# Patient Record
Sex: FEMALE | Race: WHITE | Employment: OTHER | URBAN - METROPOLITAN AREA
[De-identification: names, ages, dates, MRNs, and addresses within clinical notes are randomized per-mention and may not be internally consistent; named-entity substitution may affect disease eponyms.]

---

## 2017-08-04 ENCOUNTER — HOSPITAL ENCOUNTER (OUTPATIENT)
Dept: PHYSICAL THERAPY | Age: 67
Discharge: HOME | End: 2017-08-04
Payer: MEDICARE

## 2017-08-04 PROCEDURE — G8984 CARRY CURRENT STATUS: HCPCS

## 2017-08-04 PROCEDURE — 97110 THERAPEUTIC EXERCISES: CPT

## 2017-08-04 PROCEDURE — 97162 PT EVAL MOD COMPLEX 30 MIN: CPT

## 2017-08-04 PROCEDURE — G8985 CARRY GOAL STATUS: HCPCS

## 2017-08-04 NOTE — PROGRESS NOTES
Ambulatory/Rehab Services H2 Model Falls Risk Assessment    Risk Factor Pts. ·   Confusion/Disorientation/Impulsivity  []    4 ·   Symptomatic Depression  []   2 ·   Altered Elimination  []   1 ·   Dizziness/Vertigo  []   1 ·   Gender (Male)  []   1 ·   Any administered antiepileptics (anticonvulsants):  []   2 ·   Any administered benzodiazepines:  []   1 ·   Visual Impairment (specify):  []   1 ·   Portable Oxygen Use  []   1 ·   Orthostatic ? BP  []   1 ·   History of Recent Falls (within 3 mos.)  []   5     Ability to Rise from Chair (choose one) Pts. ·   Ability to rise in a single movement  [x]   0 ·   Pushes up, successful in one attempt  []   1 ·   Multiple attempts, but successful  []   3 ·   Unable to rise without assistance  []   4   Total: (5 or greater = High Risk) 0     Falls Prevention Plan:   []                Physical Limitations to Exercise (specify):   []                Mobility Assistance Device (type):   []                Exercise/Equipment Adaptation (specify):    ©2010 [facility] [address] Patent #[id number].  Federal Law prohibits the replication, distribution or use without written permission from [facility] [employer]

## 2017-08-04 NOTE — PROGRESS NOTES
Severo Aris  : 1950 [address] [place] [address],[address]  [address] [place] [address]  [phone]   [phone]        OUTPATIENT PHYSICAL THERAPY:Initial Assessment 2017        ICD-10: Treatment Diagnosis: Impingement syndrome of left shoulder (M75.42); Pain in left shoulder (M25.512)  Precautions/Allergies:   Sulfa (sulfonamide antibiotics) and Ultram [tramadol]   Fall Risk Score: 0 (? 5 = High Risk)  MD Orders: evaluate and treat MEDICAL/REFERRING DIAGNOSIS:  Left shoulder pain [M25.512]   DATE OF ONSET: gradual onset over the past few months  REFERRING PHYSICIAN: Anuja Batista MD  RETURN PHYSICIAN APPOINTMENT: 17     INITIAL ASSESSMENT:  Ms. Imelda Fowler presents to therapy with left shoulder pain secondary to subacromial impingement syndrome. She has restricted mobility of the posterior capsule and tightness of the rotator cuff contributing to difficulty with reaching behind her back. She demonstrates weakness through the posterior rotator cuff due to atrophy and neuromuscular inhibition from pain causing difficulty with carrying tasks and reaching overhead. She has specific pain with movements into extension and functional internal rotation. Skilled therapy is required to return to prior level of function. PROBLEM LIST (Impacting functional limitations):  1. Decreased Strength  2. Decreased ADL/Functional Activities  3. Increased Pain  4. Decreased Activity Tolerance  5. Decreased Flexibility/Joint Mobility INTERVENTIONS PLANNED:  1. Cryotherapy  2. Electrical Stimulation  3. Family Education  4. Home Exercise Program (HEP)  5. Manual Therapy  6. Neuromuscular Re-education/Strengthening  7. Range of Motion (ROM)  8. Therapeutic Activites  9. Therapeutic Exercise/Strengthening  10. modalities   TREATMENT PLAN:  Effective Dates: 17 TO 17.   Frequency/Duration: 2 times a week for 6 weeks  GOALS: (Goals have been discussed and agreed upon with patient.)  Short-Term Functional Goals: Time Frame: 2 weeks  1. Patient will be independent with HEP. 2. Patient will report pain <3/10 with all daily activity. Discharge Goals: Time Frame: 6 weeks  1. Patient will have no pain with all daily activity. 2. Patient will improve shoulder ER to 5/5 to restore dynamic stability required for overhead reaching tasks. 3. Patient will improve functional IR to T7 to normalize posterior shoulder mobility for tasks such as washing her back or fastening a belt. Rehabilitation Potential For Stated Goals: Excellent  Regarding Verlean Hockey therapy, I certify that the treatment plan above will be carried out by a therapist or under their direction. Thank you for this referral,  Wesley Conti DPT       Referring Physician Signature: Erica Castañeda MD              Date                      HISTORY:   History of Present Injury/Illness (Reason for Referral):  Patient presents to therapy with primary complaint of left shoulder pain that she describes as a painful stiffness with occasional throbbing and dull pain. She notes no specific mechanism of injury but reports symptoms have gradually worsened over the past few months. She has increased pain with reaching behind her back or reaching into extension. She also has difficulty with falling asleep as she can't get comfortable when laying in bed. She has recently undergone cortisone injection with good effect but notes the shoulder continues to feel stiff. Past Medical History/Comorbidities:   Ms. Ye Kauffman  has a past medical history of Arthritis; Chronic pain; GERD (gastroesophageal reflux disease); Insomnia; and Liver disease. She also has no past medical history of Adverse effect of anesthesia; Aneurysm ([address] Arrhythmia; Asthma; Autoimmune disease ([address] CAD (coronary artery disease); Cancer ([address] Chronic kidney disease; Chronic obstructive pulmonary disease ([address] Coagulation disorder ([address] Diabetes ([address]  Difficult intubation; Heart failure ([facility] Hypertension; Malignant hyperthermia due to anesthesia; Nausea & vomiting; Other unknown and unspecified cause of morbidity or mortality; Pseudocholinesterase deficiency; Psychiatric disorder; PUD (peptic ulcer disease); Seizures ([facility] [facility] Stroke [facility]); [facility]embolus [facility] Thyroid disease; or Unspecified sleep apnea. Ms. Christian Lopez  has a past surgical history that includes heent; tonsillectomy (1961); lap cholecystectomy; carpal tunnel release (Left); carpal tunnel release (Right); orthopaedic (Left); orthopaedic (Left); hysterectomy; knee arthroscopy (Right); and bunionectomy (Right). Social History/Living Environment:     Patient lives at home with her family. Prior Level of Function/Work/Activity:  Patient is retired. Dominant Side:         RIGHT    Current Medications:    Current Outpatient Prescriptions:     amitriptyline (ELAVIL) 25 mg tablet, Take 25 mg by mouth nightly., Disp: , Rfl:     diclofenac EC (VOLTAREN) 75 mg EC tablet, Take 75 mg by mouth two (2) times a day., Disp: , Rfl:     omeprazole (PRILOSEC) 20 mg capsule, Take 20 mg by mouth Daily (before breakfast). Take / use AM day of surgery  per anesthesia protocols. Indications: GASTROESOPHAGEAL REFLUX, Disp: , Rfl:     HYDROcodone-acetaminophen (NORCO) 5-325 mg per tablet, Take 2 Tabs by mouth nightly. Indications: PAIN, Disp: , Rfl:     multivitamin (ONE A DAY) tablet, Take 1 Tab by mouth daily. , Disp: , Rfl:     VITAMIN E, DL,TOCOPHERYL ACET, (VITAMIN E ACETATE) 400 unit cap capsule, Take 400 Units by mouth every other day., Disp: , Rfl:     magnesium oxide (MAG-OX) 400 mg tablet, Take 400 mg by mouth daily. , Disp: , Rfl:     calcium 500 mg tab, Take 1 Tab by mouth every other day., Disp: , Rfl:     b complex vitamins (B COMPLEX 1) tablet, Take 1 Tab by mouth every other day., Disp: , Rfl:     ascorbic acid (VITAMIN C) 500 mg tablet, Take 500 mg by mouth daily. , Disp: , Rfl:    Date Last Reviewed: 8/4/2017   # of Personal Factors/Comorbidities that affect the Plan of Care: 1-2: MODERATE COMPLEXITY   EXAMINATION:   Observation/Orthostatic Postural Assessment:          Mild increase in thoracic kyphosis with abducted scapular position. No gross deformity or atrophy noted. Palpation:          Tenderness present at subacromial space and posterior glenohumeral joint. ROM:     Flexion: 140 ° active; 150 ° passive with pain on overpressure L; 170 ° R  ER: 70 ° L with pain at end range; 90 ° R  Hand behind head: full and symmetric  IR: 40 ° L with pain; 80 ° R  Functional IR: finger to L3 L; T7 R      Strength:     Scaption: 4/5 L with pain; 5/5 R  ER: 4/5 L with pain; 5/5 R  IR: 4+/5 L; 5/5 R  Elbow flexion: 5/5 B  Elbow extension: 5/5 B      Special Tests:          Neer (+); Montgomery (+); Drop arm (-); Lift off (-). Neurological Screen:        Grossly intact  Functional Mobility:         Independent  Balance: WNL   Body Structures Involved:  1. Nerves  2. Bones  3. Joints  4. Muscles  5. Ligaments Body Functions Affected:  1. Sensory/Pain  2. Neuromusculoskeletal  3. Movement Related Activities and Participation Affected:  1. General Tasks and Demands  2. Mobility  3. Self Care  4. Domestic Life  5. Interpersonal Interactions and Relationships   # of elements that affect the Plan of Care: 3: MODERATE COMPLEXITY   CLINICAL PRESENTATION:   Presentation: Evolving clinical presentation with changing clinical characteristics: MODERATE COMPLEXITY   CLINICAL DECISION MAKING:   Outcome Measure: Tool Used: SPADI  Score:  Initial: 20/130 Most Recent: X (Date: -- )   Interpretation of Score: 15% impaired with activities that involve use of the left arm  Outcome Measure Conversion Site    ?  Carrying, Moving, and Handling Objects:     - CURRENT STATUS: CI - 1%-19% impaired, limited or restricted    - GOAL STATUS: CH - 0% impaired, limited or restricted    - D/C STATUS:  ---------------To be determined---------------         Medical Necessity:   · Patient is expected to demonstrate progress in strength, range of motion, balance and coordination to decrease assistance required with activities that involve use of the left arm and to return to prior level of function. Reason for Services/Other Comments:  · Patient has demonstrated an improvement in functional level by independent performance of HEP. Use of outcome tool(s) and clinical judgement create a POC that gives a: Questionable prediction of patient's progress: MODERATE COMPLEXITY   TREATMENT:   (In addition to Assessment/Re-Assessment sessions the following treatments were rendered)  THERAPEUTIC EXERCISE: (see below for minutes):  Exercises per grid below to improve mobility, strength, balance and coordination. Required minimal verbal and manual cues to promote proper body alignment, promote proper body posture and promote proper body mechanics. Progressed resistance, range, repetitions and complexity of movement as indicated. MANUAL THERAPY: (see below for minutes): Joint mobilization and Soft tissue mobilization was utilized and necessary because of the patient's restricted joint motion, painful spasm, loss of articular motion and restricted motion of soft tissue. MODALITIES: (see below for minutes):      to decrease pain     Date: 08/04/17       Modalities:                                Therapeutic Exercise: 15 min       Towel IR stretch [id number]       Horizontal adduction stretch 5x5sec       Supine ER stretch [id number]       Seated W Red tband 30x       Band pull aparts Red tband 30x               Proprioceptive Activities:                                Manual Therapy: 5 min       Passive physiologic mobilizations Aggressive, focusing on IR of the GHJ               Therapeutic Activities:                                        HEP: see 08/04/17 flow sheet for specifics.   Treatment/Session Assessment:  Patient is independent with performance of above described home program.    · Pain/ Symptoms: Initial:   4/10 Post Session:  No increase following today's treatment session. ·   Compliance with Program/Exercises: Will assess as treatment progresses. · Recommendations/Intent for next treatment session: \"Next visit will focus on advancements to more challenging activities\".   Total Treatment Duration:  PT Patient Time In/Time Out  Time In: 0930  Time Out: [address]

## 2017-08-07 ENCOUNTER — HOSPITAL ENCOUNTER (OUTPATIENT)
Dept: PHYSICAL THERAPY | Age: 67
Discharge: HOME | End: 2017-08-07
Payer: MEDICARE

## 2017-08-07 PROCEDURE — 97140 MANUAL THERAPY 1/> REGIONS: CPT

## 2017-08-07 PROCEDURE — 97110 THERAPEUTIC EXERCISES: CPT

## 2017-08-07 NOTE — PROGRESS NOTES
Brandon Peralta  : 1950 [address] [address].  [phone]   [phone]        OUTPATIENT PHYSICAL THERAPY:Daily Note 2017        ICD-10: Treatment Diagnosis: Impingement syndrome of left shoulder (M75.42); Pain in left shoulder (M25.512)  Precautions/Allergies:   Sulfa (sulfonamide antibiotics) and Ultram [tramadol]   Fall Risk Score: 0 (? 5 = High Risk)  MD Orders: evaluate and treat MEDICAL/REFERRING DIAGNOSIS:  Left shoulder pain [M25.512]   DATE OF ONSET: gradual onset over the past few months  REFERRING PHYSICIAN: Letty Montero MD  RETURN PHYSICIAN APPOINTMENT: 17     INITIAL ASSESSMENT:  Ms. Michael Overton presents to therapy with left shoulder pain secondary to subacromial impingement syndrome. She has restricted mobility of the posterior capsule and tightness of the rotator cuff contributing to difficulty with reaching behind her back. She demonstrates weakness through the posterior rotator cuff due to atrophy and neuromuscular inhibition from pain causing difficulty with carrying tasks and reaching overhead. She has specific pain with movements into extension and functional internal rotation. Skilled therapy is required to return to prior level of function. PROBLEM LIST (Impacting functional limitations):  1. Decreased Strength  2. Decreased ADL/Functional Activities  3. Increased Pain  4. Decreased Activity Tolerance  5. Decreased Flexibility/Joint Mobility INTERVENTIONS PLANNED:  1. Cryotherapy  2. Electrical Stimulation  3. Family Education  4. Home Exercise Program (HEP)  5. Manual Therapy  6. Neuromuscular Re-education/Strengthening  7. Range of Motion (ROM)  8. Therapeutic Activites  9. Therapeutic Exercise/Strengthening  10. modalities   TREATMENT PLAN:  Effective Dates: 17 TO 17.   Frequency/Duration: 2 times a week for 6 weeks  GOALS: (Goals have been discussed and agreed upon with patient.)  Short-Term Functional Goals: Time Frame: 2 weeks  1. Patient will be independent with HEP. 2. Patient will report pain <3/10 with all daily activity. Discharge Goals: Time Frame: 6 weeks  1. Patient will have no pain with all daily activity. 2. Patient will improve shoulder ER to 5/5 to restore dynamic stability required for overhead reaching tasks. 3. Patient will improve functional IR to T7 to normalize posterior shoulder mobility for tasks such as washing her back or fastening a belt. Rehabilitation Potential For Stated Goals: Excellent                HISTORY:   History of Present Injury/Illness (Reason for Referral):  Patient presents to therapy with primary complaint of left shoulder pain that she describes as a painful stiffness with occasional throbbing and dull pain. She notes no specific mechanism of injury but reports symptoms have gradually worsened over the past few months. She has increased pain with reaching behind her back or reaching into extension. She also has difficulty with falling asleep as she can't get comfortable when laying in bed. She has recently undergone cortisone injection with good effect but notes the shoulder continues to feel stiff. Past Medical History/Comorbidities:   Ms. Emilee Gomez  has a past medical history of Arthritis; Chronic pain; GERD (gastroesophageal reflux disease); Insomnia; and Liver disease. She also has no past medical history of Adverse effect of anesthesia; Aneurysm ([address] Arrhythmia; Asthma; Autoimmune disease ([address] CAD (coronary artery disease); Cancer ([address] Chronic kidney disease; Chronic obstructive pulmonary disease ([address] Coagulation disorder ([address] Diabetes ([address] Difficult intubation; Heart failure ([address] Hypertension; Malignant hyperthermia due to anesthesia; Nausea & vomiting; Other unknown and unspecified cause of morbidity or mortality; Pseudocholinesterase deficiency; Psychiatric disorder; PUD (peptic ulcer disease);  Seizures ([address] Stroke [facility]); Thromboembolus ([facility] Thyroid disease; or Unspecified sleep apnea. Ms. Christian Lopez  has a past surgical history that includes heent; tonsillectomy (1961); lap cholecystectomy; carpal tunnel release (Left); carpal tunnel release (Right); orthopaedic (Left); orthopaedic (Left); hysterectomy; knee arthroscopy (Right); and bunionectomy (Right). Social History/Living Environment:     Patient lives at home with her family. Prior Level of Function/Work/Activity:  Patient is retired. Dominant Side:         RIGHT    Current Medications:    Current Outpatient Prescriptions:     amitriptyline (ELAVIL) 25 mg tablet, Take 25 mg by mouth nightly., Disp: , Rfl:     diclofenac EC (VOLTAREN) 75 mg EC tablet, Take 75 mg by mouth two (2) times a day., Disp: , Rfl:     omeprazole (PRILOSEC) 20 mg capsule, Take 20 mg by mouth Daily (before breakfast). Take / use AM day of surgery  per anesthesia protocols. Indications: GASTROESOPHAGEAL REFLUX, Disp: , Rfl:     HYDROcodone-acetaminophen (NORCO) 5-325 mg per tablet, Take 2 Tabs by mouth nightly. Indications: PAIN, Disp: , Rfl:     multivitamin (ONE A DAY) tablet, Take 1 Tab by mouth daily. , Disp: , Rfl:     VITAMIN E, DL,TOCOPHERYL ACET, (VITAMIN E ACETATE) 400 unit cap capsule, Take 400 Units by mouth every other day., Disp: , Rfl:     magnesium oxide (MAG-OX) 400 mg tablet, Take 400 mg by mouth daily. , Disp: , Rfl:     calcium 500 mg tab, Take 1 Tab by mouth every other day., Disp: , Rfl:     b complex vitamins (B COMPLEX 1) tablet, Take 1 Tab by mouth every other day., Disp: , Rfl:     ascorbic acid (VITAMIN C) 500 mg tablet, Take 500 mg by mouth daily. , Disp: , Rfl:    Date Last Reviewed:  8/7/2017   EXAMINATION:   Observation/Orthostatic Postural Assessment:          Mild increase in thoracic kyphosis with abducted scapular position. No gross deformity or atrophy noted.   Palpation:          Tenderness present at subacromial space and posterior glenohumeral joint.  ROM:     Flexion: 140 ° active; 150 ° passive with pain on overpressure L; 170 ° R  ER: 70 ° L with pain at end range; 90 ° R  Hand behind head: full and symmetric  IR: 40 ° L with pain; 80 ° R  Functional IR: finger to L3 L; T7 R      Strength:     Scaption: 4/5 L with pain; 5/5 R  ER: 4/5 L with pain; 5/5 R  IR: 4+/5 L; 5/5 R  Elbow flexion: 5/5 B  Elbow extension: 5/5 B      Special Tests:          Neer (+); Montgomery (+); Drop arm (-); Lift off (-). Neurological Screen:        Grossly intact  Functional Mobility:         Independent  Balance: WNL   Body Structures Involved:  1. Nerves  2. Bones  3. Joints  4. Muscles  5. Ligaments Body Functions Affected:  1. Sensory/Pain  2. Neuromusculoskeletal  3. Movement Related Activities and Participation Affected:  1. General Tasks and Demands  2. Mobility  3. Self Care  4. Domestic Life  5. Interpersonal Interactions and Relationships   CLINICAL PRESENTATION:   CLINICAL DECISION MAKING:   Outcome Measure: Tool Used: SPADI  Score:  Initial: 20/130 Most Recent: X (Date: -- )   Interpretation of Score: 15% impaired with activities that involve use of the left arm  Outcome Measure Conversion Site    ? Carrying, Moving, and Handling Objects:     - CURRENT STATUS: CI - 1%-19% impaired, limited or restricted    - GOAL STATUS: CH - 0% impaired, limited or restricted    - D/C STATUS:  ---------------To be determined---------------         Medical Necessity:   · Patient is expected to demonstrate progress in strength, range of motion, balance and coordination to decrease assistance required with activities that involve use of the left arm and to return to prior level of function. Reason for Services/Other Comments:  · Patient has demonstrated an improvement in functional level by independent performance of HEP.    TREATMENT:   (In addition to Assessment/Re-Assessment sessions the following treatments were rendered)  THERAPEUTIC EXERCISE: (see below for minutes):  Exercises per grid below to improve mobility, strength, balance and coordination. Required minimal verbal and manual cues to promote proper body alignment, promote proper body posture and promote proper body mechanics. Progressed resistance, range, repetitions and complexity of movement as indicated. MANUAL THERAPY: (see below for minutes): Joint mobilization and Soft tissue mobilization was utilized and necessary because of the patient's restricted joint motion, painful spasm, loss of articular motion and restricted motion of soft tissue. MODALITIES: (see below for minutes):      to decrease pain     Date: 08/04/17 08/07/17 (visit 2)      Modalities:  10 min        Ice x 10 min                       Therapeutic Exercise: 15 min 25 min      Towel IR stretch [id number] Cane extension [id number]      Horizontal adduction stretch 5x5sec       Supine ER stretch [id number]       Seated W Red tband 30x       Band pull aparts Red tband 30x       UBE  5 min L       D2  Against manual resistance 2x10      ER  2# 3x8      Abduction  Sidelying 2# 2x12      Row  Blue 3x10      IR   Red 2x15      Extension  Green B with UE ER 2x10                      Proprioceptive Activities:                                Manual Therapy: 5 min 15 min      Passive physiologic mobilizations Aggressive, focusing on IR of the GHJ Gr 3 to 3++ focusing on IR              Therapeutic Activities:                                        HEP: see 08/04/17 flow sheet for specifics. Treatment/Session Assessment:  Fatigues easily with progression of rotator cuff strengthening, however this is not irritating. Modified stretching for improved functional IR with cane extension stretch as this is not provocative. · Pain/ Symptoms: Initial:   4/10 \"It's been a bit sore the past few days. \" Post Session:  No increase following today's treatment session. ·   Compliance with Program/Exercises:  Will assess as treatment progresses. · Recommendations/Intent for next treatment session: \"Next visit will focus on advancements to more challenging activities\".   Total Treatment Duration:  PT Patient Time In/Time Out  Time In: 1015  Time Out: 1400 Mikhail Boateng DPT

## 2017-08-10 ENCOUNTER — HOSPITAL ENCOUNTER (OUTPATIENT)
Dept: PHYSICAL THERAPY | Age: 67
Discharge: HOME | End: 2017-08-10
Payer: MEDICARE

## 2017-08-10 PROCEDURE — 97110 THERAPEUTIC EXERCISES: CPT

## 2017-08-10 PROCEDURE — 97140 MANUAL THERAPY 1/> REGIONS: CPT

## 2017-08-10 NOTE — PROGRESS NOTES
Pierce Higginbotham  : 1950 [address] [place] [address],[address]  [address] [place] [address]  [phone]   [phone]        OUTPATIENT PHYSICAL THERAPY:Daily Note 8/10/2017        ICD-10: Treatment Diagnosis: Impingement syndrome of left shoulder (M75.42); Pain in left shoulder (M25.512)  Precautions/Allergies:   Sulfa (sulfonamide antibiotics) and Ultram [tramadol]   Fall Risk Score: 0 (? 5 = High Risk)  MD Orders: evaluate and treat MEDICAL/REFERRING DIAGNOSIS:  Left shoulder pain [M25.512]   DATE OF ONSET: gradual onset over the past few months  REFERRING PHYSICIAN: Zafar Gomez MD  RETURN PHYSICIAN APPOINTMENT: 17     INITIAL ASSESSMENT:  Ms. Jovana Gutierrez presents to therapy with left shoulder pain secondary to subacromial impingement syndrome. She has restricted mobility of the posterior capsule and tightness of the rotator cuff contributing to difficulty with reaching behind her back. She demonstrates weakness through the posterior rotator cuff due to atrophy and neuromuscular inhibition from pain causing difficulty with carrying tasks and reaching overhead. She has specific pain with movements into extension and functional internal rotation. Skilled therapy is required to return to prior level of function. PROBLEM LIST (Impacting functional limitations):  1. Decreased Strength  2. Decreased ADL/Functional Activities  3. Increased Pain  4. Decreased Activity Tolerance  5. Decreased Flexibility/Joint Mobility INTERVENTIONS PLANNED:  1. Cryotherapy  2. Electrical Stimulation  3. Family Education  4. Home Exercise Program (HEP)  5. Manual Therapy  6. Neuromuscular Re-education/Strengthening  7. Range of Motion (ROM)  8. Therapeutic Activites  9. Therapeutic Exercise/Strengthening  10. modalities   TREATMENT PLAN:  Effective Dates: 17 TO 17.   Frequency/Duration: 2 times a week for 6 weeks  GOALS: (Goals have been discussed and agreed upon with patient.)  Short-Term Functional Goals: Time Frame: 2 weeks  1. Patient will be independent with HEP. 2. Patient will report pain <3/10 with all daily activity. Discharge Goals: Time Frame: 6 weeks  1. Patient will have no pain with all daily activity. 2. Patient will improve shoulder ER to 5/5 to restore dynamic stability required for overhead reaching tasks. 3. Patient will improve functional IR to T7 to normalize posterior shoulder mobility for tasks such as washing her back or fastening a belt. Rehabilitation Potential For Stated Goals: Excellent                HISTORY:   History of Present Injury/Illness (Reason for Referral):  Patient presents to therapy with primary complaint of left shoulder pain that she describes as a painful stiffness with occasional throbbing and dull pain. She notes no specific mechanism of injury but reports symptoms have gradually worsened over the past few months. She has increased pain with reaching behind her back or reaching into extension. She also has difficulty with falling asleep as she can't get comfortable when laying in bed. She has recently undergone cortisone injection with good effect but notes the shoulder continues to feel stiff. Past Medical History/Comorbidities:   Ms. Amparo Herron  has a past medical history of Arthritis; Chronic pain; GERD (gastroesophageal reflux disease); Insomnia; and Liver disease. She also has no past medical history of Adverse effect of anesthesia; Aneurysm ([address] Arrhythmia; Asthma; Autoimmune disease ([address] CAD (coronary artery disease); Cancer ([address] Chronic kidney disease; Chronic obstructive pulmonary disease ([address] Coagulation disorder ([address] Diabetes ([address] Difficult intubation; Heart failure ([address] Hypertension; Malignant hyperthermia due to anesthesia; Nausea & vomiting; Other unknown and unspecified cause of morbidity or mortality; Pseudocholinesterase deficiency; Psychiatric disorder; PUD (peptic ulcer disease);  Seizures ([address] Stroke [facility]); Thromboembolus ([facility] [address] [facility] Thyroid disease; or Unspecified sleep apnea. Ms. Sheilla Nyhan  has a past surgical history that includes heent; tonsillectomy (1961); lap cholecystectomy; carpal tunnel release (Left); carpal tunnel release (Right); orthopaedic (Left); orthopaedic (Left); hysterectomy; knee arthroscopy (Right); and bunionectomy (Right). Social History/Living Environment:     Patient lives at home with her family. Prior Level of Function/Work/Activity:  Patient is retired. Dominant Side:         RIGHT    Current Medications:    Current Outpatient Prescriptions:     amitriptyline (ELAVIL) 25 mg tablet, Take 25 mg by mouth nightly., Disp: , Rfl:     diclofenac EC (VOLTAREN) 75 mg EC tablet, Take 75 mg by mouth two (2) times a day., Disp: , Rfl:     omeprazole (PRILOSEC) 20 mg capsule, Take 20 mg by mouth Daily (before breakfast). Take / use AM day of surgery  per anesthesia protocols. Indications: GASTROESOPHAGEAL REFLUX, Disp: , Rfl:     HYDROcodone-acetaminophen (NORCO) 5-325 mg per tablet, Take 2 Tabs by mouth nightly. Indications: PAIN, Disp: , Rfl:     multivitamin (ONE A DAY) tablet, Take 1 Tab by mouth daily. , Disp: , Rfl:     VITAMIN E, DL,TOCOPHERYL ACET, (VITAMIN E ACETATE) 400 unit cap capsule, Take 400 Units by mouth every other day., Disp: , Rfl:     magnesium oxide (MAG-OX) 400 mg tablet, Take 400 mg by mouth daily. , Disp: , Rfl:     calcium 500 mg tab, Take 1 Tab by mouth every other day., Disp: , Rfl:     b complex vitamins (B COMPLEX 1) tablet, Take 1 Tab by mouth every other day., Disp: , Rfl:     ascorbic acid (VITAMIN C) 500 mg tablet, Take 500 mg by mouth daily. , Disp: , Rfl:    Date Last Reviewed:  8/10/2017   EXAMINATION:   Observation/Orthostatic Postural Assessment:          Mild increase in thoracic kyphosis with abducted scapular position. No gross deformity or atrophy noted.   Palpation:          Tenderness present at subacromial space and posterior glenohumeral joint.  ROM:     Flexion: 140 ° active; 150 ° passive with pain on overpressure L; 170 ° R  ER: 70 ° L with pain at end range; 90 ° R  Hand behind head: full and symmetric  IR: 40 ° L with pain; 80 ° R  Functional IR: finger to L3 L; T7 R      Strength:     Scaption: 4/5 L with pain; 5/5 R  ER: 4/5 L with pain; 5/5 R  IR: 4+/5 L; 5/5 R  Elbow flexion: 5/5 B  Elbow extension: 5/5 B      Special Tests:          Neer (+); Montgomery (+); Drop arm (-); Lift off (-). Neurological Screen:        Grossly intact  Functional Mobility:         Independent  Balance: WNL   Body Structures Involved:  1. Nerves  2. Bones  3. Joints  4. Muscles  5. Ligaments Body Functions Affected:  1. Sensory/Pain  2. Neuromusculoskeletal  3. Movement Related Activities and Participation Affected:  1. General Tasks and Demands  2. Mobility  3. Self Care  4. Domestic Life  5. Interpersonal Interactions and Relationships   CLINICAL PRESENTATION:   CLINICAL DECISION MAKING:   Outcome Measure: Tool Used: SPADI  Score:  Initial: 20/130 Most Recent: X (Date: -- )   Interpretation of Score: 15% impaired with activities that involve use of the left arm  Outcome Measure Conversion Site    ? Carrying, Moving, and Handling Objects:     - CURRENT STATUS: CI - 1%-19% impaired, limited or restricted    - GOAL STATUS: CH - 0% impaired, limited or restricted    - D/C STATUS:  ---------------To be determined---------------         Medical Necessity:   · Patient is expected to demonstrate progress in strength, range of motion, balance and coordination to decrease assistance required with activities that involve use of the left arm and to return to prior level of function. Reason for Services/Other Comments:  · Patient has demonstrated an improvement in functional level by independent performance of HEP.    TREATMENT:   (In addition to Assessment/Re-Assessment sessions the following treatments were rendered)  THERAPEUTIC EXERCISE: (see below for minutes):  Exercises per grid below to improve mobility, strength, balance and coordination. Required minimal verbal and manual cues to promote proper body alignment, promote proper body posture and promote proper body mechanics. Progressed resistance, range, repetitions and complexity of movement as indicated. MANUAL THERAPY: (see below for minutes): Joint mobilization and Soft tissue mobilization was utilized and necessary because of the patient's restricted joint motion, painful spasm, loss of articular motion and restricted motion of soft tissue. MODALITIES: (see below for minutes):      to decrease pain     Date: 08/04/17 08/07/17 (visit 2) 08/10/17 (visit 3)     Modalities:  10 min 10 min       Ice x 10 min  Ice x 10 min                     Therapeutic Exercise: 15 min 25 min 25 min     Towel IR stretch [id number] Cane extension [id number]      Horizontal adduction stretch 5x5sec       Supine ER stretch [id number]       Seated W Red tband 30x       Band pull aparts Red tband 30x       UBE  5 min L  5 min L5     D2  Against manual resistance 2x10 Against manual resistance 30x     ER  2# 3x8      Abduction  Sidelying 2# 2x12 Sidelying 2# 2x15     Row  Blue 3x10 Black 2x15     IR   Red 2x15 Red 2x15     Extension  Green B with UE ER 2x10 Green B with UE ER 2x15; red 15x      Scaption   2# 2x10             Proprioceptive Activities:                                Manual Therapy: 5 min 15 min 15 min     Passive physiologic mobilizations Aggressive, focusing on IR of the GHJ Gr 3 to 3++ focusing on IR repeat             Therapeutic Activities:                                        HEP: see 08/04/17 flow sheet for specifics. Treatment/Session Assessment:  Progress with loading of active elevation. This fatigues quickly but is not provocative. · Pain/ Symptoms: Initial:   4/10 \"It's a bit sore. I'm going behind my back a little better, but the shoulder is a bit more sore today. \" Post Session:  No increase following today's treatment session. ·   Compliance with Program/Exercises: Will assess as treatment progresses. · Recommendations/Intent for next treatment session: \"Next visit will focus on advancements to more challenging activities\".   Total Treatment Duration:  PT Patient Time In/Time Out  Time In: 1400  Time Out: [address]ibha López DPT

## 2017-08-14 ENCOUNTER — HOSPITAL ENCOUNTER (OUTPATIENT)
Dept: PHYSICAL THERAPY | Age: 67
Discharge: HOME | End: 2017-08-14
Payer: MEDICARE

## 2017-08-14 PROCEDURE — 97110 THERAPEUTIC EXERCISES: CPT

## 2017-08-14 PROCEDURE — 97140 MANUAL THERAPY 1/> REGIONS: CPT

## 2017-08-14 NOTE — PROGRESS NOTES
Severo Aris  : 1950 [address] [place].  [phone]   [phone]        OUTPATIENT PHYSICAL THERAPY:Daily Note 2017        ICD-10: Treatment Diagnosis: Impingement syndrome of left shoulder (M75.42); Pain in left shoulder (M25.512)  Precautions/Allergies:   Sulfa (sulfonamide antibiotics) and Ultram [tramadol]   Fall Risk Score: 0 (? 5 = High Risk)  MD Orders: evaluate and treat MEDICAL/REFERRING DIAGNOSIS:  Left shoulder pain [M25.512]   DATE OF ONSET: gradual onset over the past few months  REFERRING PHYSICIAN: Anuja Batista MD  RETURN PHYSICIAN APPOINTMENT: 17     INITIAL ASSESSMENT:  Ms. Imelda Fowler presents to therapy with left shoulder pain secondary to subacromial impingement syndrome. She has restricted mobility of the posterior capsule and tightness of the rotator cuff contributing to difficulty with reaching behind her back. She demonstrates weakness through the posterior rotator cuff due to atrophy and neuromuscular inhibition from pain causing difficulty with carrying tasks and reaching overhead. She has specific pain with movements into extension and functional internal rotation. Skilled therapy is required to return to prior level of function. PROBLEM LIST (Impacting functional limitations):  1. Decreased Strength  2. Decreased ADL/Functional Activities  3. Increased Pain  4. Decreased Activity Tolerance  5. Decreased Flexibility/Joint Mobility INTERVENTIONS PLANNED:  1. Cryotherapy  2. Electrical Stimulation  3. Family Education  4. Home Exercise Program (HEP)  5. Manual Therapy  6. Neuromuscular Re-education/Strengthening  7. Range of Motion (ROM)  8. Therapeutic Activites  9. Therapeutic Exercise/Strengthening  10. modalities   TREATMENT PLAN:  Effective Dates: 17 TO 17.   Frequency/Duration: 2 times a week for 6 weeks  GOALS: (Goals have been discussed and agreed upon with patient.)  Short-Term Functional Goals: Time Frame: 2 weeks  1. Patient will be independent with HEP. 2. Patient will report pain <3/10 with all daily activity. Discharge Goals: Time Frame: 6 weeks  1. Patient will have no pain with all daily activity. 2. Patient will improve shoulder ER to 5/5 to restore dynamic stability required for overhead reaching tasks. 3. Patient will improve functional IR to T7 to normalize posterior shoulder mobility for tasks such as washing her back or fastening a belt. Rehabilitation Potential For Stated Goals: Excellent                HISTORY:   History of Present Injury/Illness (Reason for Referral):  Patient presents to therapy with primary complaint of left shoulder pain that she describes as a painful stiffness with occasional throbbing and dull pain. She notes no specific mechanism of injury but reports symptoms have gradually worsened over the past few months. She has increased pain with reaching behind her back or reaching into extension. She also has difficulty with falling asleep as she can't get comfortable when laying in bed. She has recently undergone cortisone injection with good effect but notes the shoulder continues to feel stiff. Past Medical History/Comorbidities:   Ms. Jabari Slade  has a past medical history of Arthritis; Chronic pain; GERD (gastroesophageal reflux disease); Insomnia; and Liver disease. She also has no past medical history of Adverse effect of anesthesia; Aneurysm ([address] Arrhythmia; Asthma; Autoimmune disease ([address] CAD (coronary artery disease); Cancer ([address] Chronic kidney disease; Chronic obstructive pulmonary disease ([address] Coagulation disorder ([address] Diabetes ([address] Difficult intubation; Heart failure ([address] Hypertension; Malignant hyperthermia due to anesthesia; Nausea & vomiting; Other unknown and unspecified cause of morbidity or mortality; Pseudocholinesterase deficiency; Psychiatric disorder; PUD (peptic ulcer disease);  Seizures ([address] Stroke [facility]); Thromboembolus ([facility] [address] Thyroid disease; or Unspecified sleep apnea. Ms. Emilee Gomez  has a past surgical history that includes heent; tonsillectomy (1961); lap cholecystectomy; carpal tunnel release (Left); carpal tunnel release (Right); orthopaedic (Left); orthopaedic (Left); hysterectomy; knee arthroscopy (Right); and bunionectomy (Right). Social History/Living Environment:     Patient lives at home with her family. Prior Level of Function/Work/Activity:  Patient is retired. Dominant Side:         RIGHT    Current Medications:    Current Outpatient Prescriptions:     amitriptyline (ELAVIL) 25 mg tablet, Take 25 mg by mouth nightly., Disp: , Rfl:     diclofenac EC (VOLTAREN) 75 mg EC tablet, Take 75 mg by mouth two (2) times a day., Disp: , Rfl:     omeprazole (PRILOSEC) 20 mg capsule, Take 20 mg by mouth Daily (before breakfast). Take / use AM day of surgery  per anesthesia protocols. Indications: GASTROESOPHAGEAL REFLUX, Disp: , Rfl:     HYDROcodone-acetaminophen (NORCO) 5-325 mg per tablet, Take 2 Tabs by mouth nightly. Indications: PAIN, Disp: , Rfl:     multivitamin (ONE A DAY) tablet, Take 1 Tab by mouth daily. , Disp: , Rfl:     VITAMIN E, DL,TOCOPHERYL ACET, (VITAMIN E ACETATE) 400 unit cap capsule, Take 400 Units by mouth every other day., Disp: , Rfl:     magnesium oxide (MAG-OX) 400 mg tablet, Take 400 mg by mouth daily. , Disp: , Rfl:     calcium 500 mg tab, Take 1 Tab by mouth every other day., Disp: , Rfl:     b complex vitamins (B COMPLEX 1) tablet, Take 1 Tab by mouth every other day., Disp: , Rfl:     ascorbic acid (VITAMIN C) 500 mg tablet, Take 500 mg by mouth daily. , Disp: , Rfl:    Date Last Reviewed:  8/14/2017   EXAMINATION:   Observation/Orthostatic Postural Assessment:          Mild increase in thoracic kyphosis with abducted scapular position. No gross deformity or atrophy noted.   Palpation:          Tenderness present at subacromial space and posterior glenohumeral joint.  ROM:     Flexion: 140 ° active; 150 ° passive with pain on overpressure L; 170 ° R  ER: 70 ° L with pain at end range; 90 ° R  Hand behind head: full and symmetric  IR: 40 ° L with pain; 80 ° R  Functional IR: finger to L3 L; T7 R      Strength:     Scaption: 4/5 L with pain; 5/5 R  ER: 4/5 L with pain; 5/5 R  IR: 4+/5 L; 5/5 R  Elbow flexion: 5/5 B  Elbow extension: 5/5 B      Special Tests:          Neer (+); Montgomery (+); Drop arm (-); Lift off (-). Neurological Screen:        Grossly intact  Functional Mobility:         Independent  Balance: WNL   Body Structures Involved:  1. Nerves  2. Bones  3. Joints  4. Muscles  5. Ligaments Body Functions Affected:  1. Sensory/Pain  2. Neuromusculoskeletal  3. Movement Related Activities and Participation Affected:  1. General Tasks and Demands  2. Mobility  3. Self Care  4. Domestic Life  5. Interpersonal Interactions and Relationships   CLINICAL PRESENTATION:   CLINICAL DECISION MAKING:   Outcome Measure: Tool Used: SPADI  Score:  Initial: 20/130 Most Recent: X (Date: -- )   Interpretation of Score: 15% impaired with activities that involve use of the left arm  Outcome Measure Conversion Site    ? Carrying, Moving, and Handling Objects:     - CURRENT STATUS: CI - 1%-19% impaired, limited or restricted    - GOAL STATUS: CH - 0% impaired, limited or restricted    - D/C STATUS:  ---------------To be determined---------------         Medical Necessity:   · Patient is expected to demonstrate progress in strength, range of motion, balance and coordination to decrease assistance required with activities that involve use of the left arm and to return to prior level of function. Reason for Services/Other Comments:  · Patient has demonstrated an improvement in functional level by independent performance of HEP.    TREATMENT:   (In addition to Assessment/Re-Assessment sessions the following treatments were rendered)  THERAPEUTIC EXERCISE: (see below for minutes):  Exercises per grid below to improve mobility, strength, balance and coordination. Required minimal verbal and manual cues to promote proper body alignment, promote proper body posture and promote proper body mechanics. Progressed resistance, range, repetitions and complexity of movement as indicated. MANUAL THERAPY: (see below for minutes): Joint mobilization and Soft tissue mobilization was utilized and necessary because of the patient's restricted joint motion, painful spasm, loss of articular motion and restricted motion of soft tissue. MODALITIES: (see below for minutes):      to decrease pain     Date: 08/04/17 08/07/17 (visit 2) 08/10/17 (visit 3) 08/14/17 (visit 4)    Modalities:  10 min 10 min 10 min      Ice x 10 min  Ice x 10 min Ice x 10 min                    Therapeutic Exercise: 15 min 25 min 25 min 25 min    Towel IR stretch [id number] Cane extension [id number]      Horizontal adduction stretch 5x5sec       Supine ER stretch [id number]       Seated W Red tband 30x       Band pull aparts Red tband 30x       UBE  5 min L  5 min L5     D2  Against manual resistance 2x10 Against manual resistance 30x Against manual resistance 30x    ER  2# 3x8  2# 2x12    Abduction  Sidelying 2# 2x12 Sidelying 2# 2x15 Sidelying 2# 2x12    Row  Blue 3x10 Black 2x15 15# single arm 2x15    IR   Red 2x15 Red 2x15     Extension  Green B with UE ER 2x10 Green B with UE ER 2x15; red 15x  Red 2x15    Scaption   2# 2x10 2# 2x12; supine punch 3# 2x15; wall wash x10, 2# x15 with [employer] lift off            Proprioceptive Activities:                                Manual Therapy: 5 min 15 min 15 min 15 min    Passive physiologic mobilizations Aggressive, focusing on IR of the GHJ Gr 3 to 3++ focusing on IR repeat repeat            Therapeutic Activities:                                        HEP: see 08/04/17 flow sheet for specifics.   Treatment/Session Assessment:  Able to continue loading rotator cuff without initial increase in symptom severity. · Pain/ Symptoms: Initial:   4/10 \"It's been hurting a bit more. It's hard to sleep at night. \" Pt does note minimal pain at rest or during the day, no difficulty with raising the arm overhead. Primary symptoms provoked with reaching behind back. Post Session:  No increase following today's treatment session. ·   Compliance with Program/Exercises: Will assess as treatment progresses. · Recommendations/Intent for next treatment session: \"Next visit will focus on advancements to more challenging activities\".   Total Treatment Duration:  PT Patient Time In/Time Out  Time In: 1145  Time Out: Martir Tillman DPT

## 2017-08-16 ENCOUNTER — HOSPITAL ENCOUNTER (OUTPATIENT)
Dept: PHYSICAL THERAPY | Age: 67
Discharge: HOME | End: 2017-08-16
Payer: MEDICARE

## 2017-08-16 PROCEDURE — 97110 THERAPEUTIC EXERCISES: CPT

## 2017-08-16 PROCEDURE — 97140 MANUAL THERAPY 1/> REGIONS: CPT

## 2017-08-16 NOTE — PROGRESS NOTES
Puma Paget  : 1950 [address] [place] [address],[address]  [address] [place] [address].  [phone]   [phone]        OUTPATIENT PHYSICAL THERAPY:Daily Note 2017        ICD-10: Treatment Diagnosis: Impingement syndrome of left shoulder (M75.42); Pain in left shoulder (M25.512)  Precautions/Allergies:   Sulfa (sulfonamide antibiotics) and Ultram [tramadol]   Fall Risk Score: 0 (? 5 = High Risk)  MD Orders: evaluate and treat MEDICAL/REFERRING DIAGNOSIS:  Left shoulder pain [M25.512]   DATE OF ONSET: gradual onset over the past few months  REFERRING PHYSICIAN: Regino Lewis MD  RETURN PHYSICIAN APPOINTMENT: 17     INITIAL ASSESSMENT:  Ms. Sheilla Nyhan presents to therapy with left shoulder pain secondary to subacromial impingement syndrome. She has restricted mobility of the posterior capsule and tightness of the rotator cuff contributing to difficulty with reaching behind her back. She demonstrates weakness through the posterior rotator cuff due to atrophy and neuromuscular inhibition from pain causing difficulty with carrying tasks and reaching overhead. She has specific pain with movements into extension and functional internal rotation. Skilled therapy is required to return to prior level of function. PROBLEM LIST (Impacting functional limitations):  1. Decreased Strength  2. Decreased ADL/Functional Activities  3. Increased Pain  4. Decreased Activity Tolerance  5. Decreased Flexibility/Joint Mobility INTERVENTIONS PLANNED:  1. Cryotherapy  2. Electrical Stimulation  3. Family Education  4. Home Exercise Program (HEP)  5. Manual Therapy  6. Neuromuscular Re-education/Strengthening  7. Range of Motion (ROM)  8. Therapeutic Activites  9. Therapeutic Exercise/Strengthening  10. modalities   TREATMENT PLAN:  Effective Dates: 17 TO 17.   Frequency/Duration: 2 times a week for 6 weeks  GOALS: (Goals have been discussed and agreed upon with patient.)  Short-Term Functional Goals: Time Frame: 2 weeks  1. Patient will be independent with HEP. 2. Patient will report pain <3/10 with all daily activity. Discharge Goals: Time Frame: 6 weeks  1. Patient will have no pain with all daily activity. 2. Patient will improve shoulder ER to 5/5 to restore dynamic stability required for overhead reaching tasks. 3. Patient will improve functional IR to T7 to normalize posterior shoulder mobility for tasks such as washing her back or fastening a belt. Rehabilitation Potential For Stated Goals: Excellent                HISTORY:   History of Present Injury/Illness (Reason for Referral):  Patient presents to therapy with primary complaint of left shoulder pain that she describes as a painful stiffness with occasional throbbing and dull pain. She notes no specific mechanism of injury but reports symptoms have gradually worsened over the past few months. She has increased pain with reaching behind her back or reaching into extension. She also has difficulty with falling asleep as she can't get comfortable when laying in bed. She has recently undergone cortisone injection with good effect but notes the shoulder continues to feel stiff. Past Medical History/Comorbidities:   Ms. Carol Ann Harris  has a past medical history of Arthritis; Chronic pain; GERD (gastroesophageal reflux disease); Insomnia; and Liver disease. She also has no past medical history of Adverse effect of anesthesia; Aneurysm ([address] Arrhythmia; Asthma; Autoimmune disease ([address] CAD (coronary artery disease); Cancer ([address] Chronic kidney disease; Chronic obstructive pulmonary disease ([address] Coagulation disorder ([address] Diabetes ([address] Difficult intubation; Heart failure ([address] Hypertension; Malignant hyperthermia due to anesthesia; Nausea & vomiting; Other unknown and unspecified cause of morbidity or mortality; Pseudocholinesterase deficiency; Psychiatric disorder; PUD (peptic ulcer disease);  Seizures ([address] Stroke [facility]); Thromboembolus ([facility] [address] Thyroid disease; or Unspecified sleep apnea. Ms. Dulce Maria Damon  has a past surgical history that includes heent; tonsillectomy (1961); lap cholecystectomy; carpal tunnel release (Left); carpal tunnel release (Right); orthopaedic (Left); orthopaedic (Left); hysterectomy; knee arthroscopy (Right); and bunionectomy (Right). Social History/Living Environment:     Patient lives at home with her family. Prior Level of Function/Work/Activity:  Patient is retired. Dominant Side:         RIGHT    Current Medications:    Current Outpatient Prescriptions:     amitriptyline (ELAVIL) 25 mg tablet, Take 25 mg by mouth nightly., Disp: , Rfl:     diclofenac EC (VOLTAREN) 75 mg EC tablet, Take 75 mg by mouth two (2) times a day., Disp: , Rfl:     omeprazole (PRILOSEC) 20 mg capsule, Take 20 mg by mouth Daily (before breakfast). Take / use AM day of surgery  per anesthesia protocols. Indications: GASTROESOPHAGEAL REFLUX, Disp: , Rfl:     HYDROcodone-acetaminophen (NORCO) 5-325 mg per tablet, Take 2 Tabs by mouth nightly. Indications: PAIN, Disp: , Rfl:     multivitamin (ONE A DAY) tablet, Take 1 Tab by mouth daily. , Disp: , Rfl:     VITAMIN E, DL,TOCOPHERYL ACET, (VITAMIN E ACETATE) 400 unit cap capsule, Take 400 Units by mouth every other day., Disp: , Rfl:     magnesium oxide (MAG-OX) 400 mg tablet, Take 400 mg by mouth daily. , Disp: , Rfl:     calcium 500 mg tab, Take 1 Tab by mouth every other day., Disp: , Rfl:     b complex vitamins (B COMPLEX 1) tablet, Take 1 Tab by mouth every other day., Disp: , Rfl:     ascorbic acid (VITAMIN C) 500 mg tablet, Take 500 mg by mouth daily. , Disp: , Rfl:    Date Last Reviewed:  8/16/2017   EXAMINATION:   Observation/Orthostatic Postural Assessment:          Mild increase in thoracic kyphosis with abducted scapular position. No gross deformity or atrophy noted.   Palpation:          Tenderness present at subacromial space and posterior glenohumeral joint.  ROM:     Flexion: 140 ° active; 150 ° passive with pain on overpressure L; 170 ° R  ER: 70 ° L with pain at end range; 90 ° R  Hand behind head: full and symmetric  IR: 40 ° L with pain; 80 ° R  Functional IR: finger to L3 L; T7 R      Strength:     Scaption: 4/5 L with pain; 5/5 R  ER: 4/5 L with pain; 5/5 R  IR: 4+/5 L; 5/5 R  Elbow flexion: 5/5 B  Elbow extension: 5/5 B      Special Tests:          Neer (+); Montgomery (+); Drop arm (-); Lift off (-). Neurological Screen:        Grossly intact  Functional Mobility:         Independent  Balance: WNL   Body Structures Involved:  1. Nerves  2. Bones  3. Joints  4. Muscles  5. Ligaments Body Functions Affected:  1. Sensory/Pain  2. Neuromusculoskeletal  3. Movement Related Activities and Participation Affected:  1. General Tasks and Demands  2. Mobility  3. Self Care  4. Domestic Life  5. Interpersonal Interactions and Relationships   CLINICAL PRESENTATION:   CLINICAL DECISION MAKING:   Outcome Measure: Tool Used: SPADI  Score:  Initial: 20/130 Most Recent: X (Date: -- )   Interpretation of Score: 15% impaired with activities that involve use of the left arm  Outcome Measure Conversion Site    ? Carrying, Moving, and Handling Objects:     - CURRENT STATUS: CI - 1%-19% impaired, limited or restricted    - GOAL STATUS: CH - 0% impaired, limited or restricted    - D/C STATUS:  ---------------To be determined---------------         Medical Necessity:   · Patient is expected to demonstrate progress in strength, range of motion, balance and coordination to decrease assistance required with activities that involve use of the left arm and to return to prior level of function. Reason for Services/Other Comments:  · Patient has demonstrated an improvement in functional level by independent performance of HEP.    TREATMENT:   (In addition to Assessment/Re-Assessment sessions the following treatments were rendered)  THERAPEUTIC EXERCISE: (see below for minutes):  Exercises per grid below to improve mobility, strength, balance and coordination. Required minimal verbal and manual cues to promote proper body alignment, promote proper body posture and promote proper body mechanics. Progressed resistance, range, repetitions and complexity of movement as indicated. MANUAL THERAPY: (see below for minutes): Joint mobilization and Soft tissue mobilization was utilized and necessary because of the patient's restricted joint motion, painful spasm, loss of articular motion and restricted motion of soft tissue.    MODALITIES: (see below for minutes):      to decrease pain     Date: 08/04/17 08/07/17 (visit 2) 08/10/17 (visit 3) 08/14/17 (visit 4) 8-16-17  (Visit 5)    Modalities:  10 min 10 min 10 min 10 mins      Ice x 10 min  Ice x 10 min Ice x 10 min Repeat                    Therapeutic Exercise: 15 min 25 min 25 min 25 min 25 mins    Towel IR stretch [id number] Cane extension [id number]      Horizontal adduction stretch 5x5sec       Supine ER stretch [id number]       Seated W Red tband 30x       Band pull aparts Red tband 30x       UBE  5 min L  5 min L5  5 min L5    D2  Against manual resistance 2x10 Against manual resistance 30x Against manual resistance 30x Repeat    ER  2# 3x8  2# 2x12 Repeat SL    Abduction  Sidelying 2# 2x12 Sidelying 2# 2x15 Sidelying 2# 2x12 Repeat    Row  Blue 3x10 Black 2x15 15# single arm 2x15 17.5# repeat    IR   Red 2x15 Red 2x15     Extension  Green B with UE ER 2x10 Green B with UE ER 2x15; red 15x  Red 2x15 Repeat    Scaption   2# 2x10 2# 2x12; supine punch 3# 2x15; wall wash x10, 2# x15 with OKC lift off Repeat ll a   Pulleys      Flexion x30   Proprioceptive Activities:                                Manual Therapy: 5 min 15 min 15 min 15 min 15 mins    Passive physiologic mobilizations Aggressive, focusing on IR of the GHJ Gr 3 to 3++ focusing on IR repeat repeat Repeat            Therapeutic Activities: HEP: see 08/04/17 flow sheet for specifics. Treatment/Session Assessment: Pt had posterior pain with the SL ER using the weight and without the weight. Pt stated the pain subsided before the end of treatment. Pt tolerates full PROM following stretching. Continue with POC. · Pain/ Symptoms: Initial:   4/10 \"i had trouble getting to sleep last night. \"  Post Session:  No increase following today's treatment session. ·   Compliance with Program/Exercises: Will assess as treatment progresses. · Recommendations/Intent for next treatment session: \"Next visit will focus on advancements to more challenging activities\".   Total Treatment Duration:  PT Patient Time In/Time Out  Time In: 0930  Time Out: [address], PT, DPT

## 2017-08-21 ENCOUNTER — HOSPITAL ENCOUNTER (OUTPATIENT)
Dept: PHYSICAL THERAPY | Age: 67
Discharge: HOME | End: 2017-08-21
Payer: MEDICARE

## 2017-08-21 PROCEDURE — 97110 THERAPEUTIC EXERCISES: CPT

## 2017-08-21 PROCEDURE — 97140 MANUAL THERAPY 1/> REGIONS: CPT

## 2017-08-21 NOTE — PROGRESS NOTES
Josiane Harding  : 1950 [id number] [facility],[address]  [address] [place] [address].  [phone]   [phone]        OUTPATIENT PHYSICAL THERAPY:Daily Note 2017        ICD-10: Treatment Diagnosis: Impingement syndrome of left shoulder (M75.42); Pain in left shoulder (M25.512)  Precautions/Allergies:   Sulfa (sulfonamide antibiotics) and Ultram [tramadol]   Fall Risk Score: 0 (? 5 = High Risk)  MD Orders: evaluate and treat MEDICAL/REFERRING DIAGNOSIS:  Left shoulder pain [M25.512]   DATE OF ONSET: gradual onset over the past few months  REFERRING PHYSICIAN: Maxwell Levy MD  RETURN PHYSICIAN APPOINTMENT: 17     INITIAL ASSESSMENT:  Ms. Mikayla Bhatia presents to therapy with left shoulder pain secondary to subacromial impingement syndrome. She has restricted mobility of the posterior capsule and tightness of the rotator cuff contributing to difficulty with reaching behind her back. She demonstrates weakness through the posterior rotator cuff due to atrophy and neuromuscular inhibition from pain causing difficulty with carrying tasks and reaching overhead. She has specific pain with movements into extension and functional internal rotation. Skilled therapy is required to return to prior level of function. PROBLEM LIST (Impacting functional limitations):  1. Decreased Strength  2. Decreased ADL/Functional Activities  3. Increased Pain  4. Decreased Activity Tolerance  5. Decreased Flexibility/Joint Mobility INTERVENTIONS PLANNED:  1. Cryotherapy  2. Electrical Stimulation  3. Family Education  4. Home Exercise Program (HEP)  5. Manual Therapy  6. Neuromuscular Re-education/Strengthening  7. Range of Motion (ROM)  8. Therapeutic Activites  9. Therapeutic Exercise/Strengthening  10. modalities   TREATMENT PLAN:  Effective Dates: 17 TO 17.   Frequency/Duration: 2 times a week for 6 weeks  GOALS: (Goals have been discussed and agreed upon with patient.)  Short-Term Functional Goals: Time Frame: 2 weeks  1. Patient will be independent with HEP. 2. Patient will report pain <3/10 with all daily activity. Discharge Goals: Time Frame: 6 weeks  1. Patient will have no pain with all daily activity. 2. Patient will improve shoulder ER to 5/5 to restore dynamic stability required for overhead reaching tasks. 3. Patient will improve functional IR to T7 to normalize posterior shoulder mobility for tasks such as washing her back or fastening a belt. Rehabilitation Potential For Stated Goals: Excellent                HISTORY:   History of Present Injury/Illness (Reason for Referral):  Patient presents to therapy with primary complaint of left shoulder pain that she describes as a painful stiffness with occasional throbbing and dull pain. She notes no specific mechanism of injury but reports symptoms have gradually worsened over the past few months. She has increased pain with reaching behind her back or reaching into extension. She also has difficulty with falling asleep as she can't get comfortable when laying in bed. She has recently undergone cortisone injection with good effect but notes the shoulder continues to feel stiff. Past Medical History/Comorbidities:   Ms. Chelsie Quinn  has a past medical history of Arthritis; Chronic pain; GERD (gastroesophageal reflux disease); Insomnia; and Liver disease. She also has no past medical history of Adverse effect of anesthesia; Aneurysm ([address] Arrhythmia; Asthma; Autoimmune disease ([address] CAD (coronary artery disease); Cancer ([address] Chronic kidney disease; Chronic obstructive pulmonary disease ([address] Coagulation disorder ([address] Diabetes ([address] Difficult intubation; Heart failure ([address] Hypertension; Malignant hyperthermia due to anesthesia; Nausea & vomiting; Other unknown and unspecified cause of morbidity or mortality; Pseudocholinesterase deficiency; Psychiatric disorder; PUD (peptic ulcer disease);  Seizures ([address] Stroke [facility]); Thromboembolus ([facility] [address] Thyroid disease; or Unspecified sleep apnea. Ms. Sandeep Harrington  has a past surgical history that includes heent; tonsillectomy (1961); lap cholecystectomy; carpal tunnel release (Left); carpal tunnel release (Right); orthopaedic (Left); orthopaedic (Left); hysterectomy; knee arthroscopy (Right); and bunionectomy (Right). Social History/Living Environment:     Patient lives at home with her family. Prior Level of Function/Work/Activity:  Patient is retired. Dominant Side:         RIGHT    Current Medications:    Current Outpatient Prescriptions:     amitriptyline (ELAVIL) 25 mg tablet, Take 25 mg by mouth nightly., Disp: , Rfl:     diclofenac EC (VOLTAREN) 75 mg EC tablet, Take 75 mg by mouth two (2) times a day., Disp: , Rfl:     omeprazole (PRILOSEC) 20 mg capsule, Take 20 mg by mouth Daily (before breakfast). Take / use AM day of surgery  per anesthesia protocols. Indications: GASTROESOPHAGEAL REFLUX, Disp: , Rfl:     HYDROcodone-acetaminophen (NORCO) 5-325 mg per tablet, Take 2 Tabs by mouth nightly. Indications: PAIN, Disp: , Rfl:     multivitamin (ONE A DAY) tablet, Take 1 Tab by mouth daily. , Disp: , Rfl:     VITAMIN E, DL,TOCOPHERYL ACET, (VITAMIN E ACETATE) 400 unit cap capsule, Take 400 Units by mouth every other day., Disp: , Rfl:     magnesium oxide (MAG-OX) 400 mg tablet, Take 400 mg by mouth daily. , Disp: , Rfl:     calcium 500 mg tab, Take 1 Tab by mouth every other day., Disp: , Rfl:     b complex vitamins (B COMPLEX 1) tablet, Take 1 Tab by mouth every other day., Disp: , Rfl:     ascorbic acid (VITAMIN C) 500 mg tablet, Take 500 mg by mouth daily. , Disp: , Rfl:    Date Last Reviewed:  8/21/2017   EXAMINATION:   Observation/Orthostatic Postural Assessment:          Mild increase in thoracic kyphosis with abducted scapular position. No gross deformity or atrophy noted.   Palpation:          Tenderness present at subacromial space and posterior glenohumeral joint.  ROM:     Flexion: 140 ° active; 150 ° passive with pain on overpressure L; 170 ° R  ER: 70 ° L with pain at end range; 90 ° R  Hand behind head: full and symmetric  IR: 40 ° L with pain; 80 ° R  Functional IR: finger to L3 L; T7 R      Strength:     Scaption: 4/5 L with pain; 5/5 R  ER: 4/5 L with pain; 5/5 R  IR: 4+/5 L; 5/5 R  Elbow flexion: 5/5 B  Elbow extension: 5/5 B      Special Tests:          Neer (+); Montgomery (+); Drop arm (-); Lift off (-). Neurological Screen:        Grossly intact  Functional Mobility:         Independent  Balance: WNL   Body Structures Involved:  1. Nerves  2. Bones  3. Joints  4. Muscles  5. Ligaments Body Functions Affected:  1. Sensory/Pain  2. Neuromusculoskeletal  3. Movement Related Activities and Participation Affected:  1. General Tasks and Demands  2. Mobility  3. Self Care  4. Domestic Life  5. Interpersonal Interactions and Relationships   CLINICAL PRESENTATION:   CLINICAL DECISION MAKING:   Outcome Measure: Tool Used: SPADI  Score:  Initial: 20/130 Most Recent: X (Date: -- )   Interpretation of Score: 15% impaired with activities that involve use of the left arm  Outcome Measure Conversion Site    ? Carrying, Moving, and Handling Objects:     - CURRENT STATUS: CI - 1%-19% impaired, limited or restricted    - GOAL STATUS: CH - 0% impaired, limited or restricted    - D/C STATUS:  ---------------To be determined---------------         Medical Necessity:   · Patient is expected to demonstrate progress in strength, range of motion, balance and coordination to decrease assistance required with activities that involve use of the left arm and to return to prior level of function. Reason for Services/Other Comments:  · Patient has demonstrated an improvement in functional level by independent performance of HEP.    TREATMENT:   (In addition to Assessment/Re-Assessment sessions the following treatments were rendered)  THERAPEUTIC EXERCISE: (see below for minutes):  Exercises per grid below to improve mobility, strength, balance and coordination. Required minimal verbal and manual cues to promote proper body alignment, promote proper body posture and promote proper body mechanics. Progressed resistance, range, repetitions and complexity of movement as indicated. MANUAL THERAPY: (see below for minutes): Joint mobilization and Soft tissue mobilization was utilized and necessary because of the patient's restricted joint motion, painful spasm, loss of articular motion and restricted motion of soft tissue.    MODALITIES: (see below for minutes):      to decrease pain     Date: 08/04/17 08/07/17 (visit 2) 08/10/17 (visit 3) 08/14/17 (visit 4) 8-16-17  (Visit 5)  08/21/17 (visit 6)    Modalities:  10 min 10 min 10 min 10 mins  10 min      Ice x 10 min  Ice x 10 min Ice x 10 min Repeat  10 min                        Therapeutic Exercise: 15 min 25 min 25 min 25 min 25 mins  25 min    Towel IR stretch [id number] Cane extension [id number]        Horizontal adduction stretch 5x5sec         Supine ER stretch [id number]         Seated W Red tband 30x         Band pull aparts Red tband 30x     Red tband 2x15; D2 band pull aparts 2x15    UBE  5 min L  5 min L5  5 min L5  5 min L5    D2  Against manual resistance 2x10 Against manual resistance 30x Against manual resistance 30x Repeat  repeat    ER  2# 3x8  2# 2x12 Repeat SL      Abduction  Sidelying 2# 2x12 Sidelying 2# 2x15 Sidelying 2# 2x12 Repeat  2# 2x15    Row  Blue 3x10 Black 2x15 15# single arm 2x15 17.5# repeat  15# 2x15    IR   Red 2x15 Red 2x15   Yellow 2x15    Extension  Green B with UE ER 2x10 Green B with UE ER 2x15; red 15x  Red 2x15 Repeat  Red 2x15; standing extension stretch [id number]    Scaption   2# 2x10 2# 2x12; supine punch 3# 2x15; wall wash x10, 2# x15 with OKC lift off Repeat ll a repeat    Pulleys      Flexion x30     Proprioceptive Activities:                                        Manual Therapy: 5 min 15 min 15 min 15 min 15 mins  15 min    Passive physiologic mobilizations Aggressive, focusing on IR of the GHJ Gr 3 to 3++ focusing on IR repeat repeat Repeat  Gr 2 to 3 into flexion, ER and IR              Therapeutic Activities:                                                  HEP: see 08/04/17 flow sheet for specifics. Treatment/Session Assessment: Patient performs all exercises with good technique. Remains limited with progression of ER strength due to continued symptom irritability. · Pain/ Symptoms: Initial:   4/10 \"it's still been really hurting. It's mostly giving me trouble with falling asleep. It doesn't really hurt to reach up, but it's still painful to reach behind my back. \" Post Session:  No increase following today's treatment session. ·   Compliance with Program/Exercises: Will assess as treatment progresses. · Recommendations/Intent for next treatment session: \"Next visit will focus on advancements to more challenging activities\".   Total Treatment Duration:  PT Patient Time In/Time Out  Time In: 1015  Time Out: [address] [facility], [facility]

## 2017-08-23 ENCOUNTER — HOSPITAL ENCOUNTER (OUTPATIENT)
Dept: PHYSICAL THERAPY | Age: 67
Discharge: HOME | End: 2017-08-23
Payer: MEDICARE

## 2017-08-23 PROCEDURE — 97110 THERAPEUTIC EXERCISES: CPT

## 2017-08-23 PROCEDURE — 97140 MANUAL THERAPY 1/> REGIONS: CPT

## 2017-08-23 NOTE — PROGRESS NOTES
Angie Reese  : 1950 [address] [place] [address],[address]  [address] [place] [address].  [phone]   [phone]        OUTPATIENT PHYSICAL THERAPY:Daily Note 2017        ICD-10: Treatment Diagnosis: Impingement syndrome of left shoulder (M75.42); Pain in left shoulder (M25.512)  Precautions/Allergies:   Sulfa (sulfonamide antibiotics) and Ultram [tramadol]   Fall Risk Score: 0 (? 5 = High Risk)  MD Orders: evaluate and treat MEDICAL/REFERRING DIAGNOSIS:  Left shoulder pain [M25.512]   DATE OF ONSET: gradual onset over the past few months  REFERRING PHYSICIAN: Joy Ramirez MD  RETURN PHYSICIAN APPOINTMENT: 17     INITIAL ASSESSMENT:  Ms. Nia Bernard presents to therapy with left shoulder pain secondary to subacromial impingement syndrome. She has restricted mobility of the posterior capsule and tightness of the rotator cuff contributing to difficulty with reaching behind her back. She demonstrates weakness through the posterior rotator cuff due to atrophy and neuromuscular inhibition from pain causing difficulty with carrying tasks and reaching overhead. She has specific pain with movements into extension and functional internal rotation. Skilled therapy is required to return to prior level of function. PROBLEM LIST (Impacting functional limitations):  1. Decreased Strength  2. Decreased ADL/Functional Activities  3. Increased Pain  4. Decreased Activity Tolerance  5. Decreased Flexibility/Joint Mobility INTERVENTIONS PLANNED:  1. Cryotherapy  2. Electrical Stimulation  3. Family Education  4. Home Exercise Program (HEP)  5. Manual Therapy  6. Neuromuscular Re-education/Strengthening  7. Range of Motion (ROM)  8. Therapeutic Activites  9. Therapeutic Exercise/Strengthening  10. modalities   TREATMENT PLAN:  Effective Dates: 17 TO 17.   Frequency/Duration: 2 times a week for 6 weeks  GOALS: (Goals have been discussed and agreed upon with patient.)  Short-Term Functional Goals: Time Frame: 2 weeks  1. Patient will be independent with HEP. 2. Patient will report pain <3/10 with all daily activity. Discharge Goals: Time Frame: 6 weeks  1. Patient will have no pain with all daily activity. 2. Patient will improve shoulder ER to 5/5 to restore dynamic stability required for overhead reaching tasks. 3. Patient will improve functional IR to T7 to normalize posterior shoulder mobility for tasks such as washing her back or fastening a belt. Rehabilitation Potential For Stated Goals: Excellent                HISTORY:   History of Present Injury/Illness (Reason for Referral):  Patient presents to therapy with primary complaint of left shoulder pain that she describes as a painful stiffness with occasional throbbing and dull pain. She notes no specific mechanism of injury but reports symptoms have gradually worsened over the past few months. She has increased pain with reaching behind her back or reaching into extension. She also has difficulty with falling asleep as she can't get comfortable when laying in bed. She has recently undergone cortisone injection with good effect but notes the shoulder continues to feel stiff. Past Medical History/Comorbidities:   Ms. Christian Lopez  has a past medical history of Arthritis; Chronic pain; GERD (gastroesophageal reflux disease); Insomnia; and Liver disease. She also has no past medical history of Adverse effect of anesthesia; Aneurysm ([address] Arrhythmia; Asthma; Autoimmune disease ([address] CAD (coronary artery disease); Cancer ([address] Chronic kidney disease; Chronic obstructive pulmonary disease ([address] Coagulation disorder ([address] Diabetes ([address] Difficult intubation; Heart failure ([address] Hypertension; Malignant hyperthermia due to anesthesia; Nausea & vomiting; Other unknown and unspecified cause of morbidity or mortality; Pseudocholinesterase deficiency; Psychiatric disorder; PUD (peptic ulcer disease);  Seizures ([address] Stroke [facility]); Thromboembolus ([facility] [address] Thyroid disease; or Unspecified sleep apnea. Ms. Sandeep Harrington  has a past surgical history that includes heent; tonsillectomy (1961); lap cholecystectomy; carpal tunnel release (Left); carpal tunnel release (Right); orthopaedic (Left); orthopaedic (Left); hysterectomy; knee arthroscopy (Right); and bunionectomy (Right). Social History/Living Environment:     Patient lives at home with her family. Prior Level of Function/Work/Activity:  Patient is retired. Dominant Side:         RIGHT    Current Medications:    Current Outpatient Prescriptions:     amitriptyline (ELAVIL) 25 mg tablet, Take 25 mg by mouth nightly., Disp: , Rfl:     diclofenac EC (VOLTAREN) 75 mg EC tablet, Take 75 mg by mouth two (2) times a day., Disp: , Rfl:     omeprazole (PRILOSEC) 20 mg capsule, Take 20 mg by mouth Daily (before breakfast). Take / use AM day of surgery  per anesthesia protocols. Indications: GASTROESOPHAGEAL REFLUX, Disp: , Rfl:     HYDROcodone-acetaminophen (NORCO) 5-325 mg per tablet, Take 2 Tabs by mouth nightly. Indications: PAIN, Disp: , Rfl:     multivitamin (ONE A DAY) tablet, Take 1 Tab by mouth daily. , Disp: , Rfl:     VITAMIN E, DL,TOCOPHERYL ACET, (VITAMIN E ACETATE) 400 unit cap capsule, Take 400 Units by mouth every other day., Disp: , Rfl:     magnesium oxide (MAG-OX) 400 mg tablet, Take 400 mg by mouth daily. , Disp: , Rfl:     calcium 500 mg tab, Take 1 Tab by mouth every other day., Disp: , Rfl:     b complex vitamins (B COMPLEX 1) tablet, Take 1 Tab by mouth every other day., Disp: , Rfl:     ascorbic acid (VITAMIN C) 500 mg tablet, Take 500 mg by mouth daily. , Disp: , Rfl:    Date Last Reviewed:  8/23/2017   EXAMINATION:   Observation/Orthostatic Postural Assessment:          Mild increase in thoracic kyphosis with abducted scapular position. No gross deformity or atrophy noted.   Palpation:          Tenderness present at subacromial space and posterior glenohumeral joint.  ROM:     Flexion: 140 ° active; 150 ° passive with pain on overpressure L; 170 ° R  ER: 70 ° L with pain at end range; 90 ° R  Hand behind head: full and symmetric  IR: 40 ° L with pain; 80 ° R  Functional IR: finger to L3 L; T7 R      Strength:     Scaption: 4/5 L with pain; 5/5 R  ER: 4/5 L with pain; 5/5 R  IR: 4+/5 L; 5/5 R  Elbow flexion: 5/5 B  Elbow extension: 5/5 B      Special Tests:          Neer (+); Montgomery (+); Drop arm (-); Lift off (-). Neurological Screen:        Grossly intact  Functional Mobility:         Independent  Balance: WNL   Body Structures Involved:  1. Nerves  2. Bones  3. Joints  4. Muscles  5. Ligaments Body Functions Affected:  1. Sensory/Pain  2. Neuromusculoskeletal  3. Movement Related Activities and Participation Affected:  1. General Tasks and Demands  2. Mobility  3. Self Care  4. Domestic Life  5. Interpersonal Interactions and Relationships   CLINICAL PRESENTATION:   CLINICAL DECISION MAKING:   Outcome Measure: Tool Used: SPADI  Score:  Initial: 20/130 Most Recent: X (Date: -- )   Interpretation of Score: 15% impaired with activities that involve use of the left arm  Outcome Measure Conversion Site    ? Carrying, Moving, and Handling Objects:     - CURRENT STATUS: CI - 1%-19% impaired, limited or restricted    - GOAL STATUS: CH - 0% impaired, limited or restricted    - D/C STATUS:  ---------------To be determined---------------         Medical Necessity:   · Patient is expected to demonstrate progress in strength, range of motion, balance and coordination to decrease assistance required with activities that involve use of the left arm and to return to prior level of function. Reason for Services/Other Comments:  · Patient has demonstrated an improvement in functional level by independent performance of HEP.    TREATMENT:   (In addition to Assessment/Re-Assessment sessions the following treatments were rendered)  THERAPEUTIC EXERCISE: (see below for minutes):  Exercises per grid below to improve mobility, strength, balance and coordination. Required minimal verbal and manual cues to promote proper body alignment, promote proper body posture and promote proper body mechanics. Progressed resistance, range, repetitions and complexity of movement as indicated. MANUAL THERAPY: (see below for minutes): Joint mobilization and Soft tissue mobilization was utilized and necessary because of the patient's restricted joint motion, painful spasm, loss of articular motion and restricted motion of soft tissue.    MODALITIES: (see below for minutes):      to decrease pain     Date: 08/04/17 08/07/17 (visit 2) 08/10/17 (visit 3) 08/14/17 (visit 4) 8-16-17  (Visit 5)  08/21/17 (visit 6) 08/23/17 (visit 7)   Modalities:  10 min 10 min 10 min 10 mins  10 min 10 min     Ice x 10 min  Ice x 10 min Ice x 10 min Repeat  10 min 10 min                       Therapeutic Exercise: 15 min 25 min 25 min 25 min 25 mins  25 min 30 min   Towel IR stretch [id number] Cane extension [id number]        Horizontal adduction stretch 5x5sec         Supine ER stretch [id number]         Seated W Red tband 30x         Band pull aparts Red tband 30x     Red tband 2x15; D2 band pull aparts 2x15 Extension blue tband B 2x15   UBE  5 min L  5 min L5  5 min L5  5 min L5 5 min L5   D2  Against manual resistance 2x10 Against manual resistance 30x Against manual resistance 30x Repeat  repeat Against manual resistance 30x   ER  2# 3x8  2# 2x12 Repeat SL   Yellow 2x15   Abduction  Sidelying 2# 2x12 Sidelying 2# 2x15 Sidelying 2# 2x12 Repeat  2# 2x15 2# 2x15   Row  Blue 3x10 Black 2x15 15# single arm 2x15 17.5# repeat  15# 2x15 Green 2x15   IR   Red 2x15 Red 2x15   Yellow 2x15 Red 2x15   Extension  Green B with UE ER 2x10 Green B with UE ER 2x15; red 15x  Red 2x15 Repeat  Red 2x15; standing extension stretch [id number] See above   Scaption   2# 2x10 2# 2x12; supine punch 3# 2x15; wall wash x10, 2# x15 with OKC lift off Repeat ll a repeat Wall ball roll [id number]; punch yellow 2x15; scapular press up 3# 2x15   Pulleys      Flexion x30     Proprioceptive Activities:                                        Manual Therapy: 5 min 15 min 15 min 15 min 15 mins  15 min 15 min   Passive physiologic mobilizations Aggressive, focusing on IR of the GHJ Gr 3 to 3++ focusing on IR repeat repeat Repeat  Gr 2 to 3 into flexion, ER and IR repeat             Therapeutic Activities:                                                  HEP: see 08/04/17 flow sheet for specifics. Treatment/Session Assessment: Progressed with increased volume of strengthening into elevation without increased symptoms response. · Pain/ Symptoms: Initial:   3/10 \"It doesn't really hurt during the day, it just really aches at night. \" Post Session:  No increase following today's treatment session. ·   Compliance with Program/Exercises: Will assess as treatment progresses. · Recommendations/Intent for next treatment session: \"Next visit will focus on advancements to more challenging activities\".   Total Treatment Duration:  PT Patient Time In/Time Out  Time In: 0930  Time Out: 5360 Enoc Johnson DPT

## 2017-08-28 ENCOUNTER — HOSPITAL ENCOUNTER (OUTPATIENT)
Dept: PHYSICAL THERAPY | Age: 67
Discharge: HOME | End: 2017-08-28
Payer: MEDICARE

## 2017-08-28 PROCEDURE — 97110 THERAPEUTIC EXERCISES: CPT

## 2017-08-28 PROCEDURE — 97140 MANUAL THERAPY 1/> REGIONS: CPT

## 2017-08-28 NOTE — PROGRESS NOTES
Mike Ding  : 1950 [address] [place] [address],[address]  [address] [place] [address].  [phone]   [phone]        OUTPATIENT PHYSICAL THERAPY:Daily Note 2017        ICD-10: Treatment Diagnosis: Impingement syndrome of left shoulder (M75.42); Pain in left shoulder (M25.512)  Precautions/Allergies:   Sulfa (sulfonamide antibiotics) and Ultram [tramadol]   Fall Risk Score: 0 (? 5 = High Risk)  MD Orders: evaluate and treat MEDICAL/REFERRING DIAGNOSIS:  Left shoulder pain [M25.512]   DATE OF ONSET: gradual onset over the past few months  REFERRING PHYSICIAN: Jayro Laureano MD  RETURN PHYSICIAN APPOINTMENT: 17     INITIAL ASSESSMENT:  Ms. Ruiz Urbina presents to therapy with left shoulder pain secondary to subacromial impingement syndrome. She has restricted mobility of the posterior capsule and tightness of the rotator cuff contributing to difficulty with reaching behind her back. She demonstrates weakness through the posterior rotator cuff due to atrophy and neuromuscular inhibition from pain causing difficulty with carrying tasks and reaching overhead. She has specific pain with movements into extension and functional internal rotation. Skilled therapy is required to return to prior level of function. PROBLEM LIST (Impacting functional limitations):  1. Decreased Strength  2. Decreased ADL/Functional Activities  3. Increased Pain  4. Decreased Activity Tolerance  5. Decreased Flexibility/Joint Mobility INTERVENTIONS PLANNED:  1. Cryotherapy  2. Electrical Stimulation  3. Family Education  4. Home Exercise Program (HEP)  5. Manual Therapy  6. Neuromuscular Re-education/Strengthening  7. Range of Motion (ROM)  8. Therapeutic Activites  9. Therapeutic Exercise/Strengthening  10. modalities   TREATMENT PLAN:  Effective Dates: 17 TO 17.   Frequency/Duration: 2 times a week for 6 weeks  GOALS: (Goals have been discussed and agreed upon with patient.)  Short-Term Functional Goals: Time Frame: 2 weeks  1. Patient will be independent with HEP. 2. Patient will report pain <3/10 with all daily activity. Discharge Goals: Time Frame: 6 weeks  1. Patient will have no pain with all daily activity. 2. Patient will improve shoulder ER to 5/5 to restore dynamic stability required for overhead reaching tasks. 3. Patient will improve functional IR to T7 to normalize posterior shoulder mobility for tasks such as washing her back or fastening a belt. Rehabilitation Potential For Stated Goals: Excellent                HISTORY:   History of Present Injury/Illness (Reason for Referral):  Patient presents to therapy with primary complaint of left shoulder pain that she describes as a painful stiffness with occasional throbbing and dull pain. She notes no specific mechanism of injury but reports symptoms have gradually worsened over the past few months. She has increased pain with reaching behind her back or reaching into extension. She also has difficulty with falling asleep as she can't get comfortable when laying in bed. She has recently undergone cortisone injection with good effect but notes the shoulder continues to feel stiff. Past Medical History/Comorbidities:   Ms. Jeferson Quevedo  has a past medical history of Arthritis; Chronic pain; GERD (gastroesophageal reflux disease); Insomnia; and Liver disease. She also has no past medical history of Adverse effect of anesthesia; Aneurysm ([address] Arrhythmia; Asthma; Autoimmune disease ([address] CAD (coronary artery disease); Cancer ([address] Chronic kidney disease; Chronic obstructive pulmonary disease ([address] Coagulation disorder ([address] Diabetes ([address] Difficult intubation; Heart failure ([address] Hypertension; Malignant hyperthermia due to anesthesia; Nausea & vomiting; Other unknown and unspecified cause of morbidity or mortality; Pseudocholinesterase deficiency; Psychiatric disorder; PUD (peptic ulcer disease);  Seizures ([address] Stroke [facility]); Thromboembolus ([address] Thyroid disease; or Unspecified sleep apnea. Ms. Aryan Vieyra  has a past surgical history that includes heent; tonsillectomy (1961); lap cholecystectomy; carpal tunnel release (Left); carpal tunnel release (Right); orthopaedic (Left); orthopaedic (Left); hysterectomy; knee arthroscopy (Right); and bunionectomy (Right). Social History/Living Environment:     Patient lives at home with her family. Prior Level of Function/Work/Activity:  Patient is retired. Dominant Side:         RIGHT    Current Medications:    Current Outpatient Prescriptions:     amitriptyline (ELAVIL) 25 mg tablet, Take 25 mg by mouth nightly., Disp: , Rfl:     diclofenac EC (VOLTAREN) 75 mg EC tablet, Take 75 mg by mouth two (2) times a day., Disp: , Rfl:     omeprazole (PRILOSEC) 20 mg capsule, Take 20 mg by mouth Daily (before breakfast). Take / use AM day of surgery  per anesthesia protocols. Indications: GASTROESOPHAGEAL REFLUX, Disp: , Rfl:     HYDROcodone-acetaminophen (NORCO) 5-325 mg per tablet, Take 2 Tabs by mouth nightly. Indications: PAIN, Disp: , Rfl:     multivitamin (ONE A DAY) tablet, Take 1 Tab by mouth daily. , Disp: , Rfl:     VITAMIN E, DL,TOCOPHERYL ACET, (VITAMIN E ACETATE) 400 unit cap capsule, Take 400 Units by mouth every other day., Disp: , Rfl:     magnesium oxide (MAG-OX) 400 mg tablet, Take 400 mg by mouth daily. , Disp: , Rfl:     calcium 500 mg tab, Take 1 Tab by mouth every other day., Disp: , Rfl:     b complex vitamins (B COMPLEX 1) tablet, Take 1 Tab by mouth every other day., Disp: , Rfl:     ascorbic acid (VITAMIN C) 500 mg tablet, Take 500 mg by mouth daily. , Disp: , Rfl:    Date Last Reviewed:  8/28/2017   EXAMINATION:   Observation/Orthostatic Postural Assessment:          Mild increase in thoracic kyphosis with abducted scapular position. No gross deformity or atrophy noted.   Palpation:          Tenderness present at subacromial space and posterior glenohumeral joint.  ROM:     Flexion: 140 ° active; 150 ° passive with pain on overpressure L; 170 ° R  ER: 70 ° L with pain at end range; 90 ° R  Hand behind head: full and symmetric  IR: 40 ° L with pain; 80 ° R  Functional IR: finger to L3 L; T7 R      Strength:     Scaption: 4/5 L with pain; 5/5 R  ER: 4/5 L with pain; 5/5 R  IR: 4+/5 L; 5/5 R  Elbow flexion: 5/5 B  Elbow extension: 5/5 B      Special Tests:          Neer (+); Montgomery (+); Drop arm (-); Lift off (-). Neurological Screen:        Grossly intact  Functional Mobility:         Independent  Balance: WNL   Body Structures Involved:  1. Nerves  2. Bones  3. Joints  4. Muscles  5. Ligaments Body Functions Affected:  1. Sensory/Pain  2. Neuromusculoskeletal  3. Movement Related Activities and Participation Affected:  1. General Tasks and Demands  2. Mobility  3. Self Care  4. Domestic Life  5. Interpersonal Interactions and Relationships   CLINICAL PRESENTATION:   CLINICAL DECISION MAKING:   Outcome Measure: Tool Used: SPADI  Score:  Initial: 20/130 Most Recent: X (Date: -- )   Interpretation of Score: 15% impaired with activities that involve use of the left arm  Outcome Measure Conversion Site    ? Carrying, Moving, and Handling Objects:     - CURRENT STATUS: CI - 1%-19% impaired, limited or restricted    - GOAL STATUS: CH - 0% impaired, limited or restricted    - D/C STATUS:  ---------------To be determined---------------         Medical Necessity:   · Patient is expected to demonstrate progress in strength, range of motion, balance and coordination to decrease assistance required with activities that involve use of the left arm and to return to prior level of function. Reason for Services/Other Comments:  · Patient has demonstrated an improvement in functional level by independent performance of HEP.    TREATMENT:   (In addition to Assessment/Re-Assessment sessions the following treatments were rendered)  THERAPEUTIC EXERCISE: (see below for minutes):  Exercises per grid below to improve mobility, strength, balance and coordination. Required minimal verbal and manual cues to promote proper body alignment, promote proper body posture and promote proper body mechanics. Progressed resistance, range, repetitions and complexity of movement as indicated. MANUAL THERAPY: (see below for minutes): Joint mobilization and Soft tissue mobilization was utilized and necessary because of the patient's restricted joint motion, painful spasm, loss of articular motion and restricted motion of soft tissue.    MODALITIES: (see below for minutes):      to decrease pain     Date: 08/04/17 08/07/17 (visit 2) 08/10/17 (visit 3) 08/14/17 (visit 4) 8-16-17  (Visit 5)  08/21/17 (visit 6) 08/23/17 (visit 7) 08/28/17 (Visit 8)    Modalities:  10 min 10 min 10 min 10 mins  10 min 10 min       Ice x 10 min  Ice x 10 min Ice x 10 min Repeat  10 min 10 min                             Therapeutic Exercise: 15 min 25 min 25 min 25 min 25 mins  25 min 30 min 30 min    Towel IR stretch [id number] Cane extension [id number]          Horizontal adduction stretch 5x5sec           Supine ER stretch [id number]           Seated W Red tband 30x           Band pull aparts Red tband 30x     Red tband 2x15; D2 band pull aparts 2x15 Extension blue tband B 2x15     UBE  5 min L  5 min L5  5 min L5  5 min L5 5 min L5 5 min L5    D2  Against manual resistance 2x10 Against manual resistance 30x Against manual resistance 30x Repeat  repeat Against manual resistance 30x Against manual resistance 30x    ER  2# 3x8  2# 2x12 Repeat SL   Yellow 2x15 Repeat; sidelying horizontal abduction 1# 2x12    Abduction  Sidelying 2# 2x12 Sidelying 2# 2x15 Sidelying 2# 2x12 Repeat  2# 2x15 2# 2x15 2# 2x15 sidelying    Row  Blue 3x10 Black 2x15 15# single arm 2x15 17.5# repeat  15# 2x15 Green 2x15 Black B 2x15    IR   Red 2x15 Red 2x15   Yellow 2x15 Red 2x15 Red 2x15    Extension  Green B with UE ER 2x10 Green B with UE ER 2x15; red 15x  Red 2x15 Repeat  Red 2x15; standing extension stretch [id number] See above Red 2x15; standing extension B with UE ER 2x15    Scaption   2# 2x10 2# 2x12; supine punch 3# 2x15; wall wash x10, 2# x15 with OKC lift off Repeat ll a repeat Wall ball roll [id number]; punch yellow 2x15; scapular press up 3# 2x15 Repeat    Pulleys      Flexion x30   Wall wash 2# with OKC lower 2x10    Proprioceptive Activities:                                                Manual Therapy: 5 min 15 min 15 min 15 min 15 mins  15 min 15 min 15 min    Passive physiologic mobilizations Aggressive, focusing on IR of the GHJ Gr 3 to 3++ focusing on IR repeat repeat Repeat  Gr 2 to 3 into flexion, ER and IR repeat repeat                Therapeutic Activities:                                                            HEP: see 08/04/17 flow sheet for specifics. Treatment/Session Assessment: Plan to continue x 1 visit to further progress HEP prior to f/u with MD. Continues to demonstrate some weakness with elevation and at the posterior shoulder, however symptom irritability continues to significantly decrease. · Pain/ Symptoms: Initial:   2/10 \"I don't really notice it much during the day. It doesn't bother me unless I think about it. I can sleep on it now. \" Post Session:  No increase following today's treatment session. ·   Compliance with Program/Exercises: Will assess as treatment progresses. · Recommendations/Intent for next treatment session: \"Next visit will focus on advancements to more challenging activities\".   Total Treatment Duration:  PT Patient Time In/Time Out  Time In: 0930  Time Out: [address] [facility], [facility]

## 2017-08-30 ENCOUNTER — HOSPITAL ENCOUNTER (OUTPATIENT)
Dept: PHYSICAL THERAPY | Age: 67
Discharge: HOME | End: 2017-08-30
Payer: MEDICARE

## 2017-08-30 PROCEDURE — 97110 THERAPEUTIC EXERCISES: CPT

## 2017-08-30 PROCEDURE — 97140 MANUAL THERAPY 1/> REGIONS: CPT

## 2017-08-30 NOTE — PROGRESS NOTES
Africa Lubin  : 1950 [address] [place] [address],[address]  [address] [place] [address]  [phone]   [phone]        OUTPATIENT PHYSICAL THERAPY:Daily Note 2017        ICD-10: Treatment Diagnosis: Impingement syndrome of left shoulder (M75.42); Pain in left shoulder (M25.512)  Precautions/Allergies:   Sulfa (sulfonamide antibiotics) and Ultram [tramadol]   Fall Risk Score: 0 (? 5 = High Risk)  MD Orders: evaluate and treat MEDICAL/REFERRING DIAGNOSIS:  Left shoulder pain [M25.512]   DATE OF ONSET: gradual onset over the past few months  REFERRING PHYSICIAN: Azalia Arroyo MD  RETURN PHYSICIAN APPOINTMENT: 17     INITIAL ASSESSMENT:  Ms. Amaury Cedillo presents to therapy with left shoulder pain secondary to subacromial impingement syndrome. She has restricted mobility of the posterior capsule and tightness of the rotator cuff contributing to difficulty with reaching behind her back. She demonstrates weakness through the posterior rotator cuff due to atrophy and neuromuscular inhibition from pain causing difficulty with carrying tasks and reaching overhead. She has specific pain with movements into extension and functional internal rotation. Skilled therapy is required to return to prior level of function. PROBLEM LIST (Impacting functional limitations):  1. Decreased Strength  2. Decreased ADL/Functional Activities  3. Increased Pain  4. Decreased Activity Tolerance  5. Decreased Flexibility/Joint Mobility INTERVENTIONS PLANNED:  1. Cryotherapy  2. Electrical Stimulation  3. Family Education  4. Home Exercise Program (HEP)  5. Manual Therapy  6. Neuromuscular Re-education/Strengthening  7. Range of Motion (ROM)  8. Therapeutic Activites  9. Therapeutic Exercise/Strengthening  10. modalities   TREATMENT PLAN:  Effective Dates: 17 TO 17.   Frequency/Duration: 2 times a week for 6 weeks  GOALS: (Goals have been discussed and agreed upon with patient.)  Short-Term Functional Goals: Time Frame: 2 weeks  1. Patient will be independent with HEP. 2. Patient will report pain <3/10 with all daily activity. Discharge Goals: Time Frame: 6 weeks  1. Patient will have no pain with all daily activity. 2. Patient will improve shoulder ER to 5/5 to restore dynamic stability required for overhead reaching tasks. 3. Patient will improve functional IR to T7 to normalize posterior shoulder mobility for tasks such as washing her back or fastening a belt. Rehabilitation Potential For Stated Goals: Excellent                HISTORY:   History of Present Injury/Illness (Reason for Referral):  Patient presents to therapy with primary complaint of left shoulder pain that she describes as a painful stiffness with occasional throbbing and dull pain. She notes no specific mechanism of injury but reports symptoms have gradually worsened over the past few months. She has increased pain with reaching behind her back or reaching into extension. She also has difficulty with falling asleep as she can't get comfortable when laying in bed. She has recently undergone cortisone injection with good effect but notes the shoulder continues to feel stiff. Past Medical History/Comorbidities:   Ms. Toni Atkins  has a past medical history of Arthritis; Chronic pain; GERD (gastroesophageal reflux disease); Insomnia; and Liver disease. She also has no past medical history of Adverse effect of anesthesia; Aneurysm ([address] Arrhythmia; Asthma; Autoimmune disease ([address] CAD (coronary artery disease); Cancer ([address] Chronic kidney disease; Chronic obstructive pulmonary disease ([address] Coagulation disorder ([address] Diabetes ([address] Difficult intubation; Heart failure ([address] Hypertension; Malignant hyperthermia due to anesthesia; Nausea & vomiting; Other unknown and unspecified cause of morbidity or mortality; Pseudocholinesterase deficiency; Psychiatric disorder; PUD (peptic ulcer disease);  Seizures ([address] Stroke [facility]); Thromboembolus ([facility] Thyroid disease; or Unspecified sleep apnea. Ms. Ye Kauffman  has a past surgical history that includes heent; tonsillectomy (1961); lap cholecystectomy; carpal tunnel release (Left); carpal tunnel release (Right); orthopaedic (Left); orthopaedic (Left); hysterectomy; knee arthroscopy (Right); and bunionectomy (Right). Social History/Living Environment:     Patient lives at home with her family. Prior Level of Function/Work/Activity:  Patient is retired. Dominant Side:         RIGHT    Current Medications:    Current Outpatient Prescriptions:     amitriptyline (ELAVIL) 25 mg tablet, Take 25 mg by mouth nightly., Disp: , Rfl:     diclofenac EC (VOLTAREN) 75 mg EC tablet, Take 75 mg by mouth two (2) times a day., Disp: , Rfl:     omeprazole (PRILOSEC) 20 mg capsule, Take 20 mg by mouth Daily (before breakfast). Take / use AM day of surgery  per anesthesia protocols. Indications: GASTROESOPHAGEAL REFLUX, Disp: , Rfl:     HYDROcodone-acetaminophen (NORCO) 5-325 mg per tablet, Take 2 Tabs by mouth nightly. Indications: PAIN, Disp: , Rfl:     multivitamin (ONE A DAY) tablet, Take 1 Tab by mouth daily. , Disp: , Rfl:     VITAMIN E, DL,TOCOPHERYL ACET, (VITAMIN E ACETATE) 400 unit cap capsule, Take 400 Units by mouth every other day., Disp: , Rfl:     magnesium oxide (MAG-OX) 400 mg tablet, Take 400 mg by mouth daily. , Disp: , Rfl:     calcium 500 mg tab, Take 1 Tab by mouth every other day., Disp: , Rfl:     b complex vitamins (B COMPLEX 1) tablet, Take 1 Tab by mouth every other day., Disp: , Rfl:     ascorbic acid (VITAMIN C) 500 mg tablet, Take 500 mg by mouth daily. , Disp: , Rfl:    Date Last Reviewed:  8/30/2017   EXAMINATION:   Observation/Orthostatic Postural Assessment:          Mild increase in thoracic kyphosis with abducted scapular position. No gross deformity or atrophy noted.   Palpation:          Tenderness present at subacromial space and posterior glenohumeral joint.  ROM:     Flexion: 140 ° active; 150 ° passive with pain on overpressure L; 170 ° R  ER: 70 ° L with pain at end range; 90 ° R  Hand behind head: full and symmetric  IR: 40 ° L with pain; 80 ° R  Functional IR: finger to L3 L; T7 R      Strength:     Scaption: 4/5 L with pain; 5/5 R  ER: 4/5 L with pain; 5/5 R  IR: 4+/5 L; 5/5 R  Elbow flexion: 5/5 B  Elbow extension: 5/5 B      Special Tests:          Neer (+); Montgomery (+); Drop arm (-); Lift off (-). Neurological Screen:        Grossly intact  Functional Mobility:         Independent  Balance: WNL   Body Structures Involved:  1. Nerves  2. Bones  3. Joints  4. Muscles  5. Ligaments Body Functions Affected:  1. Sensory/Pain  2. Neuromusculoskeletal  3. Movement Related Activities and Participation Affected:  1. General Tasks and Demands  2. Mobility  3. Self Care  4. Domestic Life  5. Interpersonal Interactions and Relationships   CLINICAL PRESENTATION:   CLINICAL DECISION MAKING:   Outcome Measure: Tool Used: SPADI  Score:  Initial: 20/130 Most Recent: X (Date: -- )   Interpretation of Score: 15% impaired with activities that involve use of the left arm  Outcome Measure Conversion Site    ? Carrying, Moving, and Handling Objects:     - CURRENT STATUS: CI - 1%-19% impaired, limited or restricted    - GOAL STATUS: CH - 0% impaired, limited or restricted    - D/C STATUS:  ---------------To be determined---------------         Medical Necessity:   · Patient is expected to demonstrate progress in strength, range of motion, balance and coordination to decrease assistance required with activities that involve use of the left arm and to return to prior level of function. Reason for Services/Other Comments:  · Patient has demonstrated an improvement in functional level by independent performance of HEP.    TREATMENT:   (In addition to Assessment/Re-Assessment sessions the following treatments were rendered)  THERAPEUTIC EXERCISE: (see below for minutes):  Exercises per grid below to improve mobility, strength, balance and coordination. Required minimal verbal and manual cues to promote proper body alignment, promote proper body posture and promote proper body mechanics. Progressed resistance, range, repetitions and complexity of movement as indicated. MANUAL THERAPY: (see below for minutes): Joint mobilization and Soft tissue mobilization was utilized and necessary because of the patient's restricted joint motion, painful spasm, loss of articular motion and restricted motion of soft tissue.    MODALITIES: (see below for minutes):      to decrease pain     Date: 08/04/17 08/07/17 (visit 2) 08/10/17 (visit 3) 08/14/17 (visit 4) 8-16-17  (Visit 5)  08/21/17 (visit 6) 08/23/17 (visit 7) 08/28/17 (Visit 8) 8-30-17  (Visit 9)   Modalities:  10 min 10 min 10 min 10 mins  10 min 10 min       Ice x 10 min  Ice x 10 min Ice x 10 min Repeat  10 min 10 min                             Therapeutic Exercise: 15 min 25 min 25 min 25 min 25 mins  25 min 30 min 30 min 30 mins    Towel IR stretch [id number] Cane extension [id number]          Horizontal adduction stretch 5x5sec           Supine ER stretch [id number]           Seated W Red tband 30x           Band pull aparts Red tband 30x     Red tband 2x15; D2 band pull aparts 2x15 Extension blue tband B 2x15     UBE  5 min L  5 min L5  5 min L5  5 min L5 5 min L5 5 min L5 Repeat    D2  Against manual resistance 2x10 Against manual resistance 30x Against manual resistance 30x Repeat  repeat Against manual resistance 30x Against manual resistance 30x Repeat    ER  2# 3x8  2# 2x12 Repeat SL   Yellow 2x15 Repeat; sidelying horizontal abduction 1# 2x12 Repeat    Abduction  Sidelying 2# 2x12 Sidelying 2# 2x15 Sidelying 2# 2x12 Repeat  2# 2x15 2# 2x15 2# 2x15 sidelying Repeat    Row  Blue 3x10 Black 2x15 15# single arm 2x15 17.5# repeat  15# 2x15 Green 2x15 Black B 2x15 Repeat    IR   Red 2x15 Red 2x15   Yellow 2x15 Red 2x15 Red 2x15 Repeat    Extension  Green B with UE ER 2x10 Green B with UE ER 2x15; red 15x  Red 2x15 Repeat  Red 2x15; standing extension stretch [id number] See above Red 2x15; standing extension B with UE ER 2x15 Repeat    Scaption   2# 2x10 2# 2x12; supine punch 3# 2x15; wall wash x10, 2# x15 with OKC lift off Repeat ll a repeat Wall ball roll [id number]; punch yellow 2x15; scapular press up 3# 2x15 Repeat    Pulleys      Flexion x30   Wall wash 2# with OKC lower 2x10 Repeat    Proprioceptive Activities:                                                Manual Therapy: 5 min 15 min 15 min 15 min 15 mins  15 min 15 min 15 min 15 mins    Passive physiologic mobilizations Aggressive, focusing on IR of the GHJ Gr 3 to 3++ focusing on IR repeat repeat Repeat  Gr 2 to 3 into flexion, ER and IR repeat repeat Repeat                Therapeutic Activities:                                                            HEP: see 08/04/17 flow sheet for specifics. Treatment/Session Assessment: Pt is still limited in IR and all other movements are [facility]. Pt requested to continue 2 more weeks to focus on IR and stretching due to being limited with showering and other daily activities into the movement. Pt was suggested to continue with the IR stretch behind the back but use the pulleys instead of towel for less resistance with the stretch. Pt was also suggested to alter her sleeping position with a pillow behind the shoulder to help with the stress when laying supine to sleep. · Pain/ Symptoms: Initial:   0/10 \"I dont notice it until I get ready to go to sleep at night. \"  Post Session:  No increase following today's treatment session. ·   Compliance with Program/Exercises: Will assess as treatment progresses. · Recommendations/Intent for next treatment session: \"Next visit will focus on advancements to more challenging activities\".   Total Treatment Duration:  PT Patient Time In/Time Out  Time In: 0930  Time Out: Eugenio Carter, PT, DPT

## 2017-09-05 ENCOUNTER — HOSPITAL ENCOUNTER (OUTPATIENT)
Dept: PHYSICAL THERAPY | Age: 67
Discharge: HOME | End: 2017-09-05
Payer: MEDICARE

## 2017-09-05 PROCEDURE — G8984 CARRY CURRENT STATUS: HCPCS

## 2017-09-05 PROCEDURE — 97140 MANUAL THERAPY 1/> REGIONS: CPT

## 2017-09-05 PROCEDURE — G8985 CARRY GOAL STATUS: HCPCS

## 2017-09-05 PROCEDURE — 97110 THERAPEUTIC EXERCISES: CPT

## 2017-09-05 NOTE — PROGRESS NOTES
Mike Ding  : 1950 [id number] [facility],2[address]  [address] [place] [address].  [phone]   [phone]        OUTPATIENT PHYSICAL THERAPY:Daily Note and Progress Report 2017        ICD-10: Treatment Diagnosis: Impingement syndrome of left shoulder (M75.42); Pain in left shoulder (M25.512)  Precautions/Allergies:   Sulfa (sulfonamide antibiotics) and Ultram [tramadol]   Fall Risk Score: 0 (? 5 = High Risk)  MD Orders: evaluate and treat MEDICAL/REFERRING DIAGNOSIS:  Left shoulder pain [M25.512]   DATE OF ONSET: gradual onset over the past few months  REFERRING PHYSICIAN: Jayro Laureano MD  RETURN PHYSICIAN APPOINTMENT: 17     INITIAL ASSESSMENT:  Ms. Ruiz Urbina presents to therapy with left shoulder pain secondary to subacromial impingement syndrome. She has restricted mobility of the posterior capsule and tightness of the rotator cuff contributing to difficulty with reaching behind her back. She demonstrates weakness through the posterior rotator cuff due to atrophy and neuromuscular inhibition from pain causing difficulty with carrying tasks and reaching overhead. She has specific pain with movements into extension and functional internal rotation. Skilled therapy is required to return to prior level of function. PROBLEM LIST (Impacting functional limitations):  1. Decreased Strength  2. Decreased ADL/Functional Activities  3. Increased Pain  4. Decreased Activity Tolerance  5. Decreased Flexibility/Joint Mobility INTERVENTIONS PLANNED:  1. Cryotherapy  2. Electrical Stimulation  3. Family Education  4. Home Exercise Program (HEP)  5. Manual Therapy  6. Neuromuscular Re-education/Strengthening  7. Range of Motion (ROM)  8. Therapeutic Activites  9. Therapeutic Exercise/Strengthening  10. modalities   TREATMENT PLAN:  Effective Dates: 17 TO 17.   Frequency/Duration: 2 times a week for 6 weeks  GOALS: (Goals have been discussed and agreed upon with patient.)  Short-Term Functional Goals: Time Frame: 2 weeks  1. Patient will be independent with HEP. MET  2. Patient will report pain <3/10 with all daily activity. met  Discharge Goals: Time Frame: 6 weeks  1. Patient will have no pain with all daily activity. NOT MET  2. Patient will improve shoulder ER to 5/5 to restore dynamic stability required for overhead reaching tasks. PROGRESSING  3. Patient will improve functional IR to T7 to normalize posterior shoulder mobility for tasks such as washing her back or fastening a belt. PROGRESSING  Rehabilitation Potential For Stated Goals: Excellent                HISTORY:   History of Present Injury/Illness (Reason for Referral):  Patient presents to therapy with primary complaint of left shoulder pain that she describes as a painful stiffness with occasional throbbing and dull pain. She notes no specific mechanism of injury but reports symptoms have gradually worsened over the past few months. She has increased pain with reaching behind her back or reaching into extension. She also has difficulty with falling asleep as she can't get comfortable when laying in bed. She has recently undergone cortisone injection with good effect but notes the shoulder continues to feel stiff. Past Medical History/Comorbidities:   Ms. Ruben Pittman  has a past medical history of Arthritis; Chronic pain; GERD (gastroesophageal reflux disease); Insomnia; and Liver disease. She also has no past medical history of Adverse effect of anesthesia; Aneurysm ([address] Arrhythmia; Asthma; Autoimmune disease ([address] CAD (coronary artery disease); Cancer ([address] Chronic kidney disease; Chronic obstructive pulmonary disease ([address] Coagulation disorder ([address] Diabetes ([address] Difficult intubation; Heart failure ([address] Hypertension; Malignant hyperthermia due to anesthesia; Nausea & vomiting;  Other unknown and unspecified cause of morbidity or mortality; Pseudocholinesterase deficiency; Psychiatric disorder; PUD (peptic ulcer disease); Seizures ([facility] [address] Stroke [facility]); Thromboembolus ([facility] [address] Thyroid disease; or Unspecified sleep apnea. Ms. Susy Hong  has a past surgical history that includes heent; tonsillectomy (1961); lap cholecystectomy; carpal tunnel release (Left); carpal tunnel release (Right); orthopaedic (Left); orthopaedic (Left); hysterectomy; knee arthroscopy (Right); and bunionectomy (Right). Social History/Living Environment:     Patient lives at home with her family. Prior Level of Function/Work/Activity:  Patient is retired. Dominant Side:         RIGHT    Current Medications:    Current Outpatient Prescriptions:     amitriptyline (ELAVIL) 25 mg tablet, Take 25 mg by mouth nightly., Disp: , Rfl:     diclofenac EC (VOLTAREN) 75 mg EC tablet, Take 75 mg by mouth two (2) times a day., Disp: , Rfl:     omeprazole (PRILOSEC) 20 mg capsule, Take 20 mg by mouth Daily (before breakfast). Take / use AM day of surgery  per anesthesia protocols. Indications: GASTROESOPHAGEAL REFLUX, Disp: , Rfl:     HYDROcodone-acetaminophen (NORCO) 5-325 mg per tablet, Take 2 Tabs by mouth nightly. Indications: PAIN, Disp: , Rfl:     multivitamin (ONE A DAY) tablet, Take 1 Tab by mouth daily. , Disp: , Rfl:     VITAMIN E, DL,TOCOPHERYL ACET, (VITAMIN E ACETATE) 400 unit cap capsule, Take 400 Units by mouth every other day., Disp: , Rfl:     magnesium oxide (MAG-OX) 400 mg tablet, Take 400 mg by mouth daily. , Disp: , Rfl:     calcium 500 mg tab, Take 1 Tab by mouth every other day., Disp: , Rfl:     b complex vitamins (B COMPLEX 1) tablet, Take 1 Tab by mouth every other day., Disp: , Rfl:     ascorbic acid (VITAMIN C) 500 mg tablet, Take 500 mg by mouth daily. , Disp: , Rfl:    Date Last Reviewed:  9/5/2017   EXAMINATION:   Observation/Orthostatic Postural Assessment:          Mild increase in thoracic kyphosis with abducted scapular position. No gross deformity or atrophy noted.   Palpation:          Tenderness present at subacromial space and posterior glenohumeral joint. ROM:     Flexion: 140 ° active; 150 ° passive with pain on overpressure L; 170 ° R  ER: 70 ° L with pain at end range; 90 ° R  Hand behind head: full and symmetric  IR: 40 ° L with pain; 80 ° R  Functional IR: finger to L3 L; T7 R   09/05/17 update:   Flexion: 160 ° L  ER: 90 ° in scaption and pain free  IR: 70 ° in scaption with pain at end range   Strength:     Scaption: 4/5 L with pain; 5/5 R  ER: 4/5 L with pain; 5/5 R  IR: 4+/5 L; 5/5 R  Elbow flexion: 5/5 B  Elbow extension: 5/5 B   09/05/17 update:   Scaption: 4+/5 L with pain  ER: 4+/5 and pain free  IR: 4+/5 L   Special Tests:          Neer (+); Montgomery (+); Drop arm (-); Lift off (-). Neurological Screen:        Grossly intact  Functional Mobility:         Independent  Balance: WNL   Body Structures Involved:  1. Nerves  2. Bones  3. Joints  4. Muscles  5. Ligaments Body Functions Affected:  1. Sensory/Pain  2. Neuromusculoskeletal  3. Movement Related Activities and Participation Affected:  1. General Tasks and Demands  2. Mobility  3. Self Care  4. Domestic Life  5. Interpersonal Interactions and Relationships   CLINICAL PRESENTATION:   CLINICAL DECISION MAKING:   Outcome Measure: Tool Used: SPADI  Score:  Initial: 20/130 Most Recent: 15/130 (Date: 09/05/17 )   Interpretation of Score: 15% impaired with activities that involve use of the left arm  Outcome Measure Conversion Site    ? Carrying, Moving, and Handling Objects:     - CURRENT STATUS: CI - 1%-19% impaired, limited or restricted    - GOAL STATUS: CH - 0% impaired, limited or restricted    - D/C STATUS:  ---------------To be determined---------------         Medical Necessity:   · Patient is expected to demonstrate progress in strength, range of motion, balance and coordination to decrease assistance required with activities that involve use of the left arm and to return to prior level of function.   Reason for Services/Other Comments:  · Patient has demonstrated an improvement in functional level by independent performance of HEP. TREATMENT:   (In addition to Assessment/Re-Assessment sessions the following treatments were rendered)  THERAPEUTIC EXERCISE: (see below for minutes):  Exercises per grid below to improve mobility, strength, balance and coordination. Required minimal verbal and manual cues to promote proper body alignment, promote proper body posture and promote proper body mechanics. Progressed resistance, range, repetitions and complexity of movement as indicated. MANUAL THERAPY: (see below for minutes): Joint mobilization and Soft tissue mobilization was utilized and necessary because of the patient's restricted joint motion, painful spasm, loss of articular motion and restricted motion of soft tissue.    MODALITIES: (see below for minutes):      to decrease pain     Date: 08/04/17 08/07/17 (visit 2) 08/10/17 (visit 3) 08/14/17 (visit 4) 8-16-17  (Visit 5)  08/21/17 (visit 6) 08/23/17 (visit 7) 08/28/17 (Visit 8) 8-30-17  (Visit 9) 09/05/17 (visit 10)   Modalities:  10 min 10 min 10 min 10 mins  10 min 10 min        Ice x 10 min  Ice x 10 min Ice x 10 min Repeat  10 min 10 min                                Therapeutic Exercise: 15 min 25 min 25 min 25 min 25 mins  25 min 30 min 30 min 30 mins  30 min   Towel IR stretch [id number] Cane extension [id number]           Horizontal adduction stretch 5x5sec            Supine ER stretch [id number]            Seated W Red tband 30x            Band pull aparts Red tband 30x     Red tband 2x15; D2 band pull aparts 2x15 Extension blue tband B 2x15      UBE  5 min L  5 min L5  5 min L5  5 min L5 5 min L5 5 min L5 Repeat  5 min L5   D2  Against manual resistance 2x10 Against manual resistance 30x Against manual resistance 30x Repeat  repeat Against manual resistance 30x Against manual resistance 30x Repeat  Against manual resistance 2x15   ER  2# 3x8  2# 2x12 Repeat SL Yellow 2x15 Repeat; sidelying horizontal abduction 1# 2x12 Repeat  Yellow 2x15; 2# sidelying 2x15   Abduction  Sidelying 2# 2x12 Sidelying 2# 2x15 Sidelying 2# 2x12 Repeat  2# 2x15 2# 2x15 2# 2x15 sidelying Repeat  2# 2x15 sidelying   Row  Blue 3x10 Black 2x15 15# single arm 2x15 17.5# repeat  15# 2x15 Green 2x15 Black B 2x15 Repeat  Black B 2x15   IR   Red 2x15 Red 2x15   Yellow 2x15 Red 2x15 Red 2x15 Repeat  Red 2x15   Extension  Green B with UE ER 2x10 Green B with UE ER 2x15; red 15x  Red 2x15 Repeat  Red 2x15; standing extension stretch [id number] See above Red 2x15; standing extension B with UE ER 2x15 Repeat  Red 2x15   Scaption   2# 2x10 2# 2x12; supine punch 3# 2x15; wall wash x10, 2# x15 with OKC lift off Repeat ll a repeat Wall ball roll [id number]; punch yellow 2x15; scapular press up 3# 2x15 Repeat  2# wall slide with OKC lower   Pulleys      Flexion x30   Wall wash 2# with OKC lower 2x10 Repeat     Proprioceptive Activities:                                                    Manual Therapy: 5 min 15 min 15 min 15 min 15 mins  15 min 15 min 15 min 15 mins  15 min   Passive physiologic mobilizations Aggressive, focusing on IR of the GHJ Gr 3 to 3++ focusing on IR repeat repeat Repeat  Gr 2 to 3 into flexion, ER and IR repeat repeat Repeat  Gr 3 to 3++ focusing on IR                Therapeutic Activities:                                                                 HEP: see 08/04/17 flow sheet for specifics. Treatment/Session Assessment: Pt is still limited in IR and all other movements are [facility]. Pt requested to continue 2 more weeks to focus on IR and stretching due to being limited with showering and other daily activities into the movement. Progression of strengthening into ER and horizontal abduction are now pain free. Comparable sign remains into IR both in scaption and with hand behind back. · Pain/ Symptoms: Initial:   0/10 \"It's not really hurting right now.  It generally feels fine during the day, but it really bothers me at night still. I haven't really been sleeping well because of it. \" Post Session:  No increase following today's treatment session. ·   Compliance with Program/Exercises: Will assess as treatment progresses. · Recommendations/Intent for next treatment session: \"Next visit will focus on advancements to more challenging activities\".   Total Treatment Duration:  PT Patient Time In/Time Out  Time In: 0930  Time Out: 0804 [facility], [facility]

## 2017-09-07 ENCOUNTER — HOSPITAL ENCOUNTER (OUTPATIENT)
Dept: PHYSICAL THERAPY | Age: 67
Discharge: HOME | End: 2017-09-07
Payer: MEDICARE

## 2017-09-07 PROCEDURE — 97110 THERAPEUTIC EXERCISES: CPT

## 2017-09-07 PROCEDURE — 97140 MANUAL THERAPY 1/> REGIONS: CPT

## 2017-09-07 NOTE — PROGRESS NOTES
Georgia Weiss  : 1950 [id number] [facility],[address]  [address] [place] [address].  [phone]   [phone]        OUTPATIENT PHYSICAL THERAPY:Daily Note 2017        ICD-10: Treatment Diagnosis: Impingement syndrome of left shoulder (M75.42); Pain in left shoulder (M25.512)  Precautions/Allergies:   Sulfa (sulfonamide antibiotics) and Ultram [tramadol]   Fall Risk Score: 0 (? 5 = High Risk)  MD Orders: evaluate and treat MEDICAL/REFERRING DIAGNOSIS:  Left shoulder pain [M25.512]   DATE OF ONSET: gradual onset over the past few months  REFERRING PHYSICIAN: Chet Morrissey MD  RETURN PHYSICIAN APPOINTMENT: 17     INITIAL ASSESSMENT:  Ms. Kay Reyes presents to therapy with left shoulder pain secondary to subacromial impingement syndrome. She has restricted mobility of the posterior capsule and tightness of the rotator cuff contributing to difficulty with reaching behind her back. She demonstrates weakness through the posterior rotator cuff due to atrophy and neuromuscular inhibition from pain causing difficulty with carrying tasks and reaching overhead. She has specific pain with movements into extension and functional internal rotation. Skilled therapy is required to return to prior level of function. PROBLEM LIST (Impacting functional limitations):  1. Decreased Strength  2. Decreased ADL/Functional Activities  3. Increased Pain  4. Decreased Activity Tolerance  5. Decreased Flexibility/Joint Mobility INTERVENTIONS PLANNED:  1. Cryotherapy  2. Electrical Stimulation  3. Family Education  4. Home Exercise Program (HEP)  5. Manual Therapy  6. Neuromuscular Re-education/Strengthening  7. Range of Motion (ROM)  8. Therapeutic Activites  9. Therapeutic Exercise/Strengthening  10. modalities   TREATMENT PLAN:  Effective Dates: 17 TO 17.   Frequency/Duration: 2 times a week for 6 weeks  GOALS: (Goals have been discussed and agreed upon with patient.)  Short-Term Functional Goals: Time Frame: 2 weeks  1. Patient will be independent with HEP. MET  2. Patient will report pain <3/10 with all daily activity. met  Discharge Goals: Time Frame: 6 weeks  1. Patient will have no pain with all daily activity. NOT MET  2. Patient will improve shoulder ER to 5/5 to restore dynamic stability required for overhead reaching tasks. PROGRESSING  3. Patient will improve functional IR to T7 to normalize posterior shoulder mobility for tasks such as washing her back or fastening a belt. PROGRESSING  Rehabilitation Potential For Stated Goals: Excellent                HISTORY:   History of Present Injury/Illness (Reason for Referral):  Patient presents to therapy with primary complaint of left shoulder pain that she describes as a painful stiffness with occasional throbbing and dull pain. She notes no specific mechanism of injury but reports symptoms have gradually worsened over the past few months. She has increased pain with reaching behind her back or reaching into extension. She also has difficulty with falling asleep as she can't get comfortable when laying in bed. She has recently undergone cortisone injection with good effect but notes the shoulder continues to feel stiff. Past Medical History/Comorbidities:   Ms. Renee Islas  has a past medical history of Arthritis; Chronic pain; GERD (gastroesophageal reflux disease); Insomnia; and Liver disease. She also has no past medical history of Adverse effect of anesthesia; Aneurysm ([address] Arrhythmia; Asthma; Autoimmune disease ([address] CAD (coronary artery disease); Cancer ([address] Chronic kidney disease; Chronic obstructive pulmonary disease ([address] Coagulation disorder ([address] Diabetes ([address] Difficult intubation; Heart failure ([address] Hypertension; Malignant hyperthermia due to anesthesia; Nausea & vomiting;  Other unknown and unspecified cause of morbidity or mortality; Pseudocholinesterase deficiency; Psychiatric disorder; PUD (peptic ulcer disease); Seizures ([facility] [address] Stroke [facility]); Thromboembolus ([facility] [address] Thyroid disease; or Unspecified sleep apnea. Ms. Leeann Whittington  has a past surgical history that includes heent; tonsillectomy (1961); lap cholecystectomy; carpal tunnel release (Left); carpal tunnel release (Right); orthopaedic (Left); orthopaedic (Left); hysterectomy; knee arthroscopy (Right); and bunionectomy (Right). Social History/Living Environment:     Patient lives at home with her family. Prior Level of Function/Work/Activity:  Patient is retired. Dominant Side:         RIGHT    Current Medications:    Current Outpatient Prescriptions:     amitriptyline (ELAVIL) 25 mg tablet, Take 25 mg by mouth nightly., Disp: , Rfl:     diclofenac EC (VOLTAREN) 75 mg EC tablet, Take 75 mg by mouth two (2) times a day., Disp: , Rfl:     omeprazole (PRILOSEC) 20 mg capsule, Take 20 mg by mouth Daily (before breakfast). Take / use AM day of surgery  per anesthesia protocols. Indications: GASTROESOPHAGEAL REFLUX, Disp: , Rfl:     HYDROcodone-acetaminophen (NORCO) 5-325 mg per tablet, Take 2 Tabs by mouth nightly. Indications: PAIN, Disp: , Rfl:     multivitamin (ONE A DAY) tablet, Take 1 Tab by mouth daily. , Disp: , Rfl:     VITAMIN E, DL,TOCOPHERYL ACET, (VITAMIN E ACETATE) 400 unit cap capsule, Take 400 Units by mouth every other day., Disp: , Rfl:     magnesium oxide (MAG-OX) 400 mg tablet, Take 400 mg by mouth daily. , Disp: , Rfl:     calcium 500 mg tab, Take 1 Tab by mouth every other day., Disp: , Rfl:     b complex vitamins (B COMPLEX 1) tablet, Take 1 Tab by mouth every other day., Disp: , Rfl:     ascorbic acid (VITAMIN C) 500 mg tablet, Take 500 mg by mouth daily. , Disp: , Rfl:    Date Last Reviewed:  9/7/2017   EXAMINATION:   Observation/Orthostatic Postural Assessment:          Mild increase in thoracic kyphosis with abducted scapular position. No gross deformity or atrophy noted.   Palpation:          Tenderness present at subacromial space and posterior glenohumeral joint. ROM:     Flexion: 140 ° active; 150 ° passive with pain on overpressure L; 170 ° R  ER: 70 ° L with pain at end range; 90 ° R  Hand behind head: full and symmetric  IR: 40 ° L with pain; 80 ° R  Functional IR: finger to L3 L; T7 R   09/05/17 update:   Flexion: 160 ° L  ER: 90 ° in scaption and pain free  IR: 70 ° in scaption with pain at end range   Strength:     Scaption: 4/5 L with pain; 5/5 R  ER: 4/5 L with pain; 5/5 R  IR: 4+/5 L; 5/5 R  Elbow flexion: 5/5 B  Elbow extension: 5/5 B   09/05/17 update:   Scaption: 4+/5 L with pain  ER: 4+/5 and pain free  IR: 4+/5 L   Special Tests:          Neer (+); Montgomery (+); Drop arm (-); Lift off (-). Neurological Screen:        Grossly intact  Functional Mobility:         Independent  Balance: WNL   Body Structures Involved:  1. Nerves  2. Bones  3. Joints  4. Muscles  5. Ligaments Body Functions Affected:  1. Sensory/Pain  2. Neuromusculoskeletal  3. Movement Related Activities and Participation Affected:  1. General Tasks and Demands  2. Mobility  3. Self Care  4. Domestic Life  5. Interpersonal Interactions and Relationships   CLINICAL PRESENTATION:   CLINICAL DECISION MAKING:   Outcome Measure: Tool Used: SPADI  Score:  Initial: 20/130 Most Recent: 15/130 (Date: 09/05/17 )   Interpretation of Score: 15% impaired with activities that involve use of the left arm  Outcome Measure Conversion Site    ? Carrying, Moving, and Handling Objects:     - CURRENT STATUS: CI - 1%-19% impaired, limited or restricted    - GOAL STATUS: CH - 0% impaired, limited or restricted    - D/C STATUS:  ---------------To be determined---------------         Medical Necessity:   · Patient is expected to demonstrate progress in strength, range of motion, balance and coordination to decrease assistance required with activities that involve use of the left arm and to return to prior level of function.   Reason for Services/Other Comments:  · Patient has demonstrated an improvement in functional level by independent performance of HEP. TREATMENT:   (In addition to Assessment/Re-Assessment sessions the following treatments were rendered)  THERAPEUTIC EXERCISE: (see below for minutes):  Exercises per grid below to improve mobility, strength, balance and coordination. Required minimal verbal and manual cues to promote proper body alignment, promote proper body posture and promote proper body mechanics. Progressed resistance, range, repetitions and complexity of movement as indicated. MANUAL THERAPY: (see below for minutes): Joint mobilization and Soft tissue mobilization was utilized and necessary because of the patient's restricted joint motion, painful spasm, loss of articular motion and restricted motion of soft tissue.    MODALITIES: (see below for minutes):      to decrease pain     Date: 08/21/17 (visit 6) 08/23/17 (visit 7) 08/28/17 (Visit 8) 8-30-17  (Visit 9) 09/05/17 (visit 10) progress note 09/07/17 (visit 11)     Modalities: 10 min 10 min          10 min 10 min                               Therapeutic Exercise: 25 min 30 min 30 min 30 mins  30 min 30 min     Towel IR stretch           Horizontal adduction stretch           Supine ER stretch           Seated W           Band pull aparts Red tband 2x15; D2 band pull aparts 2x15 Extension blue tband B 2x15         UBE 5 min L5 5 min L5 5 min L5 Repeat  5 min L5 5 min L5     D2 repeat Against manual resistance 30x Against manual resistance 30x Repeat  Against manual resistance 2x15 repeat     ER  Yellow 2x15 Repeat; sidelying horizontal abduction 1# 2x12 Repeat  Yellow 2x15; 2# sidelying 2x15 2# sidelying 2x15     Abduction 2# 2x15 2# 2x15 2# 2x15 sidelying Repeat  2# 2x15 sidelying 2# sidelying 2x15; 2# 2x12 sidelying horizontal abduction     Row 15# 2x15 Green 2x15 Black B 2x15 Repeat  Black B 2x15      IR  Yellow 2x15 Red 2x15 Red 2x15 Repeat  Red 2x15 Red 2x15 Extension Red 2x15; standing extension stretch [id number] See above Red 2x15; standing extension B with UE ER 2x15 Repeat  Red 2x15 Red 2x15     Scaption repeat Wall ball roll [id number]; punch yellow 2x15; scapular press up 3# 2x15 Repeat  2# wall slide with OKC lower 2# wall slide with OKC lower; circles on wall [id number]     Pulleys    Wall wash 2# with OKC lower 2x10 Repeat        Proprioceptive Activities:                                            Manual Therapy: 15 min 15 min 15 min 15 mins  15 min 10 min     Passive physiologic mobilizations Gr 2 to 3 into flexion, ER and IR repeat repeat Repeat  Gr 3 to 3++ focusing on IR Repeat, progressing to gr 4                Therapeutic Activities:                                                       HEP: see 08/04/17 flow sheet for specifics. Treatment/Session Assessment: Will continue x 1 visit prior to follow up with MD.     · Pain/ Symptoms: Initial:   0/10 \"The past 2 nights have actually felt pretty good. It's still not really bothering me during the day. Post Session:  No increase following today's treatment session. ·   Compliance with Program/Exercises: Will assess as treatment progresses. · Recommendations/Intent for next treatment session: \"Next visit will focus on advancements to more challenging activities\".   Total Treatment Duration:  PT Patient Time In/Time Out  Time In: 0935  Time Out: 1452 [facility] RANI

## 2017-09-11 ENCOUNTER — HOSPITAL ENCOUNTER (OUTPATIENT)
Dept: PHYSICAL THERAPY | Age: 67
Discharge: HOME | End: 2017-09-11
Payer: MEDICARE

## 2017-09-11 PROCEDURE — 97140 MANUAL THERAPY 1/> REGIONS: CPT

## 2017-09-11 PROCEDURE — 97110 THERAPEUTIC EXERCISES: CPT

## 2017-09-11 NOTE — PROGRESS NOTES
Daniel Sam   ([facility]:8/9/2734) [address]  [phone]   [phone]           PHYSICIAN COMMUNICATION and discharge    REFERRING PHYSICIAN: Osman Gagnon MD  Return Physician Appointment: 09/11/17  MEDICAL/REFERRING DIAGNOSIS:  · Left shoulder pain [M25.512]  ATTENDANCE: Daniel Sam has attended 12 out of 12 visits, with 0 cancellation(s) and 0 no shows. ASSESSMENT:  DATE: 9/11/2017    PROGRESS: Daniel Sam has made moderate progress with physical therapy. She has no symptoms at rest and minimal shoulder discomfort through the day or with activity. Primary irritating movement is with positions into shoulder extension or with her hand behind her back. This movement is primarily restricted by tightness of the posterior rotator cuff as internal rotation is 60 ° and painful. Active elevation is full and symmetric with the uninvolved. Strength is 4/5 with pain into elevation, 4+/5 into ER and 4+/5 into IR. Of greatest significance, she continues to have difficulty with sleeping due to pain. RECOMMENDATIONS: She is independent with a home program to continue to improve shoulder mobility and strength.      Thank you for this referral,  Niko Brooks DPT

## 2017-09-13 ENCOUNTER — APPOINTMENT (OUTPATIENT)
Dept: PHYSICAL THERAPY | Age: 67
End: 2017-09-13
Payer: MEDICARE

## 2017-10-30 RX ORDER — CEFAZOLIN SODIUM IN 0.9 % NACL 2 G/50 ML
2 INTRAVENOUS SOLUTION, PIGGYBACK (ML) INTRAVENOUS ONCE
Status: CANCELLED | OUTPATIENT
Start: 2017-10-30 | End: 2017-10-30

## 2017-11-06 ENCOUNTER — HOSPITAL ENCOUNTER (OUTPATIENT)
Dept: SURGERY | Age: 67
Discharge: HOME | End: 2017-11-06
Payer: MEDICARE

## 2017-11-06 ENCOUNTER — HOSPITAL ENCOUNTER (OUTPATIENT)
Dept: PHYSICAL THERAPY | Age: 67
Discharge: HOME | End: 2017-11-06
Payer: MEDICARE

## 2017-11-06 VITALS
WEIGHT: 234 LBS | DIASTOLIC BLOOD PRESSURE: 95 MMHG | OXYGEN SATURATION: 96 % | HEART RATE: 71 BPM | SYSTOLIC BLOOD PRESSURE: 153 MMHG | HEIGHT: 66 IN | BODY MASS INDEX: 37.61 KG/M2 | TEMPERATURE: 96.5 F

## 2017-11-06 LAB
ALBUMIN SERPL-MCNC: 3.8 G/DL (ref 3.2–4.6)
ANION GAP SERPL CALC-SCNC: 9 MMOL/L (ref 7–16)
APPEARANCE UR: CLEAR
APTT PPP: 27.1 SEC (ref 23.5–31.7)
ATRIAL RATE: 63 BPM
BACTERIA SPEC CULT: NORMAL
BASOPHILS # BLD: 0 K/UL (ref 0–0.2)
BASOPHILS NFR BLD: 1 % (ref 0–2)
BILIRUB UR QL: NEGATIVE
BUN SERPL-MCNC: 14 MG/DL (ref 8–23)
CALCIUM SERPL-MCNC: 9.1 MG/DL (ref 8.3–10.4)
CALCULATED P AXIS, ECG09: 53 DEGREES
CALCULATED R AXIS, ECG10: 8 DEGREES
CALCULATED T AXIS, ECG11: 44 DEGREES
CHLORIDE SERPL-SCNC: 107 MMOL/L (ref 98–107)
CO2 SERPL-SCNC: 29 MMOL/L (ref 21–32)
COLOR UR: YELLOW
CREAT SERPL-MCNC: 0.69 MG/DL (ref 0.6–1)
DIAGNOSIS, 93000: NORMAL
DIFFERENTIAL METHOD BLD: ABNORMAL
EOSINOPHIL # BLD: 0.1 K/UL (ref 0–0.8)
EOSINOPHIL NFR BLD: 3 % (ref 0.5–7.8)
ERYTHROCYTE [DISTWIDTH] IN BLOOD BY AUTOMATED COUNT: 14.4 % (ref 11.9–14.6)
EST. AVERAGE GLUCOSE BLD GHB EST-MCNC: 117 MG/DL
GLUCOSE SERPL-MCNC: 94 MG/DL (ref 65–100)
GLUCOSE UR STRIP.AUTO-MCNC: NEGATIVE MG/DL
HBA1C MFR BLD: 5.7 % (ref 4.8–6)
HCT VFR BLD AUTO: 43.6 % (ref 35.8–46.3)
HGB BLD-MCNC: 14.5 G/DL (ref 11.7–15.4)
HGB UR QL STRIP: NEGATIVE
IMM GRANULOCYTES # BLD: 0 K/UL (ref 0–0.5)
IMM GRANULOCYTES NFR BLD: 0 % (ref 0–5)
INR PPP: 1 (ref 0.9–1.2)
KETONES UR QL STRIP.AUTO: NEGATIVE MG/DL
LEUKOCYTE ESTERASE UR QL STRIP.AUTO: NEGATIVE
LYMPHOCYTES # BLD: 1.2 K/UL (ref 0.5–4.6)
LYMPHOCYTES NFR BLD: 31 % (ref 13–44)
MCH RBC QN AUTO: 28.3 PG (ref 26.1–32.9)
MCHC RBC AUTO-ENTMCNC: 33.3 G/DL (ref 31.4–35)
MCV RBC AUTO: 85.2 FL (ref 79.6–97.8)
MONOCYTES # BLD: 0.3 K/UL (ref 0.1–1.3)
MONOCYTES NFR BLD: 7 % (ref 4–12)
NEUTS SEG # BLD: 2.3 K/UL (ref 1.7–8.2)
NEUTS SEG NFR BLD: 58 % (ref 43–78)
NITRITE UR QL STRIP.AUTO: NEGATIVE
P-R INTERVAL, ECG05: 174 MS
PH UR STRIP: 7 [PH] (ref 5–9)
PLATELET # BLD AUTO: 191 K/UL (ref 150–450)
PMV BLD AUTO: 9.6 FL (ref 10.8–14.1)
POTASSIUM SERPL-SCNC: 4 MMOL/L (ref 3.5–5.1)
PROT UR STRIP-MCNC: NEGATIVE MG/DL
PROTHROMBIN TIME: 10.4 SEC (ref 9.6–12)
Q-T INTERVAL, ECG07: 418 MS
QRS DURATION, ECG06: 84 MS
QTC CALCULATION (BEZET), ECG08: 427 MS
RBC # BLD AUTO: 5.12 M/UL (ref 4.05–5.25)
SERVICE CMNT-IMP: NORMAL
SODIUM SERPL-SCNC: 145 MMOL/L (ref 136–145)
SP GR UR REFRACTOMETRY: 1.01 (ref 1–1.02)
UROBILINOGEN UR QL STRIP.AUTO: 0.2 EU/DL (ref 0.2–1)
VENTRICULAR RATE, ECG03: 63 BPM
WBC # BLD AUTO: 4 K/UL (ref 4.3–11.1)

## 2017-11-06 PROCEDURE — G8980 MOBILITY D/C STATUS: HCPCS

## 2017-11-06 PROCEDURE — 85025 COMPLETE CBC W/AUTO DIFF WBC: CPT | Performed by: ORTHOPAEDIC SURGERY

## 2017-11-06 PROCEDURE — 83036 HEMOGLOBIN GLYCOSYLATED A1C: CPT | Performed by: ORTHOPAEDIC SURGERY

## 2017-11-06 PROCEDURE — 80048 BASIC METABOLIC PNL TOTAL CA: CPT | Performed by: ORTHOPAEDIC SURGERY

## 2017-11-06 PROCEDURE — 85610 PROTHROMBIN TIME: CPT | Performed by: ORTHOPAEDIC SURGERY

## 2017-11-06 PROCEDURE — 85730 THROMBOPLASTIN TIME PARTIAL: CPT | Performed by: ORTHOPAEDIC SURGERY

## 2017-11-06 PROCEDURE — G8978 MOBILITY CURRENT STATUS: HCPCS

## 2017-11-06 PROCEDURE — 80307 DRUG TEST PRSMV CHEM ANLYZR: CPT | Performed by: ORTHOPAEDIC SURGERY

## 2017-11-06 PROCEDURE — 87641 MR-STAPH DNA AMP PROBE: CPT | Performed by: ORTHOPAEDIC SURGERY

## 2017-11-06 PROCEDURE — 93005 ELECTROCARDIOGRAM TRACING: CPT | Performed by: ORTHOPAEDIC SURGERY

## 2017-11-06 PROCEDURE — 82040 ASSAY OF SERUM ALBUMIN: CPT | Performed by: ORTHOPAEDIC SURGERY

## 2017-11-06 PROCEDURE — G8979 MOBILITY GOAL STATUS: HCPCS

## 2017-11-06 PROCEDURE — 81003 URINALYSIS AUTO W/O SCOPE: CPT | Performed by: ORTHOPAEDIC SURGERY

## 2017-11-06 PROCEDURE — 77030027138 HC INCENT SPIROMETER -A

## 2017-11-06 PROCEDURE — 97161 PT EVAL LOW COMPLEX 20 MIN: CPT

## 2017-11-06 NOTE — PROGRESS NOTES
Mirzaor Darionjulianne  : (3 y.o.) [facility]  at [address], [place] [address].  [phone]       Physical Therapy Prehab Plan of Treatment and Evaluation Summary:2017    ICD-10: Treatment Diagnosis:   · Pain in Right Knee (M25.561)  · Stiffness of Right Knee, Not elsewhere classified (M25.661)  · Difficulty in walking, Not elsewhere classified (R26.2)  Precautions/Allergies:   Sulfa (sulfonamide antibiotics); Trazodone; and Ultram [tramadol]  MEDICAL/REFERRING DIAGNOSIS:  Unilateral primary osteoarthritis, right knee [M17.11]  REFERRING PHYSICIAN: Garrick Velasco MD  DATE OF SURGERY: 11/15/17   Assessment:   Comments:  Pt. Lives alone but plans to go home with support from mom and son. She reports needing a left tka as well. PROBLEM LIST (Impacting functional limitations):  Ms. Clotilde Rausch presents with the following right lower extremity(s) problems:  1. Strength  2. Range of Motion  3. Home Exercise Program  4. Pain   INTERVENTIONS PLANNED:  1. Home Exercise Program  2. Educational Discussion     TREATMENT PLAN: Effective Dates: 2017 TO 2017. Frequency/Duration: Patient to continue to perform home exercise program at least twice per day up until her surgery. GOALS: (Goals have been discussed and agreed upon with patient.)  Discharge Goals: Time Frame: 1 Day  1. Patient will demonstrate independence with a home exercise program designed to increase strength, range of motion and pain control to minimize functional deficits and optimize patient for total joint replacement. Rehabilitation Potential For Stated Goals: Good  Regarding Ian Cole therapy, I certify that the treatment plan above will be carried out by a therapist or under their direction.   Thank you for this referral,  Inocencia Miller, PT               HISTORY:   Present Symptoms:  Pain Intensity 1:  (8 at worst)  Pain Location 1: Knee   History of Present Injury/Illness (Reason for Referral):  Medical/Referring Diagnosis: Unilateral primary osteoarthritis, right knee [M17.11]   Past Medical History/Comorbidities:   Ms. Shmuel Helms  has a past medical history of Arthritis; BMI 38.0-38.9,adult; Chronic pain; GERD (gastroesophageal reflux disease); Insomnia; and Liver disease. She also has no past medical history of Adverse effect of anesthesia; Aneurysm ([address] Arrhythmia; Asthma; Autoimmune disease ([facility] [address] CAD (coronary artery disease); Cancer ([facility] [address] Chronic kidney disease; Chronic obstructive pulmonary disease ([address] Coagulation disorder ([address] Diabetes ([facility] [address] Difficult intubation; Endocarditis; Heart failure ([address] Hypertension; Malignant hyperthermia due to anesthesia; Nausea & vomiting; Nicotine vapor product user; Non-nicotine vapor product user; Other unknown and unspecified cause of morbidity or mortality; Pseudocholinesterase deficiency; Psychiatric disorder; PUD (peptic ulcer disease); Rheumatic fever; Seizures ([facility] [address] Stroke [facility]); Thromboembolus ([facility] [address] Thyroid disease; or Unspecified sleep apnea. Ms. Shmuel Helms  has a past surgical history that includes heent; tonsillectomy (1961); lap cholecystectomy; carpal tunnel release (Left); carpal tunnel release (Right); orthopaedic (Left); orthopaedic (Left); hysterectomy; knee arthroscopy (Right); bunionectomy (Right); and rotator cuff repair (Right).   Social History/Living Environment:   Home Environment: Private residence  # Steps to Enter: 3  Rails to Enter: No  One/Two Story Residence: Two story, live on 1st floor  # of Interior Steps: 15  Interior Rails: Left  Lift Chair Available: No  Living Alone: Yes  Support Systems: Child(scott), Parent  Patient Expects to be Discharged to[de-identified] Private residence  Current DME Used/Available at Home: [address], straight, Crutches  Tub or Shower Type: Tub/Shower combination  Work/Activity:  retired  Dominant Side:  RIGHT  Current Medications:  See Pre-assessment nursing note   Number of Personal Factors/Comorbidities that affect the Plan of Care: 1-2: MODERATE COMPLEXITY   EXAMINATION:   ADLs (Current Functional Status):   Ambulation:  [x] Independent  [] Walk Indoors Only  [] Walk Outdoors  [] Use Assistive Device  [] Use Wheelchair Only Dressing:  [x] Independent  Requires Assistance from Someone for:  [] Sock/Shoes  [] Pants  [] Everything   Bathing/Showering:   [x] Independent  [] Requires Assistance from Someone  [] Sponge Bath Only Household Activities:  [x] Routine house and yard work  [] Light Housework Only  [] None   Observation/Orthostatic Postural Assessment:       ROM/Flexibility:   Gross Assessment: Yes  AROM: Generally decreased, functional (left LE)                       RLE Assessment  RLE Assessment (WDL): Exceptions to WDL  RLE AROM  R Knee Flexion: 108  R Knee Extension: 8   Strength:   Gross Assessment: Yes  Strength: Generally decreased, functional (left LE)              RLE Strength  R Knee Flexion: 4  R Knee Extension: 4   Functional Mobility:    Gross Assessment: Yes    Gait Description (WDL): Exceptions to WDL  Stand to Sit: Independent, Additional time  Sit to Stand: Independent, Additional time  Distance (ft): 250 Feet (ft)  Ambulation - Level of Assistance: Independent  Speed/Francia: Delayed  Stance: Right decreased  Gait Abnormalities: Antalgic          Balance:    Sitting: Intact  Standing: Intact   Body Structures Involved:  1. Bones  2. Joints  3. Muscles  4. Ligaments Body Functions Affected:  1. Movement Related Activities and Participation Affected:  1. Mobility   Number of elements that affect the Plan of Care: 3: MODERATE COMPLEXITY   CLINICAL PRESENTATION:   Presentation: Stable and uncomplicated: LOW COMPLEXITY   CLINICAL DECISION MAKING:   Outcome Measure:    Tool Used: Lower Extremity Functional Scale (LEFS)  Score:  Initial: 44/80 Most Recent: X/80 (Date: -- )   Interpretation of Score: 20 questions each scored on a 5 point scale with 0 representing \"extreme difficulty or unable to perform\" and 4 representing \"no difficulty\". The lower the score, the greater the functional disability. 80/80 represents no disability. Minimal detectable change is 9 points. Score 80 79-65 64-49 48-33 32-17 16-1 0   Modifier CH CI CJ CK CL CM CN     ? Mobility - Walking and Moving Around:     - CURRENT STATUS: CK - 40%-59% impaired, limited or restricted    - GOAL STATUS: CK - 40%-59% impaired, limited or restricted    - D/C STATUS:  CK - 40%-59% impaired, limited or restricted    Medical Necessity:   · Ms. Onel Roque is expected to optimize her lower extremity strength and ROM in preparation for joint replacement surgery. Reason for Services/Other Comments:  · Achieve baseline assesment of musculoskeletal system, functional mobility and home environment. , educate in PT HEP in preparation for surgery, educate in hospital plan of care. Use of outcome tool(s) and clinical judgement create a POC that gives a: Clear prediction of patient's progress: LOW COMPLEXITY   TREATMENT:   Treatment/Session Assessment:  Patient was instructed in PT- HEP to increase strength and ROM in LEs. Answered all questions. · Post session pain:  No complaints  · Compliance with Program/Exercises: compliant most of the time.   Total Treatment Duration:PT Patient Time In/Time Out  Time In: 0714  Time Out: [place], PT

## 2017-11-06 NOTE — PERIOP NOTES
Patient verified name, , and surgery as listed in [facility]. Type 3 surgery, walk in assessment complete. Labs per surgeon: CBC, BMP, U/A, PT/PTT, Albumin, HGB-A1C, Urine nicotine, MRSA nasal swab ; results pending. Labs per anesthesia protocol: included in surgeons orders. EKG: done today; within [facility] protocols. Hibiclens and instructions to return bottle on DOS given per hospital policy. Nasal Swab collected per MD order and instructions for Mupirocin nasal ointment if required. Patient provided with handouts including Guide to Surgery, Pain Management, Hand Hygiene, Blood Transfusion Education, and [facility] Anesthesia Brochure. Patient answered medical/surgical history questions at their best of ability. All prior to admission medications documented in [facility]. Original medication prescription bottles not visualized during patient appointment. Patient instructed to hold all vitamins 7 days prior to surgery and NSAIDS 5 days prior to surgery. Medications to be held: vitamins/supplements, diclofenac. Patient instructed to continue previous medications as prescribed prior to surgery and to take the following medications the day of surgery according to anesthesia guidelines with a small sip of water: omeprazole, norco if needed. Patient teach back successful and patient demonstrates knowledge of instruction.

## 2017-11-06 NOTE — PROGRESS NOTES
11/06/17 0900   Oxygen Therapy   O2 Sat (%) 98 %   Pulse via Oximetry 70 beats per minute   O2 Device Room air   Pre-Treatment   Breath Sounds Bilateral Clear   Pre FEV1 (liters) 2.3 liters   % Predicted 91   Incentive Spirometry Treatment   Actual Volume (ml) 2250 ml     Initial respiratory Assessment completed with pt. Pt was interviewed and evaluated in [facility] prior to surgery. Patient ID:  Allie Calabrese  [id number]  78 y.o.  1950  Surgeon: Grayson Velazquez  Date of Surgery: 11/15/2017  Procedure: Total Right Knee Arthroplasty  Primary Care Physician: Kristen Mcneil [phone]  Specialists:                                  Pt instructed in the use of Incentive Spirometry. Pt instructed to bring Incentive Spirometer back on date of surgery & to start using Is upon return to pt room. Pt taught proper cough technique      History of smoking:   NONE      Quit date:    Secondhand smoke:PARENTS      Past procedures with Oxygen desaturation:NONE    Past Medical History:   Diagnosis Date    Arthritis     BMI 38.0-38.9,adult     Chronic pain     arthritis    GERD (gastroesophageal reflux disease)     daily med    Insomnia     Liver disease     \" infectious hepatitis as a child for 2 weeks \"                                                                                                                                                      Respiratory history:                                 SOB  ON EXERTION                                     Respiratory meds:                                         FAMILY PRESENT:               NO                                        PAST SLEEP STUDY:           NO  HX OF AMY:                            NO                                     AMY assessment:                                               SLEEPS ON SIDE &  BACK                                                     PHYSICAL EXAM   Body mass index is 38.35 kg/(m^2).    Visit Vitals    BP (!) 153/95 (BP 1 Location: Right arm, BP Patient Position: At rest;Sitting)    Pulse 71    Temp 96.5 °F (35.8 °C)    Ht 5' 5.5\" (1.664 m)    Wt 106.1 kg (234 lb)    SpO2 96%    BMI 38.35 kg/m2     Neck circumference:  39.5    cm    Loud snoring:YES    Witnessed apnea or wakening gasping or choking:,DENIES     Awakens with headaches:DENIES    Morning or daytime tiredness/ sleepiness: DENIES  TIRED     Dry mouth or sore throat in morning:  DENIES    Landaverde stage:4    SACS score:6    STOP/BANG:3       Sleepiness Scale:     Sitting and reading 0    Watching TV 0    Sitting inactive in a public place 0    As a passenger in a car for an hour without a break 0    Lying down to rest in the afternoon when circumstances Permits 0    Sitting and talking to someone 0    Sitting quietly after lunch without alcohol 0    In a car, while stopped for a few minutes 0    Total :  0                           CPAP:NA         NONE  Referrals:    Pt. Phone Number:

## 2017-11-06 NOTE — ADVANCED PRACTICE NURSE
Total Joint Surgery Preoperative Chart Review      Patient ID:  Corina Hodges  [id number]  78 y.o.  1950  Surgeon: Dr Megan Dunham  Date of Surgery: 11/15/2017  Procedure: Total Right Knee Arthroplasty  Primary Care Physician: Joe Vitale [phone]  Specialty Physician(s):      Subjective:   Corina Hodges is a 78 y.o. WHITE OR  female who presents for preoperative evaluation for Total Right Knee arthroplasty. This is a preoperative chart review note based on data collected by the nurse at the surgical Pre-Assessment visit. Past Medical History:   Diagnosis Date    Arthritis     BMI 38.0-38.9,adult     Chronic pain     arthritis    GERD (gastroesophageal reflux disease)     daily med    Insomnia     Liver disease     \" infectious hepatitis as a child for 2 weeks \"      Past Surgical History:   Procedure Laterality Date    HX BUNIONECTOMY Right     with hammer toe    HX CARPAL TUNNEL RELEASE Left     HX CARPAL TUNNEL RELEASE Right     HX HEENT      sinus surgery    HX HYSTERECTOMY      with bladder tac and rectocele    HX KNEE ARTHROSCOPY Right     HX LAP CHOLECYSTECTOMY      HX ORTHOPAEDIC Left     wrist joint replacement     HX ORTHOPAEDIC Left     trigger finger    HX ROTATOR CUFF REPAIR Right     HX TONSILLECTOMY  1961     Family History   Problem Relation Age of Onset   Brooke Moran Cancer Mother     Thyroid Disease Mother     Hypertension Mother     Cancer Father       Social History   Substance Use Topics    Smoking status: Never Smoker    Smokeless tobacco: Never Used    Alcohol use No       Prior to Admission medications    Medication Sig Start Date End Date Taking? Authorizing Provider   diclofenac EC (VOLTAREN) 75 mg EC tablet Take 75 mg by mouth two (2) times a day. Indications: OSTEOARTHRITIS   Yes Historical Provider   omeprazole (PRILOSEC) 20 mg capsule Take 20 mg by mouth Daily (before breakfast). Take / use AM day of surgery  per anesthesia protocols.    Indications: GASTROESOPHAGEAL REFLUX   Yes Historical Provider   HYDROcodone-acetaminophen (NORCO) 5-325 mg per tablet Take 1 Tab by mouth every four (4) hours as needed for Pain. Take / use AM day of surgery  per anesthesia protocols if needed. Indications: Pain   Yes Historical Provider   multivitamin (ONE A DAY) tablet Take 1 Tab by mouth daily. Yes Historical Provider   VITAMIN E, DL,TOCOPHERYL ACET, (VITAMIN E ACETATE) 400 unit cap capsule Take 400 Units by mouth every other day. Yes Historical Provider   magnesium oxide (MAG-OX) 400 mg tablet Take 400 mg by mouth daily. Yes Historical Provider   calcium 500 mg tab Take 1 Tab by mouth every other day. Yes Historical Provider   b complex vitamins (B COMPLEX 1) tablet Take 1 Tab by mouth every other day. Yes Historical Provider   ascorbic acid (VITAMIN C) 500 mg tablet Take 500 mg by mouth daily.    Yes Historical Provider     Allergies   Allergen Reactions    Sulfa (Sulfonamide Antibiotics) Rash     \"topical only\"    Trazodone Other (comments)     Caused restless legs     Ultram [Tramadol] Other (comments)     \"Dizzy\"          Objective:     Physical Exam:   Patient Vitals for the past 24 hrs:   Temp Pulse BP SpO2   11/06/17 1023 96.5 °F (35.8 °C) 71 (!) 153/95 96 %       ECG:    EKG Results     Procedure 720 Value Units Date/Time    EKG, 12 LEAD, INITIAL [[id number]] Collected:  11/06/17 1055    Order Status:  Completed Updated:  11/06/17 1234     Ventricular Rate 63 BPM      Atrial Rate 63 BPM      P-R Interval 174 ms      QRS Duration 84 ms      Q-T Interval 418 ms      QTC Calculation (Bezet) 427 ms      Calculated P Axis 53 degrees      Calculated R Axis 8 degrees      Calculated T Axis 44 degrees      Diagnosis --     Normal sinus rhythm  Normal ECG  When compared with ECG of 17-MAY-2005 15:12,  No significant change was found  Confirmed by CASSIE REEVES (), Jasiel Juarez ([id number]) on 11/6/2017 12:34:33 PM            Data Review:   Labs:   Recent Results (from the past 24 hour(s))   CBC WITH AUTOMATED DIFF    Collection Time: 11/06/17  9:08 AM   Result Value Ref Range    WBC 4.0 (L) 4.3 - 11.1 K/uL    RBC 5.12 4.05 - 5.25 M/uL    HGB 14.5 11.7 - 15.4 g/dL    HCT 43.6 35.8 - 46.3 %    MCV 85.2 79.6 - 97.8 FL    MCH 28.3 26.1 - 32.9 PG    MCHC 33.3 31.4 - 35.0 g/dL    RDW 14.4 11.9 - 14.6 %    PLATELET 602 230 - 264 K/uL    MPV 9.6 (L) 10.8 - 14.1 FL    DF AUTOMATED      NEUTROPHILS 58 43 - 78 %    LYMPHOCYTES 31 13 - 44 %    MONOCYTES 7 4.0 - 12.0 %    EOSINOPHILS 3 0.5 - 7.8 %    BASOPHILS 1 0.0 - 2.0 %    IMMATURE GRANULOCYTES 0 0.0 - 5.0 %    ABS. NEUTROPHILS 2.3 1.7 - 8.2 K/UL    ABS. LYMPHOCYTES 1.2 0.5 - 4.6 K/UL    ABS. MONOCYTES 0.3 0.1 - 1.3 K/UL    ABS. EOSINOPHILS 0.1 0.0 - 0.8 K/UL    ABS. BASOPHILS 0.0 0.0 - 0.2 K/UL    ABS. IMM.  GRANS. 0.0 0.0 - 0.5 K/UL   PROTHROMBIN TIME + INR    Collection Time: 11/06/17  9:08 AM   Result Value Ref Range    Prothrombin time 10.4 9.6 - 12.0 sec    INR 1.0 0.9 - 1.2     PTT    Collection Time: 11/06/17  9:08 AM   Result Value Ref Range    aPTT 27.1 23.5 - 02.1 SEC   METABOLIC PANEL, BASIC    Collection Time: 11/06/17  9:08 AM   Result Value Ref Range    Sodium 145 136 - 145 mmol/L    Potassium 4.0 3.5 - 5.1 mmol/L    Chloride 107 98 - 107 mmol/L    CO2 29 21 - 32 mmol/L    Anion gap 9 7 - 16 mmol/L    Glucose 94 65 - 100 mg/dL    BUN 14 8 - 23 MG/DL    Creatinine 0.69 0.6 - 1.0 MG/DL    GFR est AA >60 >60 ml/min/1.73m2    GFR est non-AA >60 >60 ml/min/1.73m2    Calcium 9.1 8.3 - 10.4 MG/DL   URINALYSIS W/ RFLX MICROSCOPIC    Collection Time: 11/06/17  9:08 AM   Result Value Ref Range    Color YELLOW      Appearance CLEAR      Specific gravity 1.011 1.001 - 1.023      pH (UA) 7.0 5.0 - 9.0      Protein NEGATIVE  NEG mg/dL    Glucose NEGATIVE  mg/dL    Ketone NEGATIVE  NEG mg/dL    Bilirubin NEGATIVE  NEG      Blood NEGATIVE  NEG      Urobilinogen 0.2 0.2 - 1.0 EU/dL    Nitrites NEGATIVE  NEG      Leukocyte Esterase NEGATIVE  NEG ALBUMIN    Collection Time: 11/06/17  9:08 AM   Result Value Ref Range    Albumin 3.8 3.2 - 4.6 g/dL   HEMOGLOBIN A1C WITH EAG    Collection Time: 11/06/17  9:08 AM   Result Value Ref Range    Hemoglobin A1c 5.7 4.8 - 6.0 %    Est. average glucose 117 mg/dL   MSSA/MRSA SC BY PCR, NASAL SWAB    Collection Time: 11/06/17 10:50 AM   Result Value Ref Range    Special Requests: NASAL      Culture result:        SA target not detected. A MRSA NEGATIVE, SA NEGATIVE test result does not preclude MRSA or SA nasal colonization. EKG, 12 LEAD, INITIAL    Collection Time: 11/06/17 10:55 AM   Result Value Ref Range    Ventricular Rate 63 BPM    Atrial Rate 63 BPM    P-R Interval 174 ms    QRS Duration 84 ms    Q-T Interval 418 ms    QTC Calculation (Bezet) 427 ms    Calculated P Axis 53 degrees    Calculated R Axis 8 degrees    Calculated T Axis 44 degrees    Diagnosis       Normal sinus rhythm  Normal ECG  When compared with ECG of 17-MAY-2005 15:12,  No significant change was found  Confirmed by CASSIE REEVES (), Maricarmen Blossom ([id number]) on 11/6/2017 12:34:33 PM           Problem List:  )  Patient Active Problem List   Diagnosis Code    Superior glenoid labrum lesion S43.439A    Supraspinatus (muscle) (tendon) sprain S46.819A    Infraspinatus (muscle) (tendon) sprain S46.919A    Bicipital tenosynovitis M75.20    Primary localized osteoarthrosis, shoulder region M19.019       Total Joint Surgery Pre-Assessment Recommendations:           Low risk assessment based on historical assessment. No further recommendations prior to scheduled surgery.         Signed By: LEXI Cardenas    November 6, 2017

## 2017-11-07 LAB
COTININE UR QL SCN: NEGATIVE NG/ML
DRUG SCREEN COMMENT:, 753798: NORMAL

## 2017-11-07 NOTE — PERIOP NOTES
Nicotine level- neg    11/7/2017  8:40 AM - Jitendra, [facility] [employer]   Component Results   Component Value Flag Ref Range Units Status   Cotinine Screen, urine NEGATIVE   Atopgx=945 ng/mL Final   Drug Screen Comment: Comment

## 2017-11-14 ENCOUNTER — ANESTHESIA EVENT (OUTPATIENT)
Dept: SURGERY | Age: 67
DRG: 470 | End: 2017-11-14
Payer: MEDICARE

## 2017-11-15 ENCOUNTER — HOME HEALTH ADMISSION (OUTPATIENT)
Dept: HOME HEALTH SERVICES | Facility: HOME HEALTH | Age: 67
End: 2017-11-15
Payer: MEDICARE

## 2017-11-15 ENCOUNTER — HOSPITAL ENCOUNTER (INPATIENT)
Age: 67
LOS: 2 days | Discharge: HOME HEALTH | DRG: 470 | End: 2017-11-17
Attending: ORTHOPAEDIC SURGERY | Admitting: ORTHOPAEDIC SURGERY
Payer: MEDICARE

## 2017-11-15 ENCOUNTER — ANESTHESIA (OUTPATIENT)
Dept: SURGERY | Age: 67
DRG: 470 | End: 2017-11-15
Payer: MEDICARE

## 2017-11-15 PROBLEM — M17.9 OA (OSTEOARTHRITIS) OF KNEE: Status: ACTIVE | Noted: 2017-11-15

## 2017-11-15 LAB
ABO + RH BLD: NORMAL
BLOOD GROUP ANTIBODIES SERPL: NORMAL
GLUCOSE BLD STRIP.AUTO-MCNC: 106 MG/DL (ref 65–100)
HGB BLD-MCNC: 11.8 G/DL (ref 11.7–15.4)
SPECIMEN EXP DATE BLD: NORMAL

## 2017-11-15 PROCEDURE — 77030012890

## 2017-11-15 PROCEDURE — 77010033678 HC OXYGEN DAILY

## 2017-11-15 PROCEDURE — 77030012935 HC DRSG AQUACEL BMS -B: Performed by: ORTHOPAEDIC SURGERY

## 2017-11-15 PROCEDURE — 74011000250 HC RX REV CODE- 250

## 2017-11-15 PROCEDURE — 0SRC0JA REPLACEMENT OF RIGHT KNEE JOINT WITH SYNTHETIC SUBSTITUTE, UNCEMENTED, OPEN APPROACH: ICD-10-PCS | Performed by: ORTHOPAEDIC SURGERY

## 2017-11-15 PROCEDURE — 77030020782 HC GWN BAIR PAWS FLX 3M -B: Performed by: ANESTHESIOLOGY

## 2017-11-15 PROCEDURE — 76210000016 HC OR PH I REC 1 TO 1.5 HR: Performed by: ORTHOPAEDIC SURGERY

## 2017-11-15 PROCEDURE — 76060000035 HC ANESTHESIA 2 TO 2.5 HR: Performed by: ORTHOPAEDIC SURGERY

## 2017-11-15 PROCEDURE — 77030027520 HC SEAL BPLR AQMNTYS MEDT -F: Performed by: ORTHOPAEDIC SURGERY

## 2017-11-15 PROCEDURE — 74011250636 HC RX REV CODE- 250/636: Performed by: ORTHOPAEDIC SURGERY

## 2017-11-15 PROCEDURE — 97165 OT EVAL LOW COMPLEX 30 MIN: CPT

## 2017-11-15 PROCEDURE — 76010010054 HC POST OP PAIN BLOCK: Performed by: ORTHOPAEDIC SURGERY

## 2017-11-15 PROCEDURE — 74011250637 HC RX REV CODE- 250/637: Performed by: ORTHOPAEDIC SURGERY

## 2017-11-15 PROCEDURE — 77030032490 HC SLV COMPR SCD KNE COVD -B

## 2017-11-15 PROCEDURE — 74011250636 HC RX REV CODE- 250/636: Performed by: ANESTHESIOLOGY

## 2017-11-15 PROCEDURE — 77030036688 HC BLNKT CLD THER S2SG -B

## 2017-11-15 PROCEDURE — 77030003602 HC NDL NRV BLK BBMI -B: Performed by: ANESTHESIOLOGY

## 2017-11-15 PROCEDURE — 82962 GLUCOSE BLOOD TEST: CPT

## 2017-11-15 PROCEDURE — 74011250636 HC RX REV CODE- 250/636

## 2017-11-15 PROCEDURE — 77030018836 HC SOL IRR NACL ICUM -A: Performed by: ORTHOPAEDIC SURGERY

## 2017-11-15 PROCEDURE — 94760 N-INVAS EAR/PLS OXIMETRY 1: CPT

## 2017-11-15 PROCEDURE — C1776 JOINT DEVICE (IMPLANTABLE): HCPCS | Performed by: ORTHOPAEDIC SURGERY

## 2017-11-15 PROCEDURE — 74011000250 HC RX REV CODE- 250: Performed by: ORTHOPAEDIC SURGERY

## 2017-11-15 PROCEDURE — 76010000171 HC OR TIME 2 TO 2.5 HR INTENSV-TIER 1: Performed by: ORTHOPAEDIC SURGERY

## 2017-11-15 PROCEDURE — 77030002922 HC SUT FBRWRE ARTH -B: Performed by: ORTHOPAEDIC SURGERY

## 2017-11-15 PROCEDURE — 86900 BLOOD TYPING SEROLOGIC ABO: CPT | Performed by: ORTHOPAEDIC SURGERY

## 2017-11-15 PROCEDURE — 77030034849: Performed by: ORTHOPAEDIC SURGERY

## 2017-11-15 PROCEDURE — 74011000250 HC RX REV CODE- 250: Performed by: ANESTHESIOLOGY

## 2017-11-15 PROCEDURE — 65270000029 HC RM PRIVATE

## 2017-11-15 PROCEDURE — 76942 ECHO GUIDE FOR BIOPSY: CPT | Performed by: ORTHOPAEDIC SURGERY

## 2017-11-15 PROCEDURE — 77030033067 HC SUT PDO STRATFX SPIR J&J -B: Performed by: ORTHOPAEDIC SURGERY

## 2017-11-15 PROCEDURE — 77030035643 HC BLD SAW OSC PRECIS STRY -C: Performed by: ORTHOPAEDIC SURGERY

## 2017-11-15 PROCEDURE — 36415 COLL VENOUS BLD VENIPUNCTURE: CPT | Performed by: ORTHOPAEDIC SURGERY

## 2017-11-15 PROCEDURE — 77030008467 HC STPLR SKN COVD -B: Performed by: ORTHOPAEDIC SURGERY

## 2017-11-15 PROCEDURE — 77030002933 HC SUT MCRYL J&J -A: Performed by: ORTHOPAEDIC SURGERY

## 2017-11-15 PROCEDURE — 97161 PT EVAL LOW COMPLEX 20 MIN: CPT

## 2017-11-15 PROCEDURE — 77030011640 HC PAD GRND REM COVD -A: Performed by: ORTHOPAEDIC SURGERY

## 2017-11-15 PROCEDURE — 77030003666 HC NDL SPINAL BD -A: Performed by: ORTHOPAEDIC SURGERY

## 2017-11-15 PROCEDURE — 77030020143 HC AIRWY LARYN INTUB CGAS -A: Performed by: ANESTHESIOLOGY

## 2017-11-15 PROCEDURE — 85018 HEMOGLOBIN: CPT | Performed by: ORTHOPAEDIC SURGERY

## 2017-11-15 PROCEDURE — 77030013727 HC IRR FAN PULSVC ZIMM -B: Performed by: ORTHOPAEDIC SURGERY

## 2017-11-15 PROCEDURE — 77030006790 HC BLD SAW OSC ZIMM -B: Performed by: ORTHOPAEDIC SURGERY

## 2017-11-15 DEVICE — IMPLANTABLE DEVICE: Type: IMPLANTABLE DEVICE | Site: KNEE | Status: FUNCTIONAL

## 2017-11-15 DEVICE — BASEPLT TIB PC TRITNM SZ 5 -- TRIATHLON: Type: IMPLANTABLE DEVICE | Site: KNEE | Status: FUNCTIONAL

## 2017-11-15 DEVICE — INSERT TIB SZ 5 THK13MM UNIV KNEE POLYETH CNDYL STBL PRI: Type: IMPLANTABLE DEVICE | Site: KNEE | Status: FUNCTIONAL

## 2017-11-15 DEVICE — COMPONENT PAT DIA32MM THK10MM SUPERIOR/INFERIOR KNEE: Type: IMPLANTABLE DEVICE | Site: KNEE | Status: FUNCTIONAL

## 2017-11-15 RX ORDER — SODIUM CHLORIDE 9 MG/ML
INJECTION INTRAMUSCULAR; INTRAVENOUS; SUBCUTANEOUS AS NEEDED
Status: DISCONTINUED | OUTPATIENT
Start: 2017-11-15 | End: 2017-11-15 | Stop reason: HOSPADM

## 2017-11-15 RX ORDER — LANOLIN ALCOHOL/MO/W.PET/CERES
400 CREAM (GRAM) TOPICAL DAILY
Status: DISCONTINUED | OUTPATIENT
Start: 2017-11-16 | End: 2017-11-17 | Stop reason: HOSPADM

## 2017-11-15 RX ORDER — NEOMYCIN AND POLYMYXIN B SULFATES 40; 200000 MG/ML; [USP'U]/ML
SOLUTION IRRIGATION AS NEEDED
Status: DISCONTINUED | OUTPATIENT
Start: 2017-11-15 | End: 2017-11-15 | Stop reason: HOSPADM

## 2017-11-15 RX ORDER — ASPIRIN 325 MG
325 TABLET, DELAYED RELEASE (ENTERIC COATED) ORAL EVERY 12 HOURS
Status: DISCONTINUED | OUTPATIENT
Start: 2017-11-15 | End: 2017-11-17 | Stop reason: HOSPADM

## 2017-11-15 RX ORDER — CEFAZOLIN SODIUM IN 0.9 % NACL 2 G/50 ML
2 INTRAVENOUS SOLUTION, PIGGYBACK (ML) INTRAVENOUS ONCE
Status: COMPLETED | OUTPATIENT
Start: 2017-11-15 | End: 2017-11-15

## 2017-11-15 RX ORDER — CELECOXIB 200 MG/1
200 CAPSULE ORAL EVERY 12 HOURS
Qty: 60 CAP | Refills: 2 | Status: SHIPPED | OUTPATIENT
Start: 2017-11-15 | End: 2018-02-13

## 2017-11-15 RX ORDER — ASCORBIC ACID 500 MG
500 TABLET ORAL DAILY
Status: DISCONTINUED | OUTPATIENT
Start: 2017-11-16 | End: 2017-11-17 | Stop reason: HOSPADM

## 2017-11-15 RX ORDER — AMOXICILLIN 250 MG
2 CAPSULE ORAL DAILY
Status: DISCONTINUED | OUTPATIENT
Start: 2017-11-16 | End: 2017-11-17 | Stop reason: HOSPADM

## 2017-11-15 RX ORDER — OXYCODONE HCL 10 MG/1
10 TABLET, FILM COATED, EXTENDED RELEASE ORAL EVERY 12 HOURS
Status: DISCONTINUED | OUTPATIENT
Start: 2017-11-15 | End: 2017-11-17 | Stop reason: HOSPADM

## 2017-11-15 RX ORDER — DIPHENHYDRAMINE HCL 25 MG
25 CAPSULE ORAL
Status: DISCONTINUED | OUTPATIENT
Start: 2017-11-15 | End: 2017-11-17 | Stop reason: HOSPADM

## 2017-11-15 RX ORDER — HYDROMORPHONE HYDROCHLORIDE 2 MG/ML
0.5 INJECTION, SOLUTION INTRAMUSCULAR; INTRAVENOUS; SUBCUTANEOUS
Status: COMPLETED | OUTPATIENT
Start: 2017-11-15 | End: 2017-11-15

## 2017-11-15 RX ORDER — NALOXONE HYDROCHLORIDE 0.4 MG/ML
0.1 INJECTION, SOLUTION INTRAMUSCULAR; INTRAVENOUS; SUBCUTANEOUS AS NEEDED
Status: DISCONTINUED | OUTPATIENT
Start: 2017-11-15 | End: 2017-11-15 | Stop reason: HOSPADM

## 2017-11-15 RX ORDER — SODIUM CHLORIDE 0.9 % (FLUSH) 0.9 %
5-10 SYRINGE (ML) INJECTION AS NEEDED
Status: DISCONTINUED | OUTPATIENT
Start: 2017-11-15 | End: 2017-11-17 | Stop reason: HOSPADM

## 2017-11-15 RX ORDER — SODIUM CHLORIDE 0.9 % (FLUSH) 0.9 %
5-10 SYRINGE (ML) INJECTION EVERY 8 HOURS
Status: DISCONTINUED | OUTPATIENT
Start: 2017-11-15 | End: 2017-11-15 | Stop reason: HOSPADM

## 2017-11-15 RX ORDER — ONDANSETRON 8 MG/1
8 TABLET, ORALLY DISINTEGRATING ORAL
Qty: 60 TAB | Refills: 0 | Status: SHIPPED | OUTPATIENT
Start: 2017-11-15

## 2017-11-15 RX ORDER — CYCLOBENZAPRINE HCL 10 MG
5 TABLET ORAL 3 TIMES DAILY
Qty: 60 TAB | Refills: 0 | Status: SHIPPED | OUTPATIENT
Start: 2017-11-15

## 2017-11-15 RX ORDER — ASPIRIN 325 MG
325 TABLET, DELAYED RELEASE (ENTERIC COATED) ORAL EVERY 12 HOURS
Qty: 60 TAB | Refills: 0 | Status: SHIPPED | OUTPATIENT
Start: 2017-11-15

## 2017-11-15 RX ORDER — DEXAMETHASONE SODIUM PHOSPHATE 4 MG/ML
INJECTION, SOLUTION INTRA-ARTICULAR; INTRALESIONAL; INTRAMUSCULAR; INTRAVENOUS; SOFT TISSUE AS NEEDED
Status: DISCONTINUED | OUTPATIENT
Start: 2017-11-15 | End: 2017-11-15 | Stop reason: HOSPADM

## 2017-11-15 RX ORDER — CEFAZOLIN SODIUM IN 0.9 % NACL 2 G/50 ML
2 INTRAVENOUS SOLUTION, PIGGYBACK (ML) INTRAVENOUS ONCE
Status: DISCONTINUED | OUTPATIENT
Start: 2017-11-15 | End: 2017-11-15 | Stop reason: SDUPTHER

## 2017-11-15 RX ORDER — ONDANSETRON 8 MG/1
8 TABLET, ORALLY DISINTEGRATING ORAL
Status: DISCONTINUED | OUTPATIENT
Start: 2017-11-15 | End: 2017-11-17 | Stop reason: HOSPADM

## 2017-11-15 RX ORDER — KETOROLAC TROMETHAMINE 30 MG/ML
INJECTION, SOLUTION INTRAMUSCULAR; INTRAVENOUS AS NEEDED
Status: DISCONTINUED | OUTPATIENT
Start: 2017-11-15 | End: 2017-11-15 | Stop reason: HOSPADM

## 2017-11-15 RX ORDER — MIDAZOLAM HYDROCHLORIDE 1 MG/ML
2 INJECTION, SOLUTION INTRAMUSCULAR; INTRAVENOUS
Status: DISCONTINUED | OUTPATIENT
Start: 2017-11-15 | End: 2017-11-15 | Stop reason: HOSPADM

## 2017-11-15 RX ORDER — ACETAMINOPHEN 500 MG
1000 TABLET ORAL EVERY 6 HOURS
Status: DISCONTINUED | OUTPATIENT
Start: 2017-11-16 | End: 2017-11-17 | Stop reason: HOSPADM

## 2017-11-15 RX ORDER — ACETAMINOPHEN 10 MG/ML
1000 INJECTION, SOLUTION INTRAVENOUS ONCE
Status: COMPLETED | OUTPATIENT
Start: 2017-11-15 | End: 2017-11-15

## 2017-11-15 RX ORDER — GABAPENTIN 100 MG/1
100 CAPSULE ORAL 2 TIMES DAILY
Qty: 60 CAP | Refills: 0 | Status: SHIPPED | OUTPATIENT
Start: 2017-11-15

## 2017-11-15 RX ORDER — ROPIVACAINE HYDROCHLORIDE 2 MG/ML
INJECTION, SOLUTION EPIDURAL; INFILTRATION; PERINEURAL AS NEEDED
Status: DISCONTINUED | OUTPATIENT
Start: 2017-11-15 | End: 2017-11-15 | Stop reason: HOSPADM

## 2017-11-15 RX ORDER — OXYCODONE HYDROCHLORIDE 5 MG/1
5 TABLET ORAL
Status: DISCONTINUED | OUTPATIENT
Start: 2017-11-15 | End: 2017-11-15 | Stop reason: HOSPADM

## 2017-11-15 RX ORDER — MORPHINE SULFATE 10 MG/ML
INJECTION, SOLUTION INTRAMUSCULAR; INTRAVENOUS AS NEEDED
Status: DISCONTINUED | OUTPATIENT
Start: 2017-11-15 | End: 2017-11-15 | Stop reason: HOSPADM

## 2017-11-15 RX ORDER — SODIUM CHLORIDE 0.9 % (FLUSH) 0.9 %
5-10 SYRINGE (ML) INJECTION AS NEEDED
Status: DISCONTINUED | OUTPATIENT
Start: 2017-11-15 | End: 2017-11-15 | Stop reason: HOSPADM

## 2017-11-15 RX ORDER — SODIUM CHLORIDE 0.9 % (FLUSH) 0.9 %
5-10 SYRINGE (ML) INJECTION EVERY 8 HOURS
Status: DISCONTINUED | OUTPATIENT
Start: 2017-11-15 | End: 2017-11-17 | Stop reason: HOSPADM

## 2017-11-15 RX ORDER — ACETAMINOPHEN 500 MG
1000 TABLET ORAL EVERY 6 HOURS
Qty: 120 TAB | Refills: 0 | Status: SHIPPED | OUTPATIENT
Start: 2017-11-16

## 2017-11-15 RX ORDER — HYDROMORPHONE HYDROCHLORIDE 2 MG/1
2 TABLET ORAL
Qty: 90 TAB | Refills: 0 | Status: SHIPPED | OUTPATIENT
Start: 2017-11-15

## 2017-11-15 RX ORDER — SODIUM CHLORIDE, SODIUM LACTATE, POTASSIUM CHLORIDE, CALCIUM CHLORIDE 600; 310; 30; 20 MG/100ML; MG/100ML; MG/100ML; MG/100ML
100 INJECTION, SOLUTION INTRAVENOUS CONTINUOUS
Status: DISCONTINUED | OUTPATIENT
Start: 2017-11-15 | End: 2017-11-15 | Stop reason: HOSPADM

## 2017-11-15 RX ORDER — ZOLPIDEM TARTRATE 5 MG/1
5 TABLET ORAL
Status: DISCONTINUED | OUTPATIENT
Start: 2017-11-15 | End: 2017-11-17 | Stop reason: HOSPADM

## 2017-11-15 RX ORDER — SODIUM CHLORIDE 9 MG/ML
100 INJECTION, SOLUTION INTRAVENOUS CONTINUOUS
Status: DISPENSED | OUTPATIENT
Start: 2017-11-15 | End: 2017-11-17

## 2017-11-15 RX ORDER — CYCLOBENZAPRINE HCL 10 MG
5 TABLET ORAL 3 TIMES DAILY
Status: DISCONTINUED | OUTPATIENT
Start: 2017-11-15 | End: 2017-11-17 | Stop reason: HOSPADM

## 2017-11-15 RX ORDER — FAMOTIDINE 20 MG/1
20 TABLET, FILM COATED ORAL ONCE
Status: DISCONTINUED | OUTPATIENT
Start: 2017-11-15 | End: 2017-11-15 | Stop reason: HOSPADM

## 2017-11-15 RX ORDER — ROPIVACAINE HYDROCHLORIDE 5 MG/ML
INJECTION, SOLUTION EPIDURAL; INFILTRATION; PERINEURAL AS NEEDED
Status: DISCONTINUED | OUTPATIENT
Start: 2017-11-15 | End: 2017-11-15 | Stop reason: HOSPADM

## 2017-11-15 RX ORDER — CELECOXIB 200 MG/1
200 CAPSULE ORAL EVERY 12 HOURS
Status: DISCONTINUED | OUTPATIENT
Start: 2017-11-15 | End: 2017-11-17 | Stop reason: HOSPADM

## 2017-11-15 RX ORDER — LIDOCAINE HYDROCHLORIDE 10 MG/ML
0.1 INJECTION INFILTRATION; PERINEURAL AS NEEDED
Status: DISCONTINUED | OUTPATIENT
Start: 2017-11-15 | End: 2017-11-15 | Stop reason: HOSPADM

## 2017-11-15 RX ORDER — SODIUM CHLORIDE 9 MG/ML
25 INJECTION, SOLUTION INTRAVENOUS CONTINUOUS
Status: DISCONTINUED | OUTPATIENT
Start: 2017-11-15 | End: 2017-11-15 | Stop reason: HOSPADM

## 2017-11-15 RX ORDER — TRANEXAMIC ACID 650 1/1
1300 TABLET ORAL DAILY
Status: COMPLETED | OUTPATIENT
Start: 2017-11-16 | End: 2017-11-17

## 2017-11-15 RX ORDER — DEXAMETHASONE SODIUM PHOSPHATE 100 MG/10ML
10 INJECTION INTRAMUSCULAR; INTRAVENOUS ONCE
Status: ACTIVE | OUTPATIENT
Start: 2017-11-16 | End: 2017-11-17

## 2017-11-15 RX ORDER — HYDROMORPHONE HYDROCHLORIDE 1 MG/ML
1 INJECTION, SOLUTION INTRAMUSCULAR; INTRAVENOUS; SUBCUTANEOUS
Status: DISCONTINUED | OUTPATIENT
Start: 2017-11-15 | End: 2017-11-17 | Stop reason: HOSPADM

## 2017-11-15 RX ORDER — NALOXONE HYDROCHLORIDE 0.4 MG/ML
.2-.4 INJECTION, SOLUTION INTRAMUSCULAR; INTRAVENOUS; SUBCUTANEOUS
Status: DISCONTINUED | OUTPATIENT
Start: 2017-11-15 | End: 2017-11-17 | Stop reason: HOSPADM

## 2017-11-15 RX ORDER — HYDROMORPHONE HYDROCHLORIDE 2 MG/1
2 TABLET ORAL
Status: DISCONTINUED | OUTPATIENT
Start: 2017-11-15 | End: 2017-11-17 | Stop reason: HOSPADM

## 2017-11-15 RX ORDER — CEFAZOLIN SODIUM IN 0.9 % NACL 2 G/50 ML
2 INTRAVENOUS SOLUTION, PIGGYBACK (ML) INTRAVENOUS EVERY 8 HOURS
Status: COMPLETED | OUTPATIENT
Start: 2017-11-15 | End: 2017-11-16

## 2017-11-15 RX ORDER — ZOLPIDEM TARTRATE 5 MG/1
5 TABLET ORAL
Qty: 60 TAB | Refills: 0 | Status: SHIPPED | OUTPATIENT
Start: 2017-11-15

## 2017-11-15 RX ORDER — FENTANYL CITRATE 50 UG/ML
INJECTION, SOLUTION INTRAMUSCULAR; INTRAVENOUS AS NEEDED
Status: DISCONTINUED | OUTPATIENT
Start: 2017-11-15 | End: 2017-11-15 | Stop reason: HOSPADM

## 2017-11-15 RX ORDER — TRANEXAMIC ACID 100 MG/ML
INJECTION, SOLUTION INTRAVENOUS AS NEEDED
Status: DISCONTINUED | OUTPATIENT
Start: 2017-11-15 | End: 2017-11-15 | Stop reason: HOSPADM

## 2017-11-15 RX ORDER — AMOXICILLIN 250 MG
2 CAPSULE ORAL DAILY
Qty: 120 TAB | Refills: 0 | Status: SHIPPED | OUTPATIENT
Start: 2017-11-16

## 2017-11-15 RX ORDER — GABAPENTIN 100 MG/1
100 CAPSULE ORAL 2 TIMES DAILY
Status: DISCONTINUED | OUTPATIENT
Start: 2017-11-15 | End: 2017-11-17 | Stop reason: HOSPADM

## 2017-11-15 RX ORDER — HYDROCODONE BITARTRATE AND ACETAMINOPHEN 7.5; 325 MG/1; MG/1
1 TABLET ORAL AS NEEDED
Status: DISCONTINUED | OUTPATIENT
Start: 2017-11-15 | End: 2017-11-15 | Stop reason: HOSPADM

## 2017-11-15 RX ORDER — FENTANYL CITRATE 50 UG/ML
100 INJECTION, SOLUTION INTRAMUSCULAR; INTRAVENOUS ONCE
Status: COMPLETED | OUTPATIENT
Start: 2017-11-15 | End: 2017-11-15

## 2017-11-15 RX ADMIN — FENTANYL CITRATE 25 MCG: 50 INJECTION, SOLUTION INTRAMUSCULAR; INTRAVENOUS at 11:14

## 2017-11-15 RX ADMIN — FENTANYL CITRATE 50 MCG: 50 INJECTION, SOLUTION INTRAMUSCULAR; INTRAVENOUS at 10:24

## 2017-11-15 RX ADMIN — FENTANYL CITRATE 25 MCG: 50 INJECTION, SOLUTION INTRAMUSCULAR; INTRAVENOUS at 10:39

## 2017-11-15 RX ADMIN — FENTANYL CITRATE 100 MCG: 50 INJECTION INTRAMUSCULAR; INTRAVENOUS at 08:44

## 2017-11-15 RX ADMIN — TRANEXAMIC ACID 1000 MG: 100 INJECTION, SOLUTION INTRAVENOUS at 09:48

## 2017-11-15 RX ADMIN — TRANEXAMIC ACID 1000 MG: 100 INJECTION, SOLUTION INTRAVENOUS at 11:02

## 2017-11-15 RX ADMIN — CEFAZOLIN 2 G: 1 INJECTION, POWDER, FOR SOLUTION INTRAMUSCULAR; INTRAVENOUS; PARENTERAL at 16:00

## 2017-11-15 RX ADMIN — SODIUM CHLORIDE, SODIUM LACTATE, POTASSIUM CHLORIDE, AND CALCIUM CHLORIDE: 600; 310; 30; 20 INJECTION, SOLUTION INTRAVENOUS at 11:02

## 2017-11-15 RX ADMIN — MIDAZOLAM HYDROCHLORIDE 2 MG: 1 INJECTION, SOLUTION INTRAMUSCULAR; INTRAVENOUS at 08:44

## 2017-11-15 RX ADMIN — FENTANYL CITRATE 25 MCG: 50 INJECTION, SOLUTION INTRAMUSCULAR; INTRAVENOUS at 10:11

## 2017-11-15 RX ADMIN — SODIUM CHLORIDE, SODIUM LACTATE, POTASSIUM CHLORIDE, AND CALCIUM CHLORIDE 100 ML/HR: 600; 310; 30; 20 INJECTION, SOLUTION INTRAVENOUS at 07:01

## 2017-11-15 RX ADMIN — LIDOCAINE HYDROCHLORIDE 0.1 ML: 10 INJECTION, SOLUTION INFILTRATION; PERINEURAL at 07:01

## 2017-11-15 RX ADMIN — CYCLOBENZAPRINE HYDROCHLORIDE 5 MG: 10 TABLET, FILM COATED ORAL at 15:34

## 2017-11-15 RX ADMIN — HYDROMORPHONE HYDROCHLORIDE 2 MG: 2 TABLET ORAL at 23:45

## 2017-11-15 RX ADMIN — FENTANYL CITRATE 25 MCG: 50 INJECTION, SOLUTION INTRAMUSCULAR; INTRAVENOUS at 10:22

## 2017-11-15 RX ADMIN — HYDROMORPHONE HYDROCHLORIDE 0.5 MG: 2 INJECTION, SOLUTION INTRAMUSCULAR; INTRAVENOUS; SUBCUTANEOUS at 12:23

## 2017-11-15 RX ADMIN — FENTANYL CITRATE 25 MCG: 50 INJECTION, SOLUTION INTRAMUSCULAR; INTRAVENOUS at 10:50

## 2017-11-15 RX ADMIN — HYDROMORPHONE HYDROCHLORIDE 0.5 MG: 2 INJECTION, SOLUTION INTRAMUSCULAR; INTRAVENOUS; SUBCUTANEOUS at 12:17

## 2017-11-15 RX ADMIN — ACETAMINOPHEN 1000 MG: 500 TABLET, FILM COATED ORAL at 23:45

## 2017-11-15 RX ADMIN — HYDROMORPHONE HYDROCHLORIDE 0.5 MG: 2 INJECTION, SOLUTION INTRAMUSCULAR; INTRAVENOUS; SUBCUTANEOUS at 12:07

## 2017-11-15 RX ADMIN — GABAPENTIN 100 MG: 100 CAPSULE ORAL at 17:53

## 2017-11-15 RX ADMIN — CELECOXIB 200 MG: 200 CAPSULE ORAL at 20:59

## 2017-11-15 RX ADMIN — FENTANYL CITRATE 25 MCG: 50 INJECTION, SOLUTION INTRAMUSCULAR; INTRAVENOUS at 11:05

## 2017-11-15 RX ADMIN — CEFAZOLIN 2 G: 1 INJECTION, POWDER, FOR SOLUTION INTRAMUSCULAR; INTRAVENOUS; PARENTERAL at 09:35

## 2017-11-15 RX ADMIN — HYDROMORPHONE HYDROCHLORIDE 0.5 MG: 2 INJECTION, SOLUTION INTRAMUSCULAR; INTRAVENOUS; SUBCUTANEOUS at 12:12

## 2017-11-15 RX ADMIN — CYCLOBENZAPRINE HYDROCHLORIDE 5 MG: 10 TABLET, FILM COATED ORAL at 20:58

## 2017-11-15 RX ADMIN — OXYCODONE HYDROCHLORIDE 10 MG: 10 TABLET, FILM COATED, EXTENDED RELEASE ORAL at 20:59

## 2017-11-15 RX ADMIN — ACETAMINOPHEN 1000 MG: 10 INJECTION, SOLUTION INTRAVENOUS at 13:03

## 2017-11-15 RX ADMIN — FENTANYL CITRATE 25 MCG: 50 INJECTION, SOLUTION INTRAMUSCULAR; INTRAVENOUS at 10:21

## 2017-11-15 RX ADMIN — ACETAMINOPHEN 1000 MG: 10 INJECTION, SOLUTION INTRAVENOUS at 17:54

## 2017-11-15 RX ADMIN — FENTANYL CITRATE 50 MCG: 50 INJECTION, SOLUTION INTRAMUSCULAR; INTRAVENOUS at 09:44

## 2017-11-15 RX ADMIN — ASPIRIN 325 MG: 325 TABLET, DELAYED RELEASE ORAL at 20:58

## 2017-11-15 RX ADMIN — SODIUM CHLORIDE 100 ML/HR: 900 INJECTION, SOLUTION INTRAVENOUS at 21:00

## 2017-11-15 RX ADMIN — CEFAZOLIN 2 G: 1 INJECTION, POWDER, FOR SOLUTION INTRAMUSCULAR; INTRAVENOUS; PARENTERAL at 23:45

## 2017-11-15 RX ADMIN — HYDROMORPHONE HYDROCHLORIDE 2 MG: 2 TABLET ORAL at 17:53

## 2017-11-15 RX ADMIN — FENTANYL CITRATE 25 MCG: 50 INJECTION, SOLUTION INTRAMUSCULAR; INTRAVENOUS at 09:56

## 2017-11-15 RX ADMIN — HYDROMORPHONE HYDROCHLORIDE 2 MG: 2 TABLET ORAL at 14:05

## 2017-11-15 RX ADMIN — DEXAMETHASONE SODIUM PHOSPHATE 10 MG: 4 INJECTION, SOLUTION INTRA-ARTICULAR; INTRALESIONAL; INTRAMUSCULAR; INTRAVENOUS; SOFT TISSUE at 09:52

## 2017-11-15 RX ADMIN — ROPIVACAINE HYDROCHLORIDE 20 ML: 5 INJECTION, SOLUTION EPIDURAL; INFILTRATION; PERINEURAL at 08:48

## 2017-11-15 NOTE — H&P
[facility]  Pre Operative History and Physical Exam    Patient ID:  Elzbieta Marquez  [id number]  71 y.o.  1950    Today: November 15, 2017          CC:  Right  Knee pain    HPI:   The patient has end stage arthritis of the right knee. The patient was evaluated and examined during a consultation prior to today. The patient complains of knee pain with activities, reports pain as mostly occurring along the joint lines, reports stiffness of the knee with prolonged inactivity, and swelling/pain at the end of the day and after increased physical activity. The pain affects the patients activities of daily living and quality of life. The patient has attempted and exhausted conservative treatment. There have been no changes to the patient's orthopedic condition since the last office visit. Past Medical History:  Past Medical History:   Diagnosis Date    Arthritis     BMI 38.0-38.9,adult     Chronic pain     arthritis    GERD (gastroesophageal reflux disease)     daily med    Insomnia     Liver disease     \" infectious hepatitis as a child for 2 weeks \"       Past Surgical History:  Past Surgical History:   Procedure Laterality Date    HX BUNIONECTOMY Right     with hammer toe    HX CARPAL TUNNEL RELEASE Left     HX CARPAL TUNNEL RELEASE Right     HX HEENT      sinus surgery    HX HYSTERECTOMY      with bladder tac and rectocele    HX KNEE ARTHROSCOPY Right     HX LAP CHOLECYSTECTOMY      HX ORTHOPAEDIC Left     wrist joint replacement     HX ORTHOPAEDIC Left     trigger finger    HX ROTATOR CUFF REPAIR Right     HX TONSILLECTOMY  1961        Medications:     Prior to Admission medications    Medication Sig Start Date End Date Taking? Authorizing Provider   diclofenac EC (VOLTAREN) 75 mg EC tablet Take 75 mg by mouth two (2) times a day. Indications: OSTEOARTHRITIS    Historical Provider   omeprazole (PRILOSEC) 20 mg capsule Take 20 mg by mouth Daily (before breakfast).  Take / use AM day of surgery  per anesthesia protocols. Indications: GASTROESOPHAGEAL REFLUX    Historical Provider   HYDROcodone-acetaminophen (NORCO) 5-325 mg per tablet Take 1 Tab by mouth every four (4) hours as needed for Pain. Take / use AM day of surgery  per anesthesia protocols if needed. Indications: Pain    Historical Provider   multivitamin (ONE A DAY) tablet Take 1 Tab by mouth daily. Historical Provider   VITAMIN E, DL,TOCOPHERYL ACET, (VITAMIN E ACETATE) 400 unit cap capsule Take 400 Units by mouth every other day. Historical Provider   magnesium oxide (MAG-OX) 400 mg tablet Take 400 mg by mouth daily. Historical Provider   calcium 500 mg tab Take 1 Tab by mouth every other day. Historical Provider   b complex vitamins (B COMPLEX 1) tablet Take 1 Tab by mouth every other day. Historical Provider   ascorbic acid (VITAMIN C) 500 mg tablet Take 500 mg by mouth daily. Historical Provider       Family History:     Family History   Problem Relation Age of Onset    Cancer Mother     Thyroid Disease Mother     Hypertension Mother     Cancer Father        Social History:      Social History   Substance Use Topics    Smoking status: Never Smoker    Smokeless tobacco: Never Used    Alcohol use No         Allergies: Allergies   Allergen Reactions    Sulfa (Sulfonamide Antibiotics) Rash     \"topical only\"    Trazodone Other (comments)     Caused restless legs     Ultram [Tramadol] Other (comments)     \"Dizzy\"        Vitals:     Visit Vitals    Ht 5' 5.5\" (1.664 m)    Wt 106.1 kg (234 lb)    BMI 38.35 kg/m2         Objective:         General: No Acute distress                   HEENT: Normocephalic/atramatic                   Lungs:  Breathing non-labored                   Heart:   RRR                    Abdomen: soft       Extremities:  Pain with ROM of the right knee. Trace effusion. Crepitus present. Distally the patient shows no neurologic deficit. Antalgic gait appreciated.      Meds: Current Facility-Administered Medications   Medication Dose Route Frequency    ceFAZolin in 0.9% NS (ANCEF) IVPB soln 2 g  2 g IntraVENous ONCE    lidocaine (XYLOCAINE) 10 mg/mL (1 %) injection 0.1 mL  0.1 mL SubCUTAneous PRN    lactated Ringers infusion  100 mL/hr IntraVENous CONTINUOUS    0.9% sodium chloride infusion  25 mL/hr IntraVENous CONTINUOUS    sodium chloride (NS) flush 5-10 mL  5-10 mL IntraVENous Q8H    sodium chloride (NS) flush 5-10 mL  5-10 mL IntraVENous PRN    famotidine (PEPCID) tablet 20 mg  20 mg Oral ONCE    fentaNYL citrate (PF) injection 100 mcg  100 mcg IntraVENous ONCE    midazolam (VERSED) injection 2 mg  2 mg IntraVENous ONCE PRN    ceFAZolin in 0.9% NS (ANCEF) IVPB soln 2 g  2 g IntraVENous ONCE       Patient Active Problem List   Diagnosis Code    Superior glenoid labrum lesion S43.439A    Supraspinatus (muscle) (tendon) sprain S46.819A    Infraspinatus (muscle) (tendon) sprain S46.919A    Bicipital tenosynovitis M75.20    Primary localized osteoarthrosis, shoulder region M19.019    OA (osteoarthritis) of knee M17.10       Assessment:   1. Arthritis of the Right knee    Plan:   The patient has failed previous conservative treatment for this condition including anti-inflammatories, injections and lifestyle modifications. The necessity for joint replacement is present. Risks, benefits, alternatives and possible complications of right knee arthroplasty have been discussed with the patient including but not limited to potential for infection, bleeding, damage to nerves and/or blood vessels, stiffness of the knee after surgery, knee instability after surgery, patellar maltracking, potential for fracture both intra-op and post-op, polyethylene wear, implant loosening, and risk for revision surgery secondary to but not limited to these discussed risks.  Further, we have discussed potential for venous thrombo-embolism including deep vein thrombosis and pulmonary embolism despite being on prophylaxis. Additionally, we have discussed potential for continued pain after surgery and patient has voiced understanding that I can make no guarantees regarding the pain relief of outcomes after surgery. We have also previously discussed the potential of morbidity and mortality associated with, but not limited to, the actual surgical procedure, anesthesia, prior medical conditions, and/or the administration of medications perioperatively. The patient has been given the opportunity to ask questions all of which have been answered and the patient wishes to proceed. The patient will be admitted the day of surgery for right total knee replacement.         Signed By: Kurt Morales MD  November 15, 2017

## 2017-11-15 NOTE — ANESTHESIA PROCEDURE NOTES
Peripheral Block    Start time: 11/15/2017 8:44 AM  End time: 11/15/2017 8:48 AM  Performed by: Tamiko Chavez  Authorized by: Tamiko Chavez       Pre-procedure: Indications: at surgeon's request and post-op pain management    Preanesthetic Checklist: patient identified, risks and benefits discussed, site marked, timeout performed, anesthesia consent given and patient being monitored    Timeout Time: 08:44          Block Type:   Block Type:   Adductor canal  Laterality:  Right  Monitoring:  Continuous pulse ox, frequent vital sign checks, heart rate, oxygen and responsive to questions  Injection Technique:  Single shot  Procedures: ultrasound guided    Patient Position: supine  Prep: DuraPrep    Location:  Mid thigh  Needle Type:  Stimuplex  Needle Gauge:  20 G  Needle Localization:  Nerve stimulator and ultrasound guidance  Motor Response: minimal motor response >0.4 mA    Medication Injected:  0.5%  bupivacaine  Adds:  Epi 1:400K  Volume (mL):  20    Assessment:  Number of attempts:  1  Injection Assessment:  Incremental injection every 5 mL, local visualized surrounding nerve on ultrasound, negative aspiration for blood, no intravascular symptoms, no paresthesia and ultrasound image on chart  Patient tolerance:  Patient tolerated the procedure well with no immediate complications

## 2017-11-15 NOTE — ANESTHESIA PREPROCEDURE EVALUATION
Anesthetic History               Review of Systems / Medical History  Patient summary reviewed    Pulmonary                   Neuro/Psych              Cardiovascular                  Exercise tolerance: >4 METS     GI/Hepatic/Renal     GERD: well controlled           Endo/Other        Obesity     Other Findings            Physical Exam    Airway  Mallampati: II  TM Distance: > 6 cm  Neck ROM: normal range of motion   Mouth opening: Normal     Cardiovascular  Regular rate and rhythm,  S1 and S2 normal,  no murmur, click, rub, or gallop             Dental  No notable dental hx       Pulmonary  Breath sounds clear to auscultation               Abdominal         Other Findings            Anesthetic Plan    ASA: 2  Anesthesia type: general      Post-op pain plan if not by surgeon: peripheral nerve block single      Anesthetic plan and risks discussed with: Patient      Declines SAB

## 2017-11-15 NOTE — IP AVS SNAPSHOT
Pj Kirk 58 [address] [facility] 
[phone] Patient: Joseph Cochran MRN: [id number] QOR:[birth year] About your hospitalization You were admitted on:  November 15, 2017 [facility] You were discharged on:  November 17, 2017 Why you were hospitalized Your primary diagnosis was:  Not on File Your diagnoses also included:  Oa (Osteoarthritis) Of Knee Things You Need To Do (next 8 weeks) Follow up with Marie Myers MD  
As needed Phone:  [phone] Where:  [address] [facility], [address] [facility] [address] Follow up with Ed Golden MD  
As scheduled by office Phone:  [phone] Where:  [address] [facility] [address] Follow up with [address] Will contact you within 48 hrs Phone:  [phone] Where:  [address] [facility], [address] [facility], [address] [place] [address] Way [address] Discharge Orders None A check yaniv indicates which time of day the medication should be taken. My Medications STOP taking these medications   
 diclofenac EC 75 mg EC tablet Commonly known as:  VOLTAREN  
   
  
 NORCO 5-325 mg per tablet Generic drug:  HYDROcodone-acetaminophen TAKE these medications as instructed Instructions Each Dose to Equal  
 Morning Noon Evening Bedtime  
 acetaminophen 500 mg tablet Commonly known as:  TYLENOL Your next dose is: Today Take 2 Tabs by mouth every six (6) hours. 1000 mg  
    
   
   
  
   
  
 ascorbic acid (vitamin C) 500 mg tablet Commonly known as:  VITAMIN C Your next dose is:  Tomorrow Take 500 mg by mouth daily. 500 mg  
    
  
   
   
   
  
 aspirin delayed-release 325 mg tablet Your next dose is: Today Take 1 Tab by mouth every twelve (12) hours every twelve (12) hours. 325 mg  
    
   
   
  
   
  
 B COMPLEX 1 tablet Generic drug:  b complex vitamins Your next dose is:  Tomorrow Take 1 Tab by mouth every other day. 1 Tab  
    
  
   
   
   
  
 calcium 500 mg Tab Your next dose is:  Tomorrow Take 1 Tab by mouth every other day. 1 Tab  
    
  
   
   
   
  
 celecoxib 200 mg capsule Commonly known as:  CELEBREX Your next dose is: Today Take 1 Cap by mouth every twelve (12) hours every twelve (12) hours for 90 days. 200 mg  
    
   
   
  
   
  
 cyclobenzaprine 10 mg tablet Commonly known as:  FLEXERIL Your next dose is: Today Take 0.5 Tabs by mouth three (3) times daily. 5 mg  
    
   
   
  
   
  
 gabapentin 100 mg capsule Commonly known as:  NEURONTIN Your next dose is: Today Take 1 Cap by mouth two (2) times a day. 100 mg HYDROmorphone 2 mg tablet Commonly known as:  DILAUDID Your next dose is: Today Take 1 Tab by mouth every four (4) hours as needed. Max Daily Amount: 12 mg.  
 2 mg  
    
   
   
  
   
  
 magnesium oxide 400 mg tablet Commonly known as:  MAG-OX Your next dose is:  Tomorrow Take 400 mg by mouth daily. 400 mg  
    
  
   
   
   
  
 multivitamin tablet Commonly known as:  ONE A DAY Your next dose is:  Tomorrow Take 1 Tab by mouth daily. 1 Tab  
    
  
   
   
   
  
 omeprazole 20 mg capsule Commonly known as:  PRILOSEC Your next dose is:  Tomorrow Take 20 mg by mouth Daily (before breakfast). Take / use AM day of surgery  per anesthesia protocols. Indications: GASTROESOPHAGEAL REFLUX  
 20 mg  
    
  
   
   
   
  
 ondansetron 8 mg disintegrating tablet Commonly known as:  ZOFRAN ODT Your next dose is: Today Take 1 Tab by mouth every eight (8) hours as needed for Nausea. 8 mg  
    
   
   
  
   
  
 senna-docusate 8.6-50 mg per tablet Commonly known as:  Ty Ardon Your next dose is:  Tomorrow Take 2 Tabs by mouth daily. 2 Tab  
    
  
   
   
   
  
 vitamin E acetate 400 unit Cap capsule Your next dose is:  DO NOT TAKE X 5 WEEKS!!  
   
 Take 400 Units by mouth every other day. 400 Units  
    
   
   
   
  
 zolpidem 5 mg tablet Commonly known as:  AMBIEN Your next dose is: Today Take 1 Tab by mouth nightly as needed for Sleep. Max Daily Amount: 5 mg.  
 5 mg Where to Get Your Medications Information on where to get these meds will be given to you by the nurse or doctor. ! Ask your nurse or doctor about these medications  
  acetaminophen 500 mg tablet  
 aspirin delayed-release 325 mg tablet  
 celecoxib 200 mg capsule  
 cyclobenzaprine 10 mg tablet  
 gabapentin 100 mg capsule HYDROmorphone 2 mg tablet  
 ondansetron 8 mg disintegrating tablet  
 senna-docusate 8.6-50 mg per tablet  
 zolpidem 5 mg tablet Discharge Instructions [facility] Patient Discharge Instructions Lenny Blankenship / [id number] : 1950 Admitted 11/15/2017 Discharged: 2017 IF YOU HAVE ANY PROBLEMS ONCE YOU ARE AT HOME CALL THE FOLLOWING NUMBERS:  
Main office number: [phone] Take Home Medications · It is important that you take the medication exactly as they are prescribed. · Keep your medication in the bottles provided by the pharmacist and keep a list of the medication names, dosages, and times to be taken in your wallet. · Do not take other medications without consulting your doctor. What to do at [facility] Resume your prehospital diet. If you have excessive nausea or vomitting call your doctor's office Home Physical Therapy is arranged. Use rolling walker when walking. Use Josef Hose stockings until we see you in the office for your follow up appointment with Dr. Emily Aguayo.  
 
Patients who have had a joint replacement should not drive until you are seen for your follow up appointment by Dr. Nyasia Mejia. When to Call - Call if you have a temperature greater then 101 
- Unable to keep food down - Loose control of your bladder or bowel function - Are unable to bear any weight  
- Need a pain medication refill DISCHARGE SUMMARY from Nurse The following personal items collected during your admission are returned to you:  
Dental Appliance: Dental Appliances: None Vision: Visual Aid: None Hearing Aid:   na 
Jewelry: Jewelry: None Clothing: Clothing: Allie Valdo Other Valuables: Other Valuables: Kori Lemons Valuables sent to safe:   na 
 
PATIENT INSTRUCTIONS: 
 
After general anesthesia or intravenous sedation, for 24 hours or while taking prescription Narcotics: · Limit your activities · Do not drive and operate hazardous machinery · Do not make important personal or business decisions · Do  not drink alcoholic beverages · If you have not urinated within 8 hours after discharge, please contact your surgeon on call. Report the following to your surgeon: 
· Excessive pain, swelling, redness or odor of or around the surgical area · Temperature over 101 · Nausea and vomiting lasting longer than 4 hours or if unable to take medications · Any signs of decreased circulation or nerve impairment to extremity: change in color, persistent  numbness, tingling, coldness or increase pain · Any questions, call office @ [phone] Keep scheduled follow up appointment. If need to change, call office @ [phone]. *  Please give a list of your current medications to your Primary Care Provider. *  Please update this list whenever your medications are discontinued, doses are 
    changed, or new medications (including over-the-counter products) are added. *  Please carry medication information at all times in case of emergency situations. Total Knee Replacement: What to Expect at Home Your Recovery When you leave the hospital, you should be able to move around with a walker or crutches. But you will need someone to help you at home for the next few weeks or until you have more energy and can move around better. If there is no one to help you at home, you may go to a rehabilitation center. You will go home with a bandage and stitches or staples. Change the bandage as your doctor tells you to. Your doctor will remove your stitches or staples 10 to 21 days after your surgery. You may still have some mild pain, and the area may be swollen for 3 to 6 months after surgery. Your knee will continue to improve for 6 to 12 months. You will probably use a walker for 1 to 3 weeks and then use crutches. When you are ready, you can use a cane. You will probably be able to walk on your own in 4 to 8 weeks. You will need to do months of physical rehabilitation (rehab) after a knee replacement. Rehab will help you strengthen the muscles of the knee and help you regain movement. After you recover, your artificial knee will allow you to do normal daily activities with less pain or no pain at all. You may be able to hike, dance, ride a bike, and play golf. Talk to your doctor about whether you can do more strenuous activities. Always tell your caregivers that you have an artificial knee. How long it will take to walk on your own, return to normal activities, and go back to work depends on your health and how well your rehabilitation (rehab) program goes. The better you do with your rehab exercises, the quicker you will get your strength and movement back. This care sheet gives you a general idea about how long it will take for you to recover. But each person recovers at a different pace. Follow the steps below to get better as quickly as possible. How can you care for yourself at home? Activity ? · Rest when you feel tired.  You may take a nap, but do not stay in bed all day. When you sit, use a chair with arms. You can use the arms to help you stand up. ? · Work with your physical therapist to find the best way to exercise. You may be able to take frequent, short walks using crutches or a walker. What you can do as your knee heals will depend on whether your new knee is cemented or uncemented. You may not be able to do certain things for a while if your new knee is uncemented. ? · After your knee has healed enough, you can do more strenuous activities with caution. ¨ You can golf, but use a golf cart, and do not wear shoes with spikes. ¨ You can bike on a flat road or on a stationary bike. Avoid biking up hills. ¨ Your doctor may suggest that you stay away from activities that put stress on your knee. These include tennis or badminton, squash or racquetball, contact sports like football, jumping (such as in basketball), jogging, or running. ¨ Avoid activities where you might fall. These include horseback riding, skiing, and mountain biking. ? · Do not sit for more than 1 hour at a time. Get up and walk around for a while before you sit again. If you must sit for a long time, prop up your leg with a chair or footstool. This will help you avoid swelling. ? · Ask your doctor when you can shower. You may need to take sponge baths until your stitches or staples have been removed. ? · Ask your doctor when you can drive again. It may take up to 8 weeks after knee replacement surgery before it is safe for you to drive. ? · When you get into a car, sit on the edge of the seat. Then pull in your legs, and turn to face the front. ? · You should be able to do many everyday activities 3 to 6 weeks after your surgery. You will probably need to take 4 to 16 weeks off from work. When you can go back to work depends on the type of work you do and how you feel. ? · Ask your doctor when it is okay for you to have sex. ? · Do not lift anything heavier than 10 pounds and do not lift weights for 12 weeks. Diet ? · By the time you leave the hospital, you should be eating your normal diet. If your stomach is upset, try bland, low-fat foods like plain rice, broiled chicken, toast, and yogurt. Your doctor may suggest that you take iron and vitamin supplements. ? · Drink plenty of fluids (unless your doctor tells you not to). ? · Eat healthy foods, and watch your portion sizes. Try to stay at your ideal weight. Too much weight puts more stress on your new knee. ? · You may notice that your bowel movements are not regular right after your surgery. This is common. Try to avoid constipation and straining with bowel movements. You may want to take a fiber supplement every day. If you have not had a bowel movement after a couple of days, ask your doctor about taking a mild laxative. Medicines ? · Your doctor will tell you if and when you can restart your medicines. He or she will also give you instructions about taking any new medicines. ? · If you take blood thinners, such as warfarin (Coumadin), clopidogrel (Plavix), or aspirin, be sure to talk to your doctor. He or she will tell you if and when to start taking those medicines again. Make sure that you understand exactly what your doctor wants you to do.  
? · Your doctor may give you a blood-thinning medicine to prevent blood clots. If you take a blood thinner, be sure you get instructions about how to take your medicine safely. Blood thinners can cause serious bleeding problems. This medicine could be in pill form or as a shot (injection). If a shot is necessary, your doctor will tell you how to do this. ? · Be safe with medicines. Take pain medicines exactly as directed. ¨ If the doctor gave you a prescription medicine for pain, take it as prescribed. ¨ If you are not taking a prescription pain medicine, ask your doctor if you can take an over-the-counter medicine. ¨ Plan to take your pain medicine 30 minutes before exercises. It is easier to prevent pain before it starts than to stop it once it has started. ? · If you think your pain medicine is making you sick to your stomach: 
¨ Take your medicine after meals (unless your doctor has told you not to). ¨ Ask your doctor for a different pain medicine. ? · If your doctor prescribed antibiotics, take them as directed. Do not stop taking them just because you feel better. You need to take the full course of antibiotics. Incision care ? · You will have a bandage over the cut (incision) and staples or stitches. Take the bandage off when your doctor says it is okay. ? · Your doctor will remove the staples or stitches 10 days to 3 weeks after the surgery and replace them with strips of tape. Leave the tape on for a week or until it falls off. Exercise ? · Your rehab program will give you a number of exercises to do to help you get back your knee's range of motion and strength. Always do them as your therapist tells you. Ice and elevation ? · For pain and swelling, put ice or a cold pack on the area for 10 to 20 minutes at a time. Put a thin cloth between the ice and your skin. Other instructions ? · Continue to wear your support stockings as your doctor says. These help to prevent blood clots. The length of time that you will have to wear them depends on your activity level and the amount of swelling. ? · You have metal pieces in your knee. These may set off some airport metal detectors. Carry a medical alert card that says you have an artificial joint, just in case. Follow-up care is a key part of your treatment and safety. Be sure to make and go to all appointments, and call your doctor if you are having problems. It's also a good idea to know your test results and keep a list of the medicines you take. When should you call for help? Call 911 anytime you think you may need emergency care.  For example, call if: 
? · You passed out (lost consciousness). ? · You have severe trouble breathing. ? · You have sudden chest pain and shortness of breath, or you cough up blood. ?Call your doctor now or seek immediate medical care if: 
? · You have signs of infection, such as: 
¨ Increased pain, swelling, warmth, or redness. ¨ Red streaks leading from the incision. ¨ Pus draining from the incision. ¨ A fever. ? · You have signs of a blood clot, such as: 
¨ Pain in your calf, back of the knee, thigh, or groin. ¨ Redness and swelling in your leg or groin. ? · Your incision comes open and begins to bleed, or the bleeding increases. ? · You have pain that does not get better after you take pain medicine. ? Watch closely for changes in your health, and be sure to contact your doctor if: 
? · You do not have a bowel movement after taking a laxative. Where can you learn more? Go to http://yanely-barrington.info/. Enter I355 in the search box to learn more about \"Total Knee Replacement: What to Expect at Home. \" Current as of: March 21, 2017 Content Version: 11.4 © 8379-8847 [employer]. Care instructions adapted under license by [employer] (which disclaims liability or warranty for this information). If you have questions about a medical condition or this instruction, always ask your healthcare professional. [place] [address] any warranty or liability for your use of this information. These are general instructions for a healthy lifestyle: No smoking/ No tobacco products/ Avoid exposure to second hand smoke Surgeon General's Warning:  Quitting smoking now greatly reduces serious risk to your health. Obesity, smoking, and sedentary lifestyle greatly increases your risk for illness A healthy diet, regular physical exercise & weight monitoring are important for maintaining a healthy lifestyle You may be retaining fluid if you have a history of heart failure or if you experience any of the following symptoms:  Weight gain of 3 pounds or more overnight or 5 pounds in a week, increased swelling in our hands or feet or shortness of breath while lying flat in bed. Please call your doctor as soon as you notice any of these symptoms; do not wait until your next office visit. Recognize signs and symptoms of STROKE: 
 
F-face looks uneven A-arms unable to move or move even S-speech slurred or non-existent T-time-call 911 as soon as signs and symptoms begin-DO NOT go Back to bed or wait to see if you get better-TIME IS BRAIN. The discharge information has been reviewed with the patient. The patient verbalized understanding. Information obtained by : 
I understand that if any problems occur once I am at home I am to contact my physician. I understand and acknowledge receipt of the instructions indicated above. Physician's or R.N.'s Signature                                                                  Date/Time Patient or Representative Signature                                                          Date/Time Introducing [facility]! [employer] introduces [employer]ex patient portal. Now you can access parts of your medical record, email your doctor's office, and request medication refills online. 1. In your internet browser, go to [email or web address] 2. Click on the First Time User? Click Here link in the Sign In box. You will see the New Member Sign Up page. 3. Enter your Photomedex Access Code exactly as it appears below.  You will not need to use this code after youve completed the sign-up process. If you do not sign up before the expiration date, you must request a new code. · [employer] Access Code: [email or web address] Expires: 1/29/2018 12:25 PM 
 
4. Enter the last four digits of your Social Security Number (xxxx) and Date of Birth (mm/dd/yyyy) as indicated and click Submit. You will be taken to the next sign-up page. 5. Create a [employer] ID. This will be your [employer] login ID and cannot be changed, so think of one that is secure and easy to remember. 6. Create a [employer] password. You can change your password at any time. 7. Enter your Password Reset Question and Answer. This can be used at a later time if you forget your password. 8. Enter your e-mail address. You will receive e-mail notification when new information is available in [address] 9. Click Sign Up. You can now view and download portions of your medical record. 10. Click the Download Summary menu link to download a portable copy of your medical information. If you have questions, please visit the Frequently Asked Questions section of the [employer] website. Remember, [employer] is NOT to be used for urgent needs. For medical emergencies, dial 911. Now available from your iPhone and Android! Providers Seen During Your Hospitalization Provider Specialty Primary office phone Cornelio Whalen MD Orthopedic Surgery [phone] Your Primary Care Physician (PCP) Primary Care Physician Office Phone Office Fax Georgina Joyner [phone] You are allergic to the following Allergen Reactions Sulfa (Sulfonamide Antibiotics) Rash \"topical only\" Trazodone Other (comments) Caused restless legs Ultram (Tramadol) Other (comments) \"Dizzy\" Recent Documentation Height Weight BMI OB Status Smoking Status 1.664 m 106.1 kg 38.35 kg/m2 Hysterectomy Never Smoker Emergency Contacts Name Discharge Info Relation Home Work Mobile Anabelle Mendoza  Child [2] [phone] Patient Belongings The following personal items are in your possession at time of discharge: 
  Dental Appliances: None  Visual Aid: None          Jewelry: None  Clothing: Pants, Shirt    Other Valuables: Heather Iniguez Please provide this summary of care documentation to your next provider. Signatures-by signing, you are acknowledging that this After Visit Summary has been reviewed with you and you have received a copy. Patient Signature:  ____________________________________________________________ Date:  ____________________________________________________________  
  
[facility] Provider Signature:  ____________________________________________________________ Date:  ____________________________________________________________

## 2017-11-15 NOTE — PROGRESS NOTES
Problem: Mobility Impaired (Adult and Pediatric)  Goal: *Acute Goals and Plan of Care (Insert Text)  GOALS (1-4 days):  (1.)Ms. Nury Bhakta will move from supine to sit and sit to supine  in bed with SUPERVISION. (2.)Ms. Nury Bhakta will transfer from bed to chair and chair to bed with STAND BY ASSIST using the least restrictive device. (3.)Ms. Nury Bhakta will ambulate with STAND BY ASSIST for 200 feet with the least restrictive device. (4.)Ms. Nury Bhakta will ambulate up/down 3 steps with no railing with MINIMAL ASSIST with no device. (5.)Ms. Nury Bhakta will increase right knee ROM to 5°-80°.  ________________________________________________________________________________________________    PHYSICAL THERAPY Joint camp tKa: Initial Assessment, Treatment Day: Day of Assessment, PM 11/15/2017  INPATIENT: Hospital Day: 1  [place] / Product Type: Medicare /      NAME/AGE/GENDER: Trisha Guerra is a 78 y.o. female   PRIMARY DIAGNOSIS:  Primary osteoarthritis of right knee [M17.11]   Procedure(s) and Anesthesia Type:     * RIGHT KNEE ARTHROPLASTY TOTAL / RISSA - Spinal (Right)  ICD-10: Treatment Diagnosis:    · Pain in Right Knee (M25.561)  · Stiffness of Right Knee, Not elsewhere classified (M25.661)  · Difficulty in walking, Not elsewhere classified (R26.2)      ASSESSMENT:     Ms. Nury Bhakta presents with impaired strength & mobility s/p right TKA. Pt also showed decreased stability during out of bed activity. Pt will benefit from follow up therapy to help restore safe function prior to returning home with caregiver. This section established at most recent assessment   PROBLEM LIST (Impairments causing functional limitations):  1. Decreased Strength  2. Decreased ADL/Functional Activities  3. Decreased Transfer Abilities  4. Decreased Ambulation Ability/Technique  5. Decreased Balance  6. Increased Pain  7. Decreased Activity Tolerance  8. Decreased Flexibility/Joint Mobility  9.  Decreased [place] with Home Exercise Program   INTERVENTIONS PLANNED: (Benefits and precautions of physical therapy have been discussed with the patient.)  1. Bed Mobility  2. Gait Training  3. Home Exercise Program (HEP)  4. Therapeutic Exercise/Strengthening  5. Transfer Training  6. Range of Motion: active/assisted/passive  7. Therapeutic Activities  8. Group Therapy     TREATMENT PLAN: Frequency/Duration: Follow patient BID   to address above goals. Rehabilitation Potential For Stated Goals: Good     RECOMMENDED REHABILITATION/EQUIPMENT: (at time of discharge pending progress): Continue Skilled Therapy and Home Health: Physical Therapy. HISTORY:   History of Present Injury/Illness (Reason for Referral): The patient has end stage arthritis of the right knee. The patient was evaluated and examined during a consultation prior to today. The patient complains of knee pain with activities, reports pain as mostly occurring along the joint lines, reports stiffness of the knee with prolonged inactivity, and swelling/pain at the end of the day and after increased physical activity. The pain affects the patients activities of daily living and quality of life. The patient has attempted and exhausted conservative treatment. There have been no changes to the patient's orthopedic condition since the last office visit. Past Medical History/Comorbidities:   Ms. Florence Marroquin  has a past medical history of Arthritis; BMI 38.0-38.9,adult; Chronic pain; GERD (gastroesophageal reflux disease); Insomnia; and Liver disease. She also has no past medical history of Adverse effect of anesthesia; Aneurysm ([address] Arrhythmia; Asthma; Autoimmune disease ([address] CAD (coronary artery disease); Cancer ([address] Chronic kidney disease; Chronic obstructive pulmonary disease ([address] Coagulation disorder ([address] Diabetes ([address] Difficult intubation; Endocarditis; Heart failure ([address] Hypertension; Malignant hyperthermia due to anesthesia;  Nausea & vomiting; Nicotine vapor product user; Non-nicotine vapor product user; Other unknown and unspecified cause of morbidity or mortality; Pseudocholinesterase deficiency; Psychiatric disorder; PUD (peptic ulcer disease); Rheumatic fever; Seizures ([facility] Stroke [facility]); [facility]us ([facility] Thyroid disease; or Unspecified sleep apnea. Ms. Angelia Miranda  has a past surgical history that includes heent; tonsillectomy (1961); lap cholecystectomy; carpal tunnel release (Left); carpal tunnel release (Right); orthopaedic (Left); orthopaedic (Left); hysterectomy; knee arthroscopy (Right); bunionectomy (Right); and rotator cuff repair (Right). Social History/Living Environment:   Home Environment: Private residence  # Steps to Enter: 3  Rails to Enter: No  One/Two Story Residence: Two story, live on 1st floor  # of Interior Steps: 13  Interior Rails: Left  Living Alone: Yes  Support Systems: Child(scott)  Patient Expects to be Discharged to[de-identified] Private residence  Current DME Used/Available at Home: Walker, rolling  Tub or Shower Type: Tub/Shower combination  Prior Level of Function/Work/Activity:  Pt was independent without an assistive device prior to this admission   Number of Personal Factors/Comorbidities that affect the Plan of Care: 3+: HIGH COMPLEXITY   EXAMINATION:   Most Recent Physical Functioning:   Gross Assessment: Yes  Gross Assessment  AROM: Within functional limits (left LE)  Strength: Within functional limits (left LE)  Coordination: Within functional limits (left LE)         RLE PROM  R Knee Flexion: 55 (~post op)  R Knee Extension: -15 (~post op)           Bed Mobility  Supine to Sit: Contact guard assistance  Sit to Supine: Contact guard assistance  Scooting: Contact guard assistance    Transfers  Sit to Stand: Minimum assistance  Stand to Sit: Minimum assistance  Bed to Chair: Minimum assistance (with walker)    Balance  Sitting: Intact; Without support  Standing: Impaired; With support (walker)              Weight Bearing Status  Right Side Weight Bearing: As tolerated  Distance (ft): 5 Feet (ft)  Ambulation - Level of Assistance: Minimal assistance  Assistive Device: Walker, rolling  Speed/Francia: Shuffled; Slow  Step Length: Left shortened;Right shortened  Stance: Right decreased  Gait Abnormalities: Antalgic;Decreased step clearance        Braces/Orthotics: none    Right Knee Cold  Type: Cryocuff      Body Structures Involved:  1. Joints  2. Muscles Body Functions Affected:  1. Sensory/Pain  2. Movement Related Activities and Participation Affected:  1. General Tasks and Demands  2. Mobility   Number of elements that affect the Plan of Care: 4+: HIGH COMPLEXITY   CLINICAL PRESENTATION:   Presentation: Stable and uncomplicated: LOW COMPLEXITY   CLINICAL DECISION MAKIN[address] [place] [id number] AM-PAC 6 Clicks   Basic Mobility Inpatient Short Form  How much difficulty does the patient currently have. .. Unable A Lot A Little None   1. Turning over in bed (including adjusting bedclothes, sheets and blankets)? [] 1   [] 2   [x] 3   [] 4   2. Sitting down on and standing up from a chair with arms ( e.g., wheelchair, bedside commode, etc.)   [] 1   [] 2   [x] 3   [] 4   3. Moving from lying on back to sitting on the side of the bed? [] 1   [] 2   [x] 3   [] 4   How much help from another person does the patient currently need. .. Total A Lot A Little None   4. Moving to and from a bed to a chair (including a wheelchair)? [] 1   [] 2   [x] 3   [] 4   5. Need to walk in hospital room? [] 1   [] 2   [x] 3   [] 4   6. Climbing 3-5 steps with a railing? [x] 1   [] 2   [] 3   [] 4   © , Trustees of [address] [place] [address] [address] [address], under license to [employer]. All rights reserved        Score:  Initial: 16 Most Recent: X (Date: -- )    Interpretation of Tool:  Represents activities that are increasingly more difficult (i.e. Bed mobility, Transfers, Gait).    Score 24 23 22-20 19-15 14-10 9-7 6     Modifier CH CI CJ CK CL CM CN ? Mobility - Walking and Moving Around:     - CURRENT STATUS: CK - 40%-59% impaired, limited or restricted    - GOAL STATUS: CJ - 20%-39% impaired, limited or restricted    - D/C STATUS:  ---------------To be determined---------------  [place] / Product Type: Medicare /      Medical Necessity:     · Patient is expected to demonstrate progress in strength, range of motion, balance, coordination and functional technique to decrease assistance required with bed mobility, transfers & gait. Reason for Services/Other Comments:  · Patient continues to require skilled intervention due to pt not safe with functional mobility. Use of outcome tool(s) and clinical judgement create a POC that gives a: Clear prediction of patient's progress: LOW COMPLEXITY            TREATMENT:   (In addition to Assessment/Re-Assessment sessions the following treatments were rendered)     Pre-treatment Symptoms/Complaints:  nausea  Pain: Initial: visual scale  Pain Intensity 1: 3  Pain Location 1: Knee  Pain Orientation 1: Right  Pain Intervention(s) 1: Cold pack, Repositioned  Post Session:  3/10     Assessment/Reassessment only, no treatment provided today       Date:   Date:   Date:     ACTIVITY/EXERCISE AM PM AM PM AM PM   GROUP THERAPY  []  []  []  []  []  []   Ankle Pumps         Quad Sets         Gluteal Sets         Hip ABd/ADduction         Straight Leg Raises         Knee Slides         Short Arc Quads         Long Arc Quads         Chair Slides                  B = bilateral; AA = active assistive; A = active; P = passive      Treatment/Session Assessment:     Response to Treatment:  Tolerated well.     Education:  [] Home Exercises  [x] Fall Precautions  [] Hip Precautions [] D/C Instruction Review  [] Knee/Hip Prosthesis Review  [x] Walker Management/Safety [] Adaptive Equipment as Needed       Interdisciplinary Collaboration:   o Occupational Therapist  o Registered Nurse    After treatment position/precautions:   o Supine in bed  o Bed/Chair-wheels locked  o Bed in low position  o Caregiver at bedside  o Call light within reach  o RN notified  o Family at bedside    Compliance with Program/Exercises: Will assess as treatment progresses. Recommendations/Intent for next treatment session:  Treatment next visit will focus on increasing Ms. Zita Oakes independence with bed mobility, transfers, gait training, strength/ROM exercises, modalities for pain, and patient education.       Total Treatment Duration:  PT Patient Time In/Time Out  Time In: 1520  Time Out: [facility],

## 2017-11-15 NOTE — PROGRESS NOTES
Moving BLE. C/O pain of 10/10 but is dozing off and on. Explained to pt she had received 2 mg of IV  Dilaudid downstairs in recovery so it is too soon to given IV again. Dilaudid 2 mg po given. Pt drinking decaffeinated tea. Mother at bedside. Oriented pt to room, unit, dining on call, IS, and pain management. No signs of distress noted. Pt on 1 liter oxygen via nasal cannula. Lungs clear to auscultation. Rodriguez to drainage with no loops in tubing, and stat lock in place. Yellow/straw color urine noted. IV intact with no redness, or swelling noted infusing. Ice man applied and plexi-pulses on. Call light within reach and bed in low position and locked. Pt informed to call out for needs verbalizes understanding.

## 2017-11-15 NOTE — PROGRESS NOTES
Problem: Self Care Deficits Care Plan (Adult)  Goal: *Acute Goals and Plan of Care (Insert Text)  GOALS:   DISCHARGE GOALS (in preparation for going home/rehab):  3 days  1. Ms. Flaquita Aguirre will perform one lower body dressing activity with minimal assistance required to demonstrate improved functional mobility and safety. 2.  Ms. Flaquita Aguirre will perform one lower body bathing activity with minimal assistance required to demonstrate improved functional mobility and safety. 3.  Ms. Flaquita Aguirre will perform toileting/toilet transfer with contact guard assistance to demonstrate improved functional mobility and safety. 4.  Ms. Flaquita Aguirre will perform shower transfer with contact guard assistance to demonstrate improved functional mobility and safety. JOINT CAMP OCCUPATIONAL THERAPY TKA: Initial Assessment and PM 11/15/2017  INPATIENT: Hospital Day: 1  [place] / Product Type: Medicare /      NAME/AGE/GENDER: Allie Calabrese is a 78 y.o. female   PRIMARY DIAGNOSIS:  Primary osteoarthritis of right knee [M17.11]   Procedure(s) and Anesthesia Type:     * RIGHT KNEE ARTHROPLASTY TOTAL / RISSA - Spinal (Right)  ICD-10: Treatment Diagnosis:    · Pain in Right Knee (M25.561)  · Stiffness of Right Knee, Not elsewhere classified (M25.661)  · Other lack of cordination (R27.8)      ASSESSMENT:     Ms. Flaquita Aguirre is s/p right TKA and presents with decreased weight bearing on right LE and decreased independence with functional mobility and activities of daily living. Patient would benefit from skilled Occupational Therapy to maximize independence and safety with self-care task and functional mobility. Pt would also benefit from education on adaptive equipment and safety precautions in preparation for going home or for recommendations for post-hospital rehab program.  Patient plans for further rehab at home with home health services and good family support .   OT reviewed therapy schedule and plan of care with patient. Patient was able to transfer and preform self care skills as charted below. Patient instructed to call for assistance when needing to get up from the bed and all needs in reach. Patient verbalized understanding of call light. This section established at most recent assessment   PROBLEM LIST (Impairments causing functional limitations):  1. Decreased Strength  2. Decreased ADL/Functional Activities  3. Decreased Transfer Abilities  4. Increased Pain  5. Increased Fatigue  6. Decreased Flexibility/Joint Mobility  7. Decreased Knowledge of Precautions   INTERVENTIONS PLANNED: (Benefits and precautions of occupational therapy have been discussed with the patient.)  1. Activities of daily living training  2. Adaptive equipment training  3. Balance training  4. Clothing management  5. Donning&doffing training  6. Theraputic activity     TREATMENT PLAN: Frequency/Duration: Follow patient 1 time to address above goals. Rehabilitation Potential For Stated Goals: Excellent     RECOMMENDED REHABILITATION/EQUIPMENT: (at time of discharge pending progress): Continue Skilled Therapy and Home Health: Physical Therapy. OCCUPATIONAL PROFILE AND HISTORY:   History of Present Injury/Illness (Reason for Referral): Pt presents this date s/p (right) TKA. Past Medical History/Comorbidities:   Ms. Dane Garcia  has a past medical history of Arthritis; BMI 38.0-38.9,adult; Chronic pain; GERD (gastroesophageal reflux disease); Insomnia; and Liver disease. She also has no past medical history of Adverse effect of anesthesia; Aneurysm ([address] Arrhythmia; Asthma; Autoimmune disease ([address] CAD (coronary artery disease); Cancer ([address] Chronic kidney disease; Chronic obstructive pulmonary disease ([address] Coagulation disorder ([address] Diabetes ([address] Difficult intubation; Endocarditis; Heart failure ([address] Hypertension; Malignant hyperthermia due to anesthesia;  Nausea & vomiting; Nicotine vapor product user; Non-nicotine vapor product user; Other unknown and unspecified cause of morbidity or mortality; Pseudocholinesterase deficiency; Psychiatric disorder; PUD (peptic ulcer disease); Rheumatic fever; Seizures ([facility] Stroke [facility]); Thromboembolus ([facility] Thyroid disease; or Unspecified sleep apnea. Ms. Pako Fitzgerald  has a past surgical history that includes heent; tonsillectomy (1961); lap cholecystectomy; carpal tunnel release (Left); carpal tunnel release (Right); orthopaedic (Left); orthopaedic (Left); hysterectomy; knee arthroscopy (Right); bunionectomy (Right); and rotator cuff repair (Right).   Social History/Living Environment:   Home Environment: Private residence  # Steps to Enter: 3  Rails to Enter: No  One/Two Story Residence: Two story, live on 1st floor  # of Interior Steps: [id number] Floor: Left  Living Alone: Yes  Support Systems: Child(scott)  Patient Expects to be Discharged to[de-identified] Private residence  Current DME Used/Available at Home: Viola Drones, rolling, Cyrilla Crape, straight  Tub or Shower Type: Tub/Shower combination  Prior Level of Function/Work/Activity:  Indep with BADLs and IADLs     Number of Personal Factors/Comorbidities that affect the Plan of Care: Brief history (0):  LOW COMPLEXITY   ASSESSMENT OF OCCUPATIONAL PERFORMANCE[de-identified]   Most Recent Physical Functioning:           Patient Vitals for the past 6 hrs:   BP BP Patient Position SpO2 O2 Flow Rate (L/min) Pulse   11/15/17 1150 137/74 At rest 92 % 3 l/min 91   11/15/17 1155 112/87 - 100 % - 88   11/15/17 1200 149/74 - 100 % - 76   11/15/17 1205 141/76 - 100 % - 75   11/15/17 1210 105/73 - 100 % - 76   11/15/17 1215 146/81 - 100 % - 74   11/15/17 1220 142/77 - 100 % - 74   11/15/17 1225 (!) 143/101 - 100 % - 88   11/15/17 1230 145/88 - 100 % - 86   11/15/17 1235 140/86 - 100 % - 90   11/15/17 1240 106/88 - 100 % - 92   11/15/17 1245 146/81 - 100 % - 92   11/15/17 1246 (!) 151/96 - 100 % - 86   11/15/17 1250 119/85 - 100 % 3 l/min 86   11/15/17 1348 133/82 At rest 94 % - 78 11/15/17 1350 - - 98 % 3 l/min -                    Coordination  Fine Motor Skills-Upper: Left Intact; Right Intact  Gross Motor Skills-Upper: Left Intact; Right Intact         Mental Status  Neurologic State: Alert  Orientation Level: Oriented X4  Cognition: Appropriate decision making; Appropriate for age attention/concentration; Appropriate safety awareness; Follows commands  Perception: Appears intact  Perseveration: No perseveration noted  Safety/Judgement: Awareness of environment                Basic ADLs (From Assessment) Complex ADLs (From Assessment)   Basic ADL  Feeding: Supervision  Oral Facial Hygiene/Grooming: Supervision  Bathing: Moderate assistance  Upper Body Dressing: Minimum assistance  Lower Body Dressing: Moderate assistance  Toileting: Total assistance     Grooming/Bathing/Dressing Activities of Daily Living     Cognitive Retraining  Safety/Judgement: Awareness of environment                 Functional Transfers  Toilet Transfer : Minimum assistance  Shower Transfer: Minimum assistance     Bed/Mat Mobility  Sit to Stand: Minimum assistance         Physical Skills Involved:  1. Range of Motion  2. Balance  3. Strength Cognitive Skills Affected (resulting in the inability to perform in a timely and safe manner):  1. None Psychosocial Skills Affected:  1. None   Number of elements that affect the Plan of Care: 1-3:  LOW COMPLEXITY   CLINICAL DECISION MAKIN [facility] [address] [address] AM-PAC 6 Clicks   Daily Activity Inpatient Short Form  How much help from another person does the patient currently need. .. Total A Lot A Little None   1. Putting on and taking off regular lower body clothing? [] 1   [x] 2   [] 3   [] 4   2. Bathing (including washing, rinsing, drying)? [] 1   [x] 2   [] 3   [] 4   3. Toileting, which includes using toilet, bedpan or urinal?   [] 1   [x] 2   [] 3   [] 4   4. Putting on and taking off regular upper body clothing? [] 1   [] 2   [x] 3   [] 4   5.   Taking care of personal grooming such as brushing teeth? [] 1   [] 2   [x] 3   [] 4   6. Eating meals? [] 1   [] 2   [x] 3   [] 4   © 2007, Trustees of [address] [place] [address] [address] [address], under license to [employer]. All rights reserved     Score:  Initial: 15 Most Recent: X (Date: -- )    Interpretation of Tool:  Represents activities that are increasingly more difficult (i.e. Bed mobility, Transfers, Gait). Score 24 23 22-20 19-15 14-10 9-7 6     Modifier CH CI CJ CK CL CM CN      ? Self Care:     - CURRENT STATUS: CK - 40%-59% impaired, limited or restricted    - GOAL STATUS: CJ - 20%-39% impaired, limited or restricted    - D/C STATUS:  ---------------To be determined---------------  [place] / Product Type: Medicare /      Medical Necessity:     · Patient is expected to demonstrate progress in balance, coordination and functional technique to decrease assistance required with self care and functional mobility. Reason for Services/Other Comments:  · Patient continues to require skilled intervention due to decreased self care and functional mobility. Use of outcome tool(s) and clinical judgement create a POC that gives a: LOW COMPLEXITY            TREATMENT:   (In addition to Assessment/Re-Assessment sessions the following treatments were rendered)     Pre-treatment Symptoms/Complaints:  none  Pain: Initial:   Pain Intensity 1: 0  Post Session:  0     Assessment/Reassessment only, no treatment provided today    Treatment/Session Assessment:     Response to Treatment:  Tolerated well.     Education:  [] Home Exercises  [x] Fall Precautions  [] Hip Precautions [] Going Home Video  [x] Knee/Hip Prosthesis Review  [x] Walker Management/Safety [x] Adaptive Equipment as Needed       Interdisciplinary Collaboration:   o Physical Therapist  o Occupational Therapist  o Registered Nurse    After treatment position/precautions:   o Supine in bed  o Bed/Chair-wheels locked  o Caregiver at bedside  o Call light within reach  o RN notified  o Side rails x 2     Compliance with Program/Exercises: compliant all of the time. Recommendations/Intent for next treatment session:  Treatment next visit will focus on increasing Ms. Dilma Wall independence with bed mobility, transfers, self care, functional mobility, modalities for pain, and patient education.       Total Treatment Duration:  OT Patient Time In/Time Out  Time In: 1520  Time Out: Nora [address], OT

## 2017-11-15 NOTE — PERIOP NOTES
TRANSFER - IN REPORT:    Verbal report received from Martir Howard on Lars Lombard  being received from [facility] for routine progression of care      Report consisted of patients Situation, Background, Assessment and   Recommendations(SBAR). Information from the following report(s) SBAR and MAR was reviewed with the receiving nurse. Opportunity for questions and clarification was provided. Assessment completed upon patients arrival to unit and care assumed.

## 2017-11-15 NOTE — PROGRESS NOTES
TRANSFER - IN REPORT:    Verbal report received from Agnes(name) on Leif Abreu  being received from PACu(unit) for routine progression of care      Report consisted of patients Situation, Background, Assessment and   Recommendations(SBAR). Information from the following report(s) SBAR, Kardex, Intake/Output and Recent Results was reviewed with the receiving nurse. Opportunity for questions and clarification was provided. Assessment completed upon patients arrival to unit and care assumed.

## 2017-11-15 NOTE — PERIOP NOTES
Betadine lavage:  17.5cc of betadine lot # [id number] , exp. Date: 07/19  ,  in 500cc of . 9NS Lot #[id number]  , exp. Date : [id number].

## 2017-11-15 NOTE — PERIOP NOTES
TRANSFER - OUT REPORT:    Verbal report given to Agnes Miller on Carmen File  being transferred to [facility]) for routine progression of care   ort consisted of patients Situation, Background, Assessment and   Recommendations(SBAR). Information from the following report(s) SBAR, Kardex, [facility] and Recent Results was reviewed with the receiving nurse. Lines:   Peripheral IV 68/39/84 Left Cephalic (Active)   Site Assessment Clean, dry, & intact 11/15/2017  7:16 AM   Phlebitis Assessment 0 11/15/2017  7:16 AM   Infiltration Assessment 0 11/15/2017  7:16 AM   Dressing Status Clean, dry, & intact 11/15/2017  7:16 AM   Dressing Type Transparent;Tape 11/15/2017  7:16 AM   Hub Color/Line Status Infusing 11/15/2017  7:16 AM   Action Taken Blood drawn 11/15/2017  7:16 AM        Opportunity for questions and clarification was provided.       Patient transported with:   [employer]

## 2017-11-15 NOTE — PROGRESS NOTES
11/15/17 1350   Oxygen Therapy   O2 Sat (%) 98 %   Pulse via Oximetry 74 beats per minute   O2 Device Nasal cannula   O2 Flow Rate (L/min) 3 l/min  (weaned to 1 )   Patient sleepy, unable to do IS at this time. No shortness of breath or distress noted. BS are clear b/l.    [facility] notes reviewed

## 2017-11-15 NOTE — OP NOTES
[facility] and [facility]  Total Knee Arthroplasty  Patient:Jackie Paz   : 1950  Medical Record [id number]    Pre-operative Diagnosis:  Primary osteoarthritis of right knee [M17.11]    Post-operative Diagnosis: Primary osteoarthritis of right knee [M17.11]    Location: [place] [address]    Date of Procedure: 11/15/2017    Surgeon: Steven Jin MD    Assistant: NONE    Anesthesia: Spinal and adductor nerve block    Procedure:  Right Total Knee Arthroplasty    Tourniquet Time: 0 minutes    EBL: 768MT    Complications: None    Patient Condition upon Completion of Procedure: Stable    Implants:   Implant Name Type Inv. Item Serial No.  Lot No. LRB No. Used   PAT ASYM MTL-BK 10MM SZ A32 -- TRIATHLON -   PAT ASYM MTL-BK 10MM SZ A32 -- TRIATHLON [id number] [employer] HOW [id number] Right 1   COMPNT FEM CR TRIATHLN 6 R PA --  - [id number]  COMPNT FEM CR TRIATHLN 6 R PA --  [id number] [employer] HOW [id number] Right 1   BASEPLT TIB PC TRITNM SZ 5 -- TRIATHLON - [id number]  BASEPLT TIB PC TRITNM SZ 5 -- TRIATHLON [id number] [employer] [facility] [employer]ED[employer] HOW [id number] Right 1   INSERT TIB ARTC BEAR SZ5 13MM --  - [id number]   INSERT TIB ARTC BEAR SZ5 13MM --  [id number] [employer] HOW [id number] Right 1       Intra-Operative Findings: Pre-operatively we assess the motion of the patients knee and noted that the knee lacked approximately 5 degrees of extension. Intra-operatively we noted that the articular surfaces were arthritic with cartilage loss of both the medial and lateral compartments. The patella was examined and found to be in poor condition. The patella was resurfaced. With trial components in place we assessed knee motion and found that the knee was able to fully extend. Flexion was felt to be 135 degrees. Description of Procedure:    Kellen Perez had undergone adductor canal block in the preoperative holding area.  Kellen Perez was brought to the operating room, positioned on the operating room table, and after appropriate identification underwent spinal anesthesia. A giraldo catheter was then placed. IV antibiotics were administered per proticol. A pneumatic tourniquet was placed about the limb. The right leg and was then prepped and draped in the usual sterile manner. Timeout was taken identifying the patient, procedure ,operative side and surgeon. Prior to incision one gram of IV transexamic acid was administered intravenously. An anterior longitudinal incision was made just medial to the tibial tubercle and extending proximal.  A medial parapatellar capsular incision was performed. The medial capsular flap was elevated around to the midcoronal plane. The patella was subluxed laterally and patellar fat pat partially excised. The knee was flexed and externally rotated. The articular surface revealed cartilage loss with exposed bone and bone spurs throughout all three compartments. The anterior cruciate ligament was resected. We first addressed our distal femoral resection. Using intramedullary instrumentation we resected 8mm of distal femur using a 5 degree valgus cut angle. Medial and lateral condylar cuts were verified to be level using the capture of the distal femoral cutting jig. We next addressed our proximal tibia. Using extramedullary intrstrumentation we resected 4mm of bone as measured from the more involved compartment. Our cut was verified to be perpendicular to the meachanical axis of the tibia as referenced from the tibial tubercle, the long axis of the tibia, the center of the ankle, and the patients 2nd toe. Our slope was cut with the goal of 0-3 degrees posterior slope. At this point a spacer block was used to verify that the knee was able to obtain full extension and was balanced in extension.  We did not have to resect additional bone to achieve this goal. Once the knee was felt to fully extend and showed good balance in extension the distal femoral pins were removed and we moved on to finishing our distal femoral preparation. Prior to sizing our distal femur we marked Josh's Line and our transepicondylar axis. Using sizing instrumentation the femur was sized to a #6 component. The anterior and posterior cuts along with the anterior and posterior chamfer cuts were then made using the 4-in-1 cutting block. Osteophytes were removed from the tibial and femoral surfaces. The PCL was assessed and felt to be in good condition and be very stable. Medial and lateral meniscus' were removed along with any redundant tissue. Any posterior osteophytes were removed. We then returned our attention to the tibial side. The tibia was then sized to a #5 component. The tibial component was assessed in terms of position and rotation. Once we felt that we had obtained symmetric coverage of the proximal tibia and had rotated the tibial tray to a point where the midline of the component was bisecting the middle and medial 3rd of the tibial tubercle we marked the proximal tibia with bovie cautery to yaniv rotation and also pinned the tibial tray into place. We then performed a trial of the knee with trial components in place. We felt that with a 13 mm polyethylene trial in place the knee had acceptable varus and valgus stability throughout arc of motion, was able to fully extend, was not too tight in flexion, and had acceptable stability with anterior  Drawer testing. No additional surgical releases were required. Again we assessed our PCL and felt it was stable and in good condition. The patella was then everted and examined. The patella was felt to be in poor condition and the decision was made to  resurface the patella. Bone was resected to accomodate a size 32mm patella button. A trial reduction revealed appropriate tracking through the patellofemoral groove with no lateral retinacular release required.  Again patellar tracking was assessed and it was felt that the patella was tracking appropriately within the femoral trochlear groove. At this point the trial polyethylene component and trial femoral component were removed. We again assessed our tibial tray and verified that we were satisfied with its position. We did not have to adjust its position. The proximal tibia was punched and drilled per protocol for the system. We irrigated and debrided all bony surfaces of residual tissue and bone. We then inserted the tibial and femoral components and noted them to be well seated. We then inserted our final polyethylene component which was a 13mm thick CS insert. Allie Sales's knee was placed through a range of motion and noted to be stable as mentioned above with the trial components. In additional we checked patellar tracking one last time which was felt to be appropriate. A lavage of diluted betadine solution of  17.5 ml Betadine in 500 of 0.9% normal saline was allowed to soak in the wound for 3 minutes after implanting of the prosthesis. The wound was irrigated with normal saline again before closure. We then carefully injected periarticular cocktail about and capsule and subcutaneous tissue. In addition, one additional gram of transexemic acid was given at the end of the case for a total dose of 2 grams. No drain was placed. The capsular layer was closed using a combination of 0-Stratafix bidirectional barbed suture and #2 Fiberwire. The subcutaneous layer was closed using a 2-0 Vicryl interrupted suture. The skin was closed using staples. A sterile dressing was applied. A cryo pad was applied on the operative leg. The sponge count and needle counts were correct. Patient was transferred to the hospital stretcher and transported to recovery in stable condition.     Signed By: Rashard Neumann MD

## 2017-11-15 NOTE — ANESTHESIA POSTPROCEDURE EVALUATION
Post-Anesthesia Evaluation and Assessment    Patient: Vishnu Foster MRN: [id number]  SSN: [id number]    YOB: 1950  Age: 78 y.o. Sex: female       Cardiovascular Function/Vital Signs  Visit Vitals    /85    Pulse 86    Temp 37.3 °C (99.2 °F)    Resp 16    Ht 5' 5.5\" (1.664 m)    Wt 106.1 kg (234 lb)    SpO2 100%    BMI 38.35 kg/m2       Patient is status post general anesthesia for Procedure(s):  RIGHT KNEE ARTHROPLASTY TOTAL / RISSA. Nausea/Vomiting: None    Postoperative hydration reviewed and adequate. Pain:  Pain Scale 1: Numeric (0 - 10) (11/15/17 1225)  Pain Intensity 1: 10 (11/15/17 1225)   Managed    Neurological Status:   Neuro (WDL): Exceptions to WDL (11/15/17 1150)  Neuro  Neurologic State: Drowsy (11/15/17 1150)   At baseline    Mental Status and Level of Consciousness: Arousable    Pulmonary Status:   O2 Device: CO2 nasal cannula (11/15/17 1150)   Adequate oxygenation and airway patent    Complications related to anesthesia: None    Post-anesthesia assessment completed.  No concerns    Signed By: Reji Jenkins MD     November 15, 2017

## 2017-11-15 NOTE — PERIOP NOTES
TRANSFER - OUT REPORT:    Verbal report given to Dinora brooks RN(name) on Celeste Limb  being transferred to [facility](unit) for routine post - op       Report consisted of patients Situation, Background, Assessment and   Recommendations(SBAR). Information from the following report(s) SBAR, Kardex, OR Summary, Procedure Summary, Intake/Output and MAR was reviewed with the receiving nurse. Opportunity for questions and clarification was provided.       Patient transported with:   O2 @ 3 liters  Tech

## 2017-11-16 LAB
ANION GAP SERPL CALC-SCNC: 9 MMOL/L (ref 7–16)
BUN SERPL-MCNC: 13 MG/DL (ref 8–23)
CALCIUM SERPL-MCNC: 8.4 MG/DL (ref 8.3–10.4)
CHLORIDE SERPL-SCNC: 105 MMOL/L (ref 98–107)
CO2 SERPL-SCNC: 24 MMOL/L (ref 21–32)
CREAT SERPL-MCNC: 0.6 MG/DL (ref 0.6–1)
GLUCOSE SERPL-MCNC: 109 MG/DL (ref 65–100)
HGB BLD-MCNC: 11.1 G/DL (ref 11.7–15.4)
POTASSIUM SERPL-SCNC: 5.8 MMOL/L (ref 3.5–5.1)
SODIUM SERPL-SCNC: 138 MMOL/L (ref 136–145)

## 2017-11-16 PROCEDURE — 74011250636 HC RX REV CODE- 250/636: Performed by: ORTHOPAEDIC SURGERY

## 2017-11-16 PROCEDURE — 65270000029 HC RM PRIVATE

## 2017-11-16 PROCEDURE — 74011250637 HC RX REV CODE- 250/637: Performed by: ORTHOPAEDIC SURGERY

## 2017-11-16 PROCEDURE — 85018 HEMOGLOBIN: CPT | Performed by: ORTHOPAEDIC SURGERY

## 2017-11-16 PROCEDURE — 36415 COLL VENOUS BLD VENIPUNCTURE: CPT | Performed by: ORTHOPAEDIC SURGERY

## 2017-11-16 PROCEDURE — 80048 BASIC METABOLIC PNL TOTAL CA: CPT | Performed by: ORTHOPAEDIC SURGERY

## 2017-11-16 PROCEDURE — 97110 THERAPEUTIC EXERCISES: CPT

## 2017-11-16 PROCEDURE — 97535 SELF CARE MNGMENT TRAINING: CPT

## 2017-11-16 PROCEDURE — 94760 N-INVAS EAR/PLS OXIMETRY 1: CPT

## 2017-11-16 PROCEDURE — 97116 GAIT TRAINING THERAPY: CPT

## 2017-11-16 PROCEDURE — 97150 GROUP THERAPEUTIC PROCEDURES: CPT

## 2017-11-16 RX ADMIN — ASPIRIN 325 MG: 325 TABLET, DELAYED RELEASE ORAL at 09:05

## 2017-11-16 RX ADMIN — Medication 10 ML: at 22:39

## 2017-11-16 RX ADMIN — Medication 10 ML: at 20:05

## 2017-11-16 RX ADMIN — ACETAMINOPHEN 1000 MG: 500 TABLET, FILM COATED ORAL at 04:57

## 2017-11-16 RX ADMIN — CELECOXIB 200 MG: 200 CAPSULE ORAL at 09:04

## 2017-11-16 RX ADMIN — OXYCODONE HYDROCHLORIDE 10 MG: 10 TABLET, FILM COATED, EXTENDED RELEASE ORAL at 20:05

## 2017-11-16 RX ADMIN — Medication 400 MG: at 09:05

## 2017-11-16 RX ADMIN — HYDROMORPHONE HYDROCHLORIDE 1 MG: 1 INJECTION, SOLUTION INTRAMUSCULAR; INTRAVENOUS; SUBCUTANEOUS at 22:38

## 2017-11-16 RX ADMIN — OXYCODONE HYDROCHLORIDE AND ACETAMINOPHEN 500 MG: 500 TABLET ORAL at 09:05

## 2017-11-16 RX ADMIN — HYDROMORPHONE HYDROCHLORIDE 1 MG: 1 INJECTION, SOLUTION INTRAMUSCULAR; INTRAVENOUS; SUBCUTANEOUS at 14:35

## 2017-11-16 RX ADMIN — HYDROMORPHONE HYDROCHLORIDE 2 MG: 2 TABLET ORAL at 09:04

## 2017-11-16 RX ADMIN — ASPIRIN 325 MG: 325 TABLET, DELAYED RELEASE ORAL at 20:05

## 2017-11-16 RX ADMIN — ACETAMINOPHEN 1000 MG: 500 TABLET, FILM COATED ORAL at 12:57

## 2017-11-16 RX ADMIN — ONDANSETRON 8 MG: 8 TABLET, ORALLY DISINTEGRATING ORAL at 22:38

## 2017-11-16 RX ADMIN — MULTIPLE VITAMINS W/ MINERALS TAB 1 TABLET: TAB at 09:05

## 2017-11-16 RX ADMIN — CYCLOBENZAPRINE HYDROCHLORIDE 5 MG: 10 TABLET, FILM COATED ORAL at 20:05

## 2017-11-16 RX ADMIN — ACETAMINOPHEN 1000 MG: 500 TABLET, FILM COATED ORAL at 17:13

## 2017-11-16 RX ADMIN — CYCLOBENZAPRINE HYDROCHLORIDE 5 MG: 10 TABLET, FILM COATED ORAL at 09:06

## 2017-11-16 RX ADMIN — GABAPENTIN 100 MG: 100 CAPSULE ORAL at 17:13

## 2017-11-16 RX ADMIN — CYCLOBENZAPRINE HYDROCHLORIDE 5 MG: 10 TABLET, FILM COATED ORAL at 17:13

## 2017-11-16 RX ADMIN — HYDROMORPHONE HYDROCHLORIDE 2 MG: 2 TABLET ORAL at 12:57

## 2017-11-16 RX ADMIN — HYDROMORPHONE HYDROCHLORIDE 2 MG: 2 TABLET ORAL at 20:05

## 2017-11-16 RX ADMIN — CELECOXIB 200 MG: 200 CAPSULE ORAL at 20:05

## 2017-11-16 RX ADMIN — OXYCODONE HYDROCHLORIDE 10 MG: 10 TABLET, FILM COATED, EXTENDED RELEASE ORAL at 09:05

## 2017-11-16 RX ADMIN — HYDROMORPHONE HYDROCHLORIDE 2 MG: 2 TABLET ORAL at 04:57

## 2017-11-16 RX ADMIN — GABAPENTIN 100 MG: 100 CAPSULE ORAL at 09:05

## 2017-11-16 RX ADMIN — SENNOSIDES AND DOCUSATE SODIUM 2 TABLET: 8.6; 5 TABLET ORAL at 09:05

## 2017-11-16 RX ADMIN — TRANEXAMIC ACID 1300 MG: 650 TABLET, FILM COATED ORAL at 09:07

## 2017-11-16 NOTE — PROGRESS NOTES
Problem: Mobility Impaired (Adult and Pediatric)  Goal: *Acute Goals and Plan of Care (Insert Text)  GOALS (1-4 days):  (1.)Ms. Abdulaziz Collado will move from supine to sit and sit to supine  in bed with SUPERVISION. (2.)Ms. Abdulaziz Collado will transfer from bed to chair and chair to bed with STAND BY ASSIST using the least restrictive device. Goal met  (3.)Ms. Abdulaziz Collado will ambulate with STAND BY ASSIST for 200 feet with the least restrictive device. (4.)Ms. Abdulaziz Collado will ambulate up/down 3 steps with no railing with MINIMAL ASSIST with no device. (5.)Ms. Abdulaziz Collado will increase right knee ROM to 5°-80°.  ________________________________________________________________________________________________    PHYSICAL THERAPY [facility] tKa: Daily Note, PM 11/16/2017  INPATIENT: Hospital Day: 2  [place] / Product Type: Medicare /      NAME/AGE/GENDER: Russell Matt is a 78 y.o. female   PRIMARY DIAGNOSIS:  Primary osteoarthritis of right knee [M17.11]   Procedure(s) and Anesthesia Type:     * RIGHT KNEE ARTHROPLASTY TOTAL / RISSA - Spinal (Right)  ICD-10: Treatment Diagnosis:    · Pain in Right Knee (M25.561)  · Stiffness of Right Knee, Not elsewhere classified (M25.661)  · Difficulty in walking, Not elsewhere classified (R26.2)      ASSESSMENT:     Ms. Abdulaziz Collado presents with impaired strength & mobility s/p right TKA. Pt also showed decreased stability during out of bed activity. Pt will benefit from follow up therapy to help restore safe function prior to returning home with caregiver. She did well today and increased her gait distance. She is planning on returning home. Did well with group therapy this afternoon but hurting more. This section established at most recent assessment   PROBLEM LIST (Impairments causing functional limitations):  1. Decreased Strength  2. Decreased ADL/Functional Activities  3. Decreased Transfer Abilities  4. Decreased Ambulation Ability/Technique  5.  Decreased Balance  6. Increased Pain  7. Decreased Activity Tolerance  8. Decreased Flexibility/Joint Mobility  9. Decreased [place] with Home Exercise Program   INTERVENTIONS PLANNED: (Benefits and precautions of physical therapy have been discussed with the patient.)  1. Bed Mobility  2. Gait Training  3. Home Exercise Program (HEP)  4. Therapeutic Exercise/Strengthening  5. Transfer Training  6. Range of Motion: active/assisted/passive  7. Therapeutic Activities  8. Group Therapy     TREATMENT PLAN: Frequency/Duration: Follow patient BID   to address above goals. Rehabilitation Potential For Stated Goals: Good     RECOMMENDED REHABILITATION/EQUIPMENT: (at time of discharge pending progress): Continue Skilled Therapy and Home Health: Physical Therapy. HISTORY:   History of Present Injury/Illness (Reason for Referral): The patient has end stage arthritis of the right knee. The patient was evaluated and examined during a consultation prior to today. The patient complains of knee pain with activities, reports pain as mostly occurring along the joint lines, reports stiffness of the knee with prolonged inactivity, and swelling/pain at the end of the day and after increased physical activity. The pain affects the patients activities of daily living and quality of life. The patient has attempted and exhausted conservative treatment. There have been no changes to the patient's orthopedic condition since the last office visit. Past Medical History/Comorbidities:   Ms. Abdulaziz Collado  has a past medical history of Arthritis; BMI 38.0-38.9,adult; Chronic pain; GERD (gastroesophageal reflux disease); Insomnia; and Liver disease. She also has no past medical history of Adverse effect of anesthesia; Aneurysm ([address] Arrhythmia; Asthma; Autoimmune disease ([address] CAD (coronary artery disease); Cancer ([address] Chronic kidney disease; Chronic obstructive pulmonary disease ([address] Coagulation disorder ([address] Diabetes ([address]  Difficult intubation; Endocarditis; Heart failure ([facility] Hypertension; Malignant hyperthermia due to anesthesia; Nausea & vomiting; Nicotine vapor product user; Non-nicotine vapor product user; Other unknown and unspecified cause of morbidity or mortality; Pseudocholinesterase deficiency; Psychiatric disorder; PUD (peptic ulcer disease); Rheumatic fever; Seizures ([facility] Stroke [facility]); Thromboembolus ([facility] [address] Thyroid disease; or Unspecified sleep apnea. Ms. Libby Mares  has a past surgical history that includes heent; tonsillectomy (1961); lap cholecystectomy; carpal tunnel release (Left); carpal tunnel release (Right); orthopaedic (Left); orthopaedic (Left); hysterectomy; knee arthroscopy (Right); bunionectomy (Right); and rotator cuff repair (Right). Social History/Living Environment:   Home Environment: Private residence  # Steps to Enter: 3  Rails to Enter: No  One/Two Story Residence: Two story, live on 1st floor  # of Interior Steps: [id number] [facility] Floor: Left  Living Alone: No  Support Systems: Child(scott)  Patient Expects to be Discharged to[de-identified] Private residence  Current DME Used/Available at Home: Walker, rolling  Tub or Shower Type: Tub/Shower combination  Prior Level of Function/Work/Activity:  Pt was independent without an assistive device prior to this admission   Number of Personal Factors/Comorbidities that affect the Plan of Care: 3+: HIGH COMPLEXITY   EXAMINATION:   Most Recent Physical Functioning:               RLE AROM  R Knee Flexion: 70  R Knee Extension: 7            Bed Mobility  Supine to Sit:  (up in chair)  Sit to Supine:  (up in chair)  Scooting: Stand-by asssistance    Transfers  Sit to Stand: Stand-by asssistance  Stand to Sit: Stand-by asssistance  Bed to Chair: Stand-by asssistance    Balance  Sitting: Intact  Standing: Pull to stand; With support              Weight Bearing Status  Right Side Weight Bearing: As tolerated  Distance (ft): 160 Feet (ft)  Ambulation - Level of Assistance: Stand-by asssistance  Assistive Device: Walker, rolling  Speed/Francia: Pace decreased (<100 feet/min)  Step Length: Left shortened  Stance: Right decreased  Gait Abnormalities: Antalgic;Decreased step clearance; Step to gait        Braces/Orthotics: none    Right Knee Cold  Type: Cryocuff      Body Structures Involved:  1. Joints  2. Muscles Body Functions Affected:  1. Sensory/Pain  2. Movement Related Activities and Participation Affected:  1. General Tasks and Demands  2. Mobility   Number of elements that affect the Plan of Care: 4+: HIGH COMPLEXITY   CLINICAL PRESENTATION:   Presentation: Stable and uncomplicated: LOW COMPLEXITY   CLINICAL DECISION MAKIN[address] [place] [address] AM-PAC 6 Clicks   Basic Mobility Inpatient Short Form  How much difficulty does the patient currently have. .. Unable A Lot A Little None   1. Turning over in bed (including adjusting bedclothes, sheets and blankets)? [] 1   [] 2   [x] 3   [] 4   2. Sitting down on and standing up from a chair with arms ( e.g., wheelchair, bedside commode, etc.)   [] 1   [] 2   [x] 3   [] 4   3. Moving from lying on back to sitting on the side of the bed? [] 1   [] 2   [x] 3   [] 4   How much help from another person does the patient currently need. .. Total A Lot A Little None   4. Moving to and from a bed to a chair (including a wheelchair)? [] 1   [] 2   [x] 3   [] 4   5. Need to walk in hospital room? [] 1   [] 2   [x] 3   [] 4   6. Climbing 3-5 steps with a railing? [x] 1   [] 2   [] 3   [] 4   © , Trustees of [address] [place] [address], under license to [employer]. All rights reserved        Score:  Initial: 16 Most Recent: X (Date: -- )    Interpretation of Tool:  Represents activities that are increasingly more difficult (i.e. Bed mobility, Transfers, Gait). Score 24 23 22-20 19-15 14-10 9-7 6     Modifier CH CI CJ CK CL CM CN      ?  Mobility - Walking and Moving Around:     - CURRENT STATUS: CK - 40%-59% impaired, limited or restricted    - GOAL STATUS: CJ - 20%-39% impaired, limited or restricted    - D/C STATUS:  ---------------To be determined---------------  [place] / Product Type: Medicare /      Medical Necessity:     · Patient is expected to demonstrate progress in strength, range of motion, balance, coordination and functional technique to decrease assistance required with bed mobility, transfers & gait. Reason for Services/Other Comments:  · Patient continues to require skilled intervention due to pt not safe with functional mobility. Use of outcome tool(s) and clinical judgement create a POC that gives a: Clear prediction of patient's progress: LOW COMPLEXITY            TREATMENT:   (In addition to Assessment/Re-Assessment sessions the following treatments were rendered)     Pre-treatment Symptoms/Complaints:  nausea  Pain: Initial: visual scale  Pain Intensity 1: 6  Post Session:  6/10     Gait Training (15 Minutes):  Gait training to improve and/or restore physical functioning as related to mobility and strength. Ambulated 160 Feet (ft) with Stand-by asssistance using a Walker, rolling and minimal  verbal cues related to their stance phase to promote proper body posture. Therapeutic Exercise: (45 Minutes (group therapy)):  Exercises per grid below to improve mobility and strength. Required minimal verbal cues to promote proper body alignment. Date:  11/16 Date:   Date:     ACTIVITY/EXERCISE AM PM AM PM AM PM   GROUP THERAPY  []  [x]  []  []  []  []   Ankle Pumps 10 15       Quad Sets 10 15       Gluteal Sets 10 15       Hip ABd/ADduction 10 15       Straight Leg Raises 10 15       Knee Slides 10 15       Short Arc Quads  15       Long Arc Quads         Chair Slides  15                B = bilateral; AA = active assistive; A = active; P = passive      Treatment/Session Assessment:     Response to Treatment:  Tolerated well.     Education:  [x] Home Exercises  [x] Fall Precautions  [] Hip Precautions [x] D/C Instruction Review  [x] Knee/Hip Prosthesis Review  [x] Walker Management/Safety [] Adaptive Equipment as Needed       Interdisciplinary Collaboration:   o Physical Therapist  o Registered Nurse    After treatment position/precautions:   o Supine in bed  o Bed/Chair-wheels locked  o Bed in low position  o Call light within reach  o RN notified    Compliance with Program/Exercises: Will assess as treatment progresses. Recommendations/Intent for next treatment session:  Treatment next visit will focus on increasing Ms. Marvin Nayak independence with bed mobility, transfers, gait training, strength/ROM exercises, modalities for pain, and patient education.       Total Treatment Duration:  PT Patient Time In/Time Out  Time In: 1300  Time Out: Dayana Harden 95, PT

## 2017-11-16 NOTE — PROGRESS NOTES
Problem: Self Care Deficits Care Plan (Adult)  Goal: *Acute Goals and Plan of Care (Insert Text)  GOALS:   DISCHARGE GOALS (in preparation for going home/rehab):  3 days  1. Ms. Onel Roque will perform one lower body dressing activity with minimal assistance required to demonstrate improved functional mobility and safety. GOAL MET 11/16/2017     2. Ms. Onel Roque will perform one lower body bathing activity with minimal assistance required to demonstrate improved functional mobility and safety. GOAL MET 11/16/2017     3. Ms. Onel Roque will perform toileting/toilet transfer with contact guard assistance to demonstrate improved functional mobility and safety. GOAL MET 11/16/2017     4. Ms. Onel Roque will perform shower transfer with contact guard assistance to demonstrate improved functional mobility and safety. GOAL MET 11/16/2017       JOINT CAMP OCCUPATIONAL THERAPY TKA: Daily Note and AM 11/16/2017  INPATIENT: Hospital Day: 2  [place] / Product Type: Medicare /      NAME/AGE/GENDER: True Toro is a 78 y.o. female   PRIMARY DIAGNOSIS:  Primary osteoarthritis of right knee [M17.11]   Procedure(s) and Anesthesia Type:     * RIGHT KNEE ARTHROPLASTY TOTAL / RISSA - Spinal (Right)  ICD-10: Treatment Diagnosis:    · Pain in Right Knee (M25.561)  · Stiffness of Right Knee, Not elsewhere classified (M25.661)  · Other lack of cordination (R27.8)      ASSESSMENT:    Ms. Onel Roque is s/p right TKA and presents with decreased weight bearing on right LE and decreased independence with functional mobility and activities of daily living. Patient completed shower and dressing as charter below in ADL grid and is ambulating with rolling walker and contact guard assist.  Patient has met 4/4 goals and plans to return home with good family support. Family able to provide patient with appropriate level of assistance at this time.   OT reviewed safety precautions throughout session and therapy schedule for the remainder of today. Patient instructed to call for assistance when needing to get up from recliner and all needs in reach. Patient verbalized understanding of call light. This section established at most recent assessment   PROBLEM LIST (Impairments causing functional limitations):  1. Decreased Strength  2. Decreased ADL/Functional Activities  3. Decreased Transfer Abilities  4. Increased Pain  5. Increased Fatigue  6. Decreased Flexibility/Joint Mobility  7. Decreased Knowledge of Precautions   INTERVENTIONS PLANNED: (Benefits and precautions of occupational therapy have been discussed with the patient.)  1. Activities of daily living training  2. Adaptive equipment training  3. Balance training  4. Clothing management  5. Donning&doffing training  6. Theraputic activity     TREATMENT PLAN: Frequency/Duration: Follow patient 1 time to address above goals. Rehabilitation Potential For Stated Goals: Excellent     RECOMMENDED REHABILITATION/EQUIPMENT: (at time of discharge pending progress): Continue Skilled Therapy and Home Health: Physical Therapy. OCCUPATIONAL PROFILE AND HISTORY:   History of Present Injury/Illness (Reason for Referral): Pt presents this date s/p (right) TKA. Past Medical History/Comorbidities:   Ms. Junito Griffith  has a past medical history of Arthritis; BMI 38.0-38.9,adult; Chronic pain; GERD (gastroesophageal reflux disease); Insomnia; and Liver disease. She also has no past medical history of Adverse effect of anesthesia; Aneurysm ([address] Arrhythmia; Asthma; Autoimmune disease ([address] CAD (coronary artery disease); Cancer ([address] Chronic kidney disease; Chronic obstructive pulmonary disease ([address] Coagulation disorder ([address] Diabetes ([address] Difficult intubation; Endocarditis; Heart failure ([address] Hypertension; Malignant hyperthermia due to anesthesia; Nausea & vomiting; Nicotine vapor product user; Non-nicotine vapor product user;  Other unknown and unspecified cause of morbidity or mortality; Pseudocholinesterase deficiency; Psychiatric disorder; PUD (peptic ulcer disease); Rheumatic fever; Seizures ([facility] Stroke [facility]); Thromboembolus ([facility] Thyroid disease; or Unspecified sleep apnea. Ms. General Henderson  has a past surgical history that includes heent; tonsillectomy (1961); lap cholecystectomy; carpal tunnel release (Left); carpal tunnel release (Right); orthopaedic (Left); orthopaedic (Left); hysterectomy; knee arthroscopy (Right); bunionectomy (Right); and rotator cuff repair (Right). Social History/Living Environment:   Home Environment: Private residence  # Steps to Enter: 3  Rails to Enter: No  One/Two Story Residence: Two story, live on 1st floor  # of Interior Steps: [address]: Left  Living Alone: Yes  Support Systems: Child(scott)  Patient Expects to be Discharged to[de-identified] Private residence  Current DME Used/Available at Home: Walker, rolling  Tub or Shower Type: Tub/Shower combination  Prior Level of Function/Work/Activity:  Indep with BADLs and IADLs     Number of Personal Factors/Comorbidities that affect the Plan of Care: Brief history (0):  LOW COMPLEXITY   ASSESSMENT OF OCCUPATIONAL PERFORMANCE[de-identified]   Most Recent Physical Functioning:   Balance  Sitting: Intact  Standing: Pull to stand; With support       Patient Vitals for the past 6 hrs:   BP BP Patient Position SpO2 Pulse   11/16/17 0655 106/69 At rest 96 % 70   11/16/17 0801 - - 94 % -                              Mental Status  Neurologic State: Alert  Orientation Level: Oriented X4  Cognition: Appropriate decision making; Appropriate for age attention/concentration; Appropriate safety awareness; Follows commands  Perception: Appears intact  Perseveration: No perseveration noted  Safety/Judgement: Awareness of environment; Fall prevention                Basic ADLs (From Assessment) Complex ADLs (From Assessment)   Basic ADL  Feeding: Supervision  Oral Facial Hygiene/Grooming: Supervision  Bathing:  Moderate assistance  Upper Body Dressing: Minimum assistance  Lower Body Dressing: Moderate assistance  Toileting: Total assistance     Grooming/Bathing/Dressing Activities of Daily Living   Grooming  Grooming Assistance: Supervision/set up  Washing Face: Supervision/set-up  Washing Hands: Supervision/set-up  Applying Makeup: Supervision/set-up  Brushing/Combing Hair: Supervision/set-up Cognitive Retraining  Safety/Judgement: Awareness of environment; Fall prevention   Upper Body Bathing  Bathing Assistance: Supervision/set-up  Position Performed: Seated in chair     Lower Body Bathing  Bathing Assistance: Minimum assistance  Perineal  : Supervision/set-up  Lower Body : Minimum assistance  Position Performed: Seated in chair  Adaptive Equipment: Shower chair     Upper Body Dressing Assistance  Dressing Assistance: Supervision/set-up  Bra: Supervision/set-up  Front Opened Shirt: Supervision/set-up Functional Transfers  Bathroom Mobility: Contact guard assistance  Toilet Transfer : Contact guard assistance  Shower Transfer: Contact guard assistance   Lower Body Dressing Assistance  Dressing Assistance: Minimum assistance  Underpants: Minimum assistance  Pants With Elastic Waist: Minimum assistance  Socks: Minimum assistance  Position Performed: Seated in chair  Adaptive Equipment Used: Walker Bed/Mat Mobility  Supine to Sit: Contact guard assistance  Sit to Supine:  (up in chair)  Sit to Stand: Contact guard assistance  Bed to Chair: Contact guard assistance  Scooting: Stand-by asssistance         Physical Skills Involved:  1. Range of Motion  2. Balance  3. Strength Cognitive Skills Affected (resulting in the inability to perform in a timely and safe manner):  1. None Psychosocial Skills Affected:  1.  None   Number of elements that affect the Plan of Care: 1-3:  LOW COMPLEXITY   CLINICAL DECISION MAKIN [facility] [address] [address] AM-PAC 6 Clicks   Daily Activity Inpatient Short Form  How much help from another person does the patient currently need... Total A Lot A Little None   1. Putting on and taking off regular lower body clothing? [] 1   [x] 2   [] 3   [] 4   2. Bathing (including washing, rinsing, drying)? [] 1   [x] 2   [] 3   [] 4   3. Toileting, which includes using toilet, bedpan or urinal?   [] 1   [x] 2   [] 3   [] 4   4. Putting on and taking off regular upper body clothing? [] 1   [] 2   [x] 3   [] 4   5. Taking care of personal grooming such as brushing teeth? [] 1   [] 2   [x] 3   [] 4   6. Eating meals? [] 1   [] 2   [x] 3   [] 4   © 2007, Trustees of [address] [place] [address] [address] [address], under license to [employer] All rights reserved     Score:  Initial: 15 Most Recent: X (Date: -- )    Interpretation of Tool:  Represents activities that are increasingly more difficult (i.e. Bed mobility, Transfers, Gait). Score 24 23 22-20 19-15 14-10 9-7 6     Modifier CH CI CJ CK CL CM CN      ? Self Care:     - CURRENT STATUS: CK - 40%-59% impaired, limited or restricted    - GOAL STATUS: CJ - 20%-39% impaired, limited or restricted    - D/C STATUS:  ---------------To be determined---------------  [place] / Product Type: Medicare /      Medical Necessity:     · Patient is expected to demonstrate progress in balance, coordination and functional technique to decrease assistance required with self care and functional mobility. Reason for Services/Other Comments:  · Patient continues to require skilled intervention due to decreased self care and functional mobility.    Use of outcome tool(s) and clinical judgement create a POC that gives a: LOW COMPLEXITY            TREATMENT:   (In addition to Assessment/Re-Assessment sessions the following treatments were rendered)     Pre-treatment Symptoms/Complaints:  none  Pain: Initial:   Pain Intensity 1: 3  Pain Location 1: Knee  Pain Orientation 1: Right  Pain Intervention(s) 1: Shower  Post Session:  0     Self Care: (30): Procedure(s) (per grid) utilized to improve and/or restore self-care/home management as related to dressing, bathing, toileting and grooming. Required minimal verbal, tactile and   cueing to facilitate activities of daily living skills. Treatment/Session Assessment:     Response to Treatment:  Tolerated well. Education:  [] Home Exercises  [x] Fall Precautions  [] Hip Precautions [] Going Home Video  [x] Knee/Hip Prosthesis Review  [x] Walker Management/Safety [x] Adaptive Equipment as Needed       Interdisciplinary Collaboration:   o Physical Therapist  o Occupational Therapist  o Registered Nurse    After treatment position/precautions:   o Up in chair  o Bed/Chair-wheels locked  o Caregiver at bedside  o Call light within reach  o RN notified     Compliance with Program/Exercises: compliant all of the time. Recommendations/Intent for next treatment session:  Treatment next visit will focus on increasing Ms. Neha Vazquez independence with bed mobility, transfers, self care, functional mobility, modalities for pain, and patient education.       Total Treatment Duration:  OT Patient Time In/Time Out  Time In: 1015  Time Out: [address]   30 mins     Arleth Leyva OT

## 2017-11-16 NOTE — PROGRESS NOTES
Problem: Mobility Impaired (Adult and Pediatric)  Goal: *Acute Goals and Plan of Care (Insert Text)  GOALS (1-4 days):  (1.)Ms. Maddy Acosta will move from supine to sit and sit to supine  in bed with SUPERVISION. (2.)Ms. Maddy Acosta will transfer from bed to chair and chair to bed with STAND BY ASSIST using the least restrictive device. (3.)Ms. Maddy Acosta will ambulate with STAND BY ASSIST for 200 feet with the least restrictive device. (4.)Ms. Maddy Acosta will ambulate up/down 3 steps with no railing with MINIMAL ASSIST with no device. (5.)Ms. Maddy Acosta will increase right knee ROM to 5°-80°.  ________________________________________________________________________________________________    PHYSICAL THERAPY Joint camp tKa: Daily Note, AM 11/16/2017  INPATIENT: Hospital Day: 2  [place] / Product Type: Medicare /      NAME/AGE/GENDER: Allegra Tao is a 78 y.o. female   PRIMARY DIAGNOSIS:  Primary osteoarthritis of right knee [M17.11]   Procedure(s) and Anesthesia Type:     * RIGHT KNEE ARTHROPLASTY TOTAL / RISSA - Spinal (Right)  ICD-10: Treatment Diagnosis:    · Pain in Right Knee (M25.561)  · Stiffness of Right Knee, Not elsewhere classified (M25.661)  · Difficulty in walking, Not elsewhere classified (R26.2)      ASSESSMENT:     Ms. Maddy Acosta presents with impaired strength & mobility s/p right TKA. Pt also showed decreased stability during out of bed activity. Pt will benefit from follow up therapy to help restore safe function prior to returning home with caregiver. She did well today and increased her gait distance. She is planning on returning home. This section established at most recent assessment   PROBLEM LIST (Impairments causing functional limitations):  1. Decreased Strength  2. Decreased ADL/Functional Activities  3. Decreased Transfer Abilities  4. Decreased Ambulation Ability/Technique  5. Decreased Balance  6. Increased Pain  7. Decreased Activity Tolerance  8.  Decreased Flexibility/Joint Mobility  9. Decreased Patrick with Home Exercise Program   INTERVENTIONS PLANNED: (Benefits and precautions of physical therapy have been discussed with the patient.)  1. Bed Mobility  2. Gait Training  3. Home Exercise Program (HEP)  4. Therapeutic Exercise/Strengthening  5. Transfer Training  6. Range of Motion: active/assisted/passive  7. Therapeutic Activities  8. Group Therapy     TREATMENT PLAN: Frequency/Duration: Follow patient BID   to address above goals. Rehabilitation Potential For Stated Goals: Good     RECOMMENDED REHABILITATION/EQUIPMENT: (at time of discharge pending progress): Continue Skilled Therapy and Home Health: Physical Therapy. HISTORY:   History of Present Injury/Illness (Reason for Referral): The patient has end stage arthritis of the right knee. The patient was evaluated and examined during a consultation prior to today. The patient complains of knee pain with activities, reports pain as mostly occurring along the joint lines, reports stiffness of the knee with prolonged inactivity, and swelling/pain at the end of the day and after increased physical activity. The pain affects the patients activities of daily living and quality of life. The patient has attempted and exhausted conservative treatment. There have been no changes to the patient's orthopedic condition since the last office visit. Past Medical History/Comorbidities:   Ms. Florence Marroquin  has a past medical history of Arthritis; BMI 38.0-38.9,adult; Chronic pain; GERD (gastroesophageal reflux disease); Insomnia; and Liver disease. She also has no past medical history of Adverse effect of anesthesia; Aneurysm ([address] Arrhythmia; Asthma; Autoimmune disease ([address] CAD (coronary artery disease); Cancer ([address] Chronic kidney disease; Chronic obstructive pulmonary disease ([address] Coagulation disorder ([address] Diabetes ([address] Difficult intubation; Endocarditis; Heart failure ([address]  Hypertension; Malignant hyperthermia due to anesthesia; Nausea & vomiting; Nicotine vapor product user; Non-nicotine vapor product user; Other unknown and unspecified cause of morbidity or mortality; Pseudocholinesterase deficiency; Psychiatric disorder; PUD (peptic ulcer disease); Rheumatic fever; Seizures ([facility] [address] Stroke [facility]); [facility]olus ([facility] Thyroid disease; or Unspecified sleep apnea. Ms. Maddy Acosta  has a past surgical history that includes heent; tonsillectomy (1961); lap cholecystectomy; carpal tunnel release (Left); carpal tunnel release (Right); orthopaedic (Left); orthopaedic (Left); hysterectomy; knee arthroscopy (Right); bunionectomy (Right); and rotator cuff repair (Right). Social History/Living Environment:   Home Environment: Private residence  # Steps to Enter: 3  Rails to Enter: No  One/Two Story Residence: Two story, live on 1st floor  # of Interior Steps: 13  Interior Rails: Left  Living Alone: Yes  Support Systems: Child(scott)  Patient Expects to be Discharged to[de-identified] Private residence  Current DME Used/Available at Home: Walker, rolling  Tub or Shower Type: Tub/Shower combination  Prior Level of Function/Work/Activity:  Pt was independent without an assistive device prior to this admission   Number of Personal Factors/Comorbidities that affect the Plan of Care: 3+: HIGH COMPLEXITY   EXAMINATION:   Most Recent Physical Functioning:                            Bed Mobility  Supine to Sit: Contact guard assistance  Sit to Supine:  (up in chair)  Scooting: Stand-by asssistance    Transfers  Sit to Stand: Contact guard assistance  Stand to Sit: Contact guard assistance  Bed to Chair: Contact guard assistance    Balance  Sitting: Intact  Standing: Pull to stand; With support              Weight Bearing Status  Right Side Weight Bearing: As tolerated  Distance (ft): 60 Feet (ft)  Ambulation - Level of Assistance: Contact guard assistance  Assistive Device: Walker, rolling  Speed/Francia: Pace decreased (<100 feet/min)  Step Length: Left shortened  Stance: Right decreased  Gait Abnormalities: Antalgic;Decreased step clearance; Step to gait        Braces/Orthotics: none    Right Knee Cold  Type: Cryocuff      Body Structures Involved:  1. Joints  2. Muscles Body Functions Affected:  1. Sensory/Pain  2. Movement Related Activities and Participation Affected:  1. General Tasks and Demands  2. Mobility   Number of elements that affect the Plan of Care: 4+: HIGH COMPLEXITY   CLINICAL PRESENTATION:   Presentation: Stable and uncomplicated: LOW COMPLEXITY   CLINICAL DECISION MAKIN[address] [place] [address] AM-PAC 6 Clicks   Basic Mobility Inpatient Short Form  How much difficulty does the patient currently have. .. Unable A Lot A Little None   1. Turning over in bed (including adjusting bedclothes, sheets and blankets)? [] 1   [] 2   [x] 3   [] 4   2. Sitting down on and standing up from a chair with arms ( e.g., wheelchair, bedside commode, etc.)   [] 1   [] 2   [x] 3   [] 4   3. Moving from lying on back to sitting on the side of the bed? [] 1   [] 2   [x] 3   [] 4   How much help from another person does the patient currently need. .. Total A Lot A Little None   4. Moving to and from a bed to a chair (including a wheelchair)? [] 1   [] 2   [x] 3   [] 4   5. Need to walk in hospital room? [] 1   [] 2   [x] 3   [] 4   6. Climbing 3-5 steps with a railing? [x] 1   [] 2   [] 3   [] 4   © , Trustees of [address] [place] [address], under license to [employer]. All rights reserved        Score:  Initial: 16 Most Recent: X (Date: -- )    Interpretation of Tool:  Represents activities that are increasingly more difficult (i.e. Bed mobility, Transfers, Gait). Score 24 23 22-20 19-15 14-10 9-7 6     Modifier CH CI CJ CK CL CM CN      ?  Mobility - Walking and Moving Around:     - CURRENT STATUS: CK - 40%-59% impaired, limited or restricted    - GOAL STATUS: CJ - 20%-39% impaired, limited or restricted    - D/C STATUS:  ---------------To be determined---------------  [place] / Product Type: Medicare /      Medical Necessity:     · Patient is expected to demonstrate progress in strength, range of motion, balance, coordination and functional technique to decrease assistance required with bed mobility, transfers & gait. Reason for Services/Other Comments:  · Patient continues to require skilled intervention due to pt not safe with functional mobility. Use of outcome tool(s) and clinical judgement create a POC that gives a: Clear prediction of patient's progress: LOW COMPLEXITY            TREATMENT:   (In addition to Assessment/Re-Assessment sessions the following treatments were rendered)     Pre-treatment Symptoms/Complaints:  nausea  Pain: Initial: visual scale     Post Session:  3/10     Gait Training (10 Minutes):  Gait training to improve and/or restore physical functioning as related to mobility and strength. Ambulated 60 Feet (ft) with Contact guard assistance using a Walker, rolling and minimal  verbal cues related to their stance phase to promote proper body posture. Therapeutic Exercise: (15 Minutes):  Exercises per grid below to improve mobility and strength. Required minimal verbal cues to promote proper body alignment. Date:  11/16 Date:   Date:     ACTIVITY/EXERCISE AM PM AM PM AM PM   GROUP THERAPY  []  []  []  []  []  []   Ankle Pumps 10        Quad Sets 10        Gluteal Sets 10        Hip ABd/ADduction 10        Straight Leg Raises 10        Knee Slides 10        Short Arc Quads         Long Arc Quads         Chair Slides                  B = bilateral; AA = active assistive; A = active; P = passive      Treatment/Session Assessment:     Response to Treatment:  Tolerated well.     Education:  [x] Home Exercises  [x] Fall Precautions  [] Hip Precautions [] D/C Instruction Review  [] Knee/Hip Prosthesis Review  [x] Walker Management/Safety [] Adaptive Equipment as Needed       Interdisciplinary Collaboration:   o Physical Therapist  o Registered Nurse    After treatment position/precautions:   o Up in chair  o Bed/Chair-wheels locked  o Call light within reach  o RN notified    Compliance with Program/Exercises: Will assess as treatment progresses. Recommendations/Intent for next treatment session:  Treatment next visit will focus on increasing Ms. Catrachito Situ independence with bed mobility, transfers, gait training, strength/ROM exercises, modalities for pain, and patient education.       Total Treatment Duration:  PT Patient Time In/Time Out  Time In: 0920  Time Out: [address]

## 2017-11-16 NOTE — PROGRESS NOTES
Patient is A&Ox4. Able to verbalize needs. Resting quietly with no distress noted. Shift assessment completed. Dressing to surgical site is dry and intact. Neurovascular and peripheral vascular checks WNL. Rodriguez draining clear yellow urine to bag. PIV infusing without difficulty. PIV dressing intact with no s/s of infection. Iceman applied to knee. Denies needs. Bed low and locked. Call light within reach. Instructed to call for assistance. Patient verbalizes understanding. Will continue to monitor.

## 2017-11-16 NOTE — PROGRESS NOTES
Post op interview conducted following total right knee arthroplasty dated 11/15/17, no anesthetic complications noted

## 2017-11-16 NOTE — PROGRESS NOTES
2017         Post Op day: 1 Day Post-Op     Admit Date: 11/15/2017  Admit Diagnosis: Primary osteoarthritis of right knee [M17.11]        Subjective: Patient doing well. No concerns/complaints. No shortness of breath, chest pain or nausea/vomiting. Objective:   Visit Vitals    /59    Pulse 74    Temp 97.6 °F (36.4 °C)    Resp 16    Ht 5' 5.5\" (1.664 m)    Wt 106.1 kg (234 lb)    SpO2 98%    BMI 38.35 kg/m2    Temp (24hrs), Av.1 °F (36.7 °C), Min:97.6 °F (36.4 °C), Max:99.2 °F (37.3 °C)    Lab Results   Component Value Date/Time    HGB 11.1 2017 03:48 AM     Extremity Exam  Dressing Clean, dry   +Tibialis Anterior and Gastroc-Soleus right lower extremity  Sensation intact to light touch  Extremity perfused  TEDS/SCDS  No sign of DVT     Assessment / Plan :  WBAT RLE  PT/OT  ASA along with SCDs/TEDS for DVT prophylaxis in house. Discharge on ASA BID  DIspo-Home with [facility]  Patient Active Problem List   Diagnosis Code    Superior glenoid labrum lesion S43.439A    Supraspinatus (muscle) (tendon) sprain S46.819A    Infraspinatus (muscle) (tendon) sprain S46.919A    Bicipital tenosynovitis M75.20    Primary localized osteoarthrosis, shoulder region M19.019    OA (osteoarthritis) of knee M17.10          Signed By: Carolee Gilford, MD     I have reviewed the patients controlled substance prescription history, as maintained in the [place] prescription monitoring program, so that the prescription(s) for a  controlled substance can be given.

## 2017-11-17 VITALS
DIASTOLIC BLOOD PRESSURE: 68 MMHG | HEART RATE: 78 BPM | RESPIRATION RATE: 16 BRPM | SYSTOLIC BLOOD PRESSURE: 103 MMHG | HEIGHT: 66 IN | WEIGHT: 234 LBS | BODY MASS INDEX: 37.61 KG/M2 | OXYGEN SATURATION: 94 % | TEMPERATURE: 98 F

## 2017-11-17 LAB — HGB BLD-MCNC: 10.9 G/DL (ref 11.7–15.4)

## 2017-11-17 PROCEDURE — 85018 HEMOGLOBIN: CPT | Performed by: ORTHOPAEDIC SURGERY

## 2017-11-17 PROCEDURE — 97116 GAIT TRAINING THERAPY: CPT

## 2017-11-17 PROCEDURE — 36415 COLL VENOUS BLD VENIPUNCTURE: CPT | Performed by: ORTHOPAEDIC SURGERY

## 2017-11-17 PROCEDURE — 74011250637 HC RX REV CODE- 250/637: Performed by: ORTHOPAEDIC SURGERY

## 2017-11-17 PROCEDURE — 97150 GROUP THERAPEUTIC PROCEDURES: CPT

## 2017-11-17 RX ADMIN — OXYCODONE HYDROCHLORIDE AND ACETAMINOPHEN 500 MG: 500 TABLET ORAL at 08:42

## 2017-11-17 RX ADMIN — HYDROMORPHONE HYDROCHLORIDE 2 MG: 2 TABLET ORAL at 01:17

## 2017-11-17 RX ADMIN — CELECOXIB 200 MG: 200 CAPSULE ORAL at 08:42

## 2017-11-17 RX ADMIN — TRANEXAMIC ACID 1300 MG: 650 TABLET, FILM COATED ORAL at 08:43

## 2017-11-17 RX ADMIN — CYCLOBENZAPRINE HYDROCHLORIDE 5 MG: 10 TABLET, FILM COATED ORAL at 08:42

## 2017-11-17 RX ADMIN — SENNOSIDES AND DOCUSATE SODIUM 2 TABLET: 8.6; 5 TABLET ORAL at 08:42

## 2017-11-17 RX ADMIN — ACETAMINOPHEN 1000 MG: 500 TABLET, FILM COATED ORAL at 04:38

## 2017-11-17 RX ADMIN — ASPIRIN 325 MG: 325 TABLET, DELAYED RELEASE ORAL at 08:42

## 2017-11-17 RX ADMIN — Medication 400 MG: at 08:42

## 2017-11-17 RX ADMIN — HYDROMORPHONE HYDROCHLORIDE 2 MG: 2 TABLET ORAL at 08:42

## 2017-11-17 RX ADMIN — OXYCODONE HYDROCHLORIDE 10 MG: 10 TABLET, FILM COATED, EXTENDED RELEASE ORAL at 08:42

## 2017-11-17 RX ADMIN — ACETAMINOPHEN 1000 MG: 500 TABLET, FILM COATED ORAL at 01:17

## 2017-11-17 RX ADMIN — HYDROMORPHONE HYDROCHLORIDE 2 MG: 2 TABLET ORAL at 11:24

## 2017-11-17 RX ADMIN — MULTIPLE VITAMINS W/ MINERALS TAB 1 TABLET: TAB at 08:42

## 2017-11-17 RX ADMIN — ACETAMINOPHEN 1000 MG: 500 TABLET, FILM COATED ORAL at 11:24

## 2017-11-17 RX ADMIN — GABAPENTIN 100 MG: 100 CAPSULE ORAL at 08:42

## 2017-11-17 RX ADMIN — HYDROMORPHONE HYDROCHLORIDE 2 MG: 2 TABLET ORAL at 04:38

## 2017-11-17 NOTE — PROGRESS NOTES
Discharge instructions reviewed, prescriptions given to pt. Pt taken down via wheelchair, home with friend.

## 2017-11-17 NOTE — PROGRESS NOTES
Problem: Mobility Impaired (Adult and Pediatric)  Goal: *Acute Goals and Plan of Care (Insert Text)  GOALS (1-4 days):  (1.)Ms. Libby Mares will move from supine to sit and sit to supine  in bed with SUPERVISION. (2.)Ms. Libby Mares will transfer from bed to chair and chair to bed with STAND BY ASSIST using the least restrictive device. Goal met  (3.)Ms. Libby Mares will ambulate with STAND BY ASSIST for 200 feet with the least restrictive device. Met 11/17  (4.)Ms. Libby Mares will ambulate up/down 3 steps with no railing with MINIMAL ASSIST with no device. Met 11/17  (5.)Ms. Libby Mares will increase right knee ROM to 5°-80°.  ________________________________________________________________________________________________    PHYSICAL THERAPY [facility]: Daily Note, Treatment Day: 2nd, AM 11/17/2017  INPATIENT: Hospital Day: 3  [place] / Product Type: Medicare /      NAME/AGE/GENDER: Carmen Mckinney is a 78 y.o. female   PRIMARY DIAGNOSIS:  Primary osteoarthritis of right knee [M17.11]   Procedure(s) and Anesthesia Type:     * RIGHT KNEE ARTHROPLASTY TOTAL / RISSA - Spinal (Right)  ICD-10: Treatment Diagnosis:    · Pain in Right Knee (M25.561)  · Stiffness of Right Knee, Not elsewhere classified (M25.661)  · Difficulty in walking, Not elsewhere classified (R26.2)      ASSESSMENT:     Ms. Libby Mares showed increased gait distance & improved level of assist for transfers & gait in am session. This section established at most recent assessment   PROBLEM LIST (Impairments causing functional limitations):  1. Decreased Strength  2. Decreased ADL/Functional Activities  3. Decreased Transfer Abilities  4. Decreased Ambulation Ability/Technique  5. Decreased Balance  6. Increased Pain  7. Decreased Activity Tolerance  8. Decreased Flexibility/Joint Mobility  9.  Decreased [place] with Home Exercise Program   INTERVENTIONS PLANNED: (Benefits and precautions of physical therapy have been discussed with the patient.)  1. Bed Mobility  2. Gait Training  3. Home Exercise Program (HEP)  4. Therapeutic Exercise/Strengthening  5. Transfer Training  6. Range of Motion: active/assisted/passive  7. Therapeutic Activities  8. Group Therapy     TREATMENT PLAN: Frequency/Duration: Follow patient BID   to address above goals. Rehabilitation Potential For Stated Goals: Good     RECOMMENDED REHABILITATION/EQUIPMENT: (at time of discharge pending progress): Continue Skilled Therapy and Home Health: Physical Therapy. HISTORY:   History of Present Injury/Illness (Reason for Referral): The patient has end stage arthritis of the right knee. The patient was evaluated and examined during a consultation prior to today. The patient complains of knee pain with activities, reports pain as mostly occurring along the joint lines, reports stiffness of the knee with prolonged inactivity, and swelling/pain at the end of the day and after increased physical activity. The pain affects the patients activities of daily living and quality of life. The patient has attempted and exhausted conservative treatment. There have been no changes to the patient's orthopedic condition since the last office visit. Past Medical History/Comorbidities:   Ms. Libby Mares  has a past medical history of Arthritis; BMI 38.0-38.9,adult; Chronic pain; GERD (gastroesophageal reflux disease); Insomnia; and Liver disease. She also has no past medical history of Adverse effect of anesthesia; Aneurysm ([address] Arrhythmia; Asthma; Autoimmune disease ([address] CAD (coronary artery disease); Cancer ([address] Chronic kidney disease; Chronic obstructive pulmonary disease ([address] Coagulation disorder ([address] Diabetes ([address] Difficult intubation; Endocarditis; Heart failure ([address] Hypertension; Malignant hyperthermia due to anesthesia; Nausea & vomiting; Nicotine vapor product user; Non-nicotine vapor product user;  Other unknown and unspecified cause of morbidity or mortality; Pseudocholinesterase deficiency; Psychiatric disorder; PUD (peptic ulcer disease); Rheumatic fever; Seizures ([facility] Stroke [facility]); Thromboembolus ([facility] Thyroid disease; or Unspecified sleep apnea. Ms. Onel Roque  has a past surgical history that includes heent; tonsillectomy (1961); lap cholecystectomy; carpal tunnel release (Left); carpal tunnel release (Right); orthopaedic (Left); orthopaedic (Left); hysterectomy; knee arthroscopy (Right); bunionectomy (Right); and rotator cuff repair (Right). Social History/Living Environment:   Home Environment: Private residence  # Steps to Enter: 3  Rails to Enter: No  One/Two Story Residence: Two story, live on 1st floor  # of Interior Steps: 13  Interior Rails: Left  Living Alone: No  Support Systems: Child(scott)  Patient Expects to be Discharged to[de-identified] Private residence  Current DME Used/Available at Home: Walker, rolling  Tub or Shower Type: Tub/Shower combination  Prior Level of Function/Work/Activity:  Pt was independent without an assistive device prior to this admission   Number of Personal Factors/Comorbidities that affect the Plan of Care: 3+: HIGH COMPLEXITY   EXAMINATION:   Most Recent Physical Functioning:                RLE PROM  R Knee Flexion: 82  R Knee Extension: -7           Bed Mobility  Supine to Sit:  (NT)  Sit to Supine:  (NT)  Scooting: Stand-by asssistance    Transfers  Sit to Stand: Stand-by asssistance  Stand to Sit: Stand-by asssistance  Bed to Chair: Stand-by asssistance (with walker)    Balance  Sitting: Intact; Without support  Standing: Impaired; With support    (walker)           Weight Bearing Status  Right Side Weight Bearing: As tolerated  Distance (ft): 356 Feet (ft)  Ambulation - Level of Assistance: Stand-by asssistance  Assistive Device: Walker, rolling  Speed/Francia: Delayed  Step Length: Left shortened  Stance: Right decreased  Gait Abnormalities: Antalgic;Decreased step clearance  Number of Stairs Trained: 3  Stairs - Level of Assistance: Minimum assistance  Rail Use: None     Braces/Orthotics: none    Right Knee Cold  Type: Cryocuff      Body Structures Involved:  1. Joints  2. Muscles Body Functions Affected:  1. Sensory/Pain  2. Movement Related Activities and Participation Affected:  1. General Tasks and Demands  2. Mobility   Number of elements that affect the Plan of Care: 4+: HIGH COMPLEXITY   CLINICAL PRESENTATION:   Presentation: Stable and uncomplicated: LOW COMPLEXITY   CLINICAL DECISION MAKIN[address] [place] [address] AM-PAC 6 Clicks   Basic Mobility Inpatient Short Form  How much difficulty does the patient currently have. .. Unable A Lot A Little None   1. Turning over in bed (including adjusting bedclothes, sheets and blankets)? [] 1   [] 2   [x] 3   [] 4   2. Sitting down on and standing up from a chair with arms ( e.g., wheelchair, bedside commode, etc.)   [] 1   [] 2   [x] 3   [] 4   3. Moving from lying on back to sitting on the side of the bed? [] 1   [] 2   [x] 3   [] 4   How much help from another person does the patient currently need. .. Total A Lot A Little None   4. Moving to and from a bed to a chair (including a wheelchair)? [] 1   [] 2   [x] 3   [] 4   5. Need to walk in hospital room? [] 1   [] 2   [x] 3   [] 4   6. Climbing 3-5 steps with a railing? [x] 1   [] 2   [] 3   [] 4   © , Trustees of [address] [place] [address], under license to [employer]. All rights reserved        Score:  Initial: 16 Most Recent: X (Date: -- )    Interpretation of Tool:  Represents activities that are increasingly more difficult (i.e. Bed mobility, Transfers, Gait). Score 24 23 22-20 19-15 14-10 9-7 6     Modifier CH CI CJ CK CL CM CN      ?  Mobility - Walking and Moving Around:     - CURRENT STATUS: CK - 40%-59% impaired, limited or restricted    - GOAL STATUS: CJ - 20%-39% impaired, limited or restricted    - D/C STATUS:  ---------------To be determined---------------  [place] / Product Type: Medicare /      Medical Necessity:     · Patient is expected to demonstrate progress in strength, range of motion, balance, coordination and functional technique to decrease assistance required with bed mobility, transfers & gait. Reason for Services/Other Comments:  · Patient continues to require skilled intervention due to pt not safe with functional mobility. Use of outcome tool(s) and clinical judgement create a POC that gives a: Clear prediction of patient's progress: LOW COMPLEXITY            TREATMENT:   (In addition to Assessment/Re-Assessment sessions the following treatments were rendered)     Pre-treatment Symptoms/Complaints:  Pt eager to return home  Pain: Initial: visual scale  Pain Intensity 1: 4  Pain Location 1: Knee  Pain Orientation 1: Right  Pain Intervention(s) 1: Cold pack, Repositioned  Post Session:  4/10     Gait Training (15 Minutes):  Gait training to improve and/or restore physical functioning as related to mobility and strength. Ambulated 356 Feet (ft) with Stand-by asssistance using a Walker, rolling and minimal  verbal cues related to their stance phase to promote proper body posture. Therapeutic Exercise: (45 Minutes (group)):  Exercises per grid below to improve mobility and strength. Required minimal verbal cues to promote proper body alignment. Date:  11/16 Date:  11/17 Date:     ACTIVITY/EXERCISE AM PM AM PM AM PM   GROUP THERAPY  []  [x]  [x]  []  []  []   Ankle Pumps 10 15 20      Quad Sets 10 15 20      Gluteal Sets 10 15 20      Hip ABd/ADduction 10 15 20      Straight Leg Raises 10 15 20aa      Knee Slides 10 15 20aa      Short Arc Quads  15 20      Long Arc Quads         Chair Slides  15 20               B = bilateral; AA = active assistive; A = active; P = passive      Treatment/Session Assessment:     Response to Treatment:  Tolerated well.     Education:  [x] Home Exercises  [x] Fall Precautions  [] Hip Precautions [x] D/C Instruction Review  [] Knee/Hip Prosthesis Review  [x] Walker Management/Safety [] Adaptive Equipment as Needed       Interdisciplinary Collaboration:   o Registered Nurse    After treatment position/precautions:   o Up in chair  o Bed/Chair-wheels locked  o Call light within reach  o RN notified    Compliance with Program/Exercises: Will assess as treatment progresses. Recommendations/Intent for next treatment session:  Treatment next visit will focus on increasing Ms. Rashad Alvarado independence with bed mobility, transfers, gait training, strength/ROM exercises, modalities for pain, and patient education.       Total Treatment Duration:  PT Patient Time In/Time Out  Time In: 1030  Time Out: 1130    [facility], PT

## 2017-11-17 NOTE — PROGRESS NOTES
Care Management Interventions  Mode of Transport at Discharge: Self  Transition of Care Consult (CM Consult): [address]: Yes  Discharge Durable Medical Equipment: Yes  Physical Therapy Consult: Yes  Occupational Therapy Consult: Yes  Current Support Network: Own Home  Confirm Follow Up Transport: Family  Plan discussed with Pt/Family/Caregiver: Yes  Freedom of Choice Offered: Yes  Discharge Location  Discharge Placement: Home with home health    Patient is a 78y.o. year old female admitted for Right TKA . Patient lives with Her spouse and plans to return home on discharge. Order received to arrange home health. Patient without preference towards agency. Referral sent to [facility]. Patient has a standard walker. She declined offer to arrange a walker with wheels. Patient requesting we arrange a bedside commode. Referral sent to [facility] who will deliver to the hospital room prior to discharge. Will follow until discharge.   Ying Crowe

## 2017-11-17 NOTE — PROGRESS NOTES
[facility] - INPATIENT  Face to Face Encounter    Patients Name: Karel Samuels    YOB: 1950    Ordering Physician: Travis Hyman    Primary Diagnosis: Primary osteoarthritis of right knee [M17.11]  S/p right TKA    Date of Face to Face:   11-15-17                                 Face to Face Encounter findings are related to primary reason for home care:   yes. 1. I certify that the patient needs intermittent care as follows: physical therapy: gait/stair training    2. I certify that this patient is homebound, that is: 1) patient requires the use of a walker device, special transportation, or assistance of another to leave the home; or 2) patient's condition makes leaving the home medically contraindicated; and 3) patient has a normal inability to leave the home and leaving the home requires considerable and taxing effort. Patient may leave the home for infrequent and short duration for medical reasons, and occasional absences for non-medical reasons. Homebound status is due to the following functional limitations: Patient's ambulation limited secondary to severe pain and requires the use of an assistive device and the assistance of a caregiver for safe completion. Patient with strength and ROM deficits limiting ambulation endurance requiring the use of an assistive device and the assistance of a caregiver. Patient deemed temporarily homebound secondary to increased risk for infection when leaving home and going out into the community. 3. I certify that this patient is under my care and that I, or a nurse practitioner or  [id number], or clinical nurse specialist, or certified nurse midwife, working with me, had a Face-to-Face Encounter that meets the physician Face-to-Face Encounter requirements.   The following are the clinical findings from the [address] [place] [address] encounter that support the need for skilled services and is a summary of the encounter: see hospital chart        Peg Guzman ARJUN  11/17/2017      THE FOLLOWING TO BE COMPLETED BY THE COMMUNITY PHYSICIAN:    I concur with the findings described above from the F2F encounter that this patient is homebound and in need of a skilled service.     Certifying Physician: _____________________________________      Printed Certifying Physician Name: _____________________________________    Date: _________________

## 2017-11-17 NOTE — PROGRESS NOTES
2017         Post Op day: 2 Days Post-Op     Admit Date: 11/15/2017  Admit Diagnosis: Primary osteoarthritis of right knee [M17.11]        Subjective: Patient doing well. No concerns/complaints. No shortness of breath, chest pain or nausea/vomiting. Did well with PT yesterday. Objective:   Visit Vitals    /68 (BP 1 Location: Right arm, BP Patient Position: At rest)    Pulse 78    Temp 98 °F (36.7 °C)    Resp 16    Ht 5' 5.5\" (1.664 m)    Wt 106.1 kg (234 lb)    SpO2 94%    BMI 38.35 kg/m2    Temp (24hrs), Av.9 °F (36.6 °C), Min:97.3 °F (36.3 °C), Max:98.1 °F (36.7 °C)    Lab Results   Component Value Date/Time    HGB 10.9 2017 04:46 AM     Extremity Exam  Dressing Clean, dry   +Tibialis Anterior and Gastroc-Soleus right lower extremity  Sensation intact to light touch  Extremity perfused  TEDS/SCDS  No sign of DVT     Assessment / Plan :  WBAT RLE  PT/OT  ASA along with SCDs/TEDS for DVT prophylaxis in house. Discharge on ASA BID  [facility]sp[facility]-Home with [facility]  Patient Active Problem List   Diagnosis Code    Superior glenoid labrum lesion S43.439A    Supraspinatus (muscle) (tendon) sprain S46.819A    Infraspinatus (muscle) (tendon) sprain S46.919A    Bicipital tenosynovitis M75.20    Primary localized osteoarthrosis, shoulder region M19.019    OA (osteoarthritis) of knee M17.10          Signed By: Deng Jenkins MD     I have reviewed the patients controlled substance prescription history, as maintained in the [place] prescription monitoring program, so that the prescription(s) for a  controlled substance can be given.

## 2017-11-17 NOTE — DISCHARGE INSTRUCTIONS
[facility]   Patient Discharge Instructions    Shara Joyner / [id number] : 1950    Admitted 11/15/2017 Discharged: 2017     IF YOU HAVE ANY PROBLEMS ONCE YOU ARE AT HOME CALL THE FOLLOWING NUMBERS:   Main office number: [phone]    Take Home Medications         · It is important that you take the medication exactly as they are prescribed. · Keep your medication in the bottles provided by the pharmacist and keep a list of the medication names, dosages, and times to be taken in your wallet. · Do not take other medications without consulting your doctor. What to do at [address] [place] [address] Ave your prehospital diet. If you have excessive nausea or vomitting call your doctor's office     Home Physical Therapy is arranged. Use rolling walker when walking. Use Josef Hose stockings until we see you in the office for your follow up appointment with Dr. Nidhi Lopez. Patients who have had a joint replacement should not drive until you are seen for your follow up appointment by Dr. Nidhi Lopez. When to Call    - Call if you have a temperature greater then 101  - Unable to keep food down  - Loose control of your bladder or bowel function  - Are unable to bear any weight   - Need a pain medication refill       DISCHARGE SUMMARY from Nurse    The following personal items collected during your admission are returned to you:   Dental Appliance: Dental Appliances: None  Vision: Visual Aid: None  Hearing Aid:   na  Jewelry: Jewelry: None  Clothing: Clothing: Pants, Shirt  Other Valuables:  Other Valuables: Wallet  Valuables sent to safe:   na    PATIENT INSTRUCTIONS:    After general anesthesia or intravenous sedation, for 24 hours or while taking prescription Narcotics:  · Limit your activities  · Do not drive and operate hazardous machinery  · Do not make important personal or business decisions  · Do  not drink alcoholic beverages  · If you have not urinated within 8 hours after discharge, please contact your surgeon on call. Report the following to your surgeon:  · Excessive pain, swelling, redness or odor of or around the surgical area  · Temperature over 101  · Nausea and vomiting lasting longer than 4 hours or if unable to take medications  · Any signs of decreased circulation or nerve impairment to extremity: change in color, persistent  numbness, tingling, coldness or increase pain  · Any questions, call office @ [phone]      Keep scheduled follow up appointment. If need to change, call office @ [phone]. *  Please give a list of your current medications to your Primary Care Provider. *  Please update this list whenever your medications are discontinued, doses are      changed, or new medications (including over-the-counter products) are added. *  Please carry medication information at all times in case of emergency situations. Total Knee Replacement: What to Expect at [address] [place] [address]    When you leave the hospital, you should be able to move around with a walker or crutches. But you will need someone to help you at home for the next few weeks or until you have more energy and can move around better. If there is no one to help you at home, you may go to a rehabilitation center. You will go home with a bandage and stitches or staples. Change the bandage as your doctor tells you to. Your doctor will remove your stitches or staples 10 to 21 days after your surgery. You may still have some mild pain, and the area may be swollen for 3 to 6 months after surgery. Your knee will continue to improve for 6 to 12 months. You will probably use a walker for 1 to 3 weeks and then use crutches. When you are ready, you can use a cane. You will probably be able to walk on your own in 4 to 8 weeks. You will need to do months of physical rehabilitation (rehab) after a knee replacement. Rehab will help you strengthen the muscles of the knee and help you regain movement.  After you recover, your artificial knee will allow you to do normal daily activities with less pain or no pain at all. You may be able to hike, dance, ride a bike, and play golf. Talk to your doctor about whether you can do more strenuous activities. Always tell your caregivers that you have an artificial knee. How long it will take to walk on your own, return to normal activities, and go back to work depends on your health and how well your rehabilitation (rehab) program goes. The better you do with your rehab exercises, the quicker you will get your strength and movement back. This care sheet gives you a general idea about how long it will take for you to recover. But each person recovers at a different pace. Follow the steps below to get better as quickly as possible. How can you care for yourself at home? Activity  ? · Rest when you feel tired. You may take a nap, but do not stay in bed all day. When you sit, use a chair with arms. You can use the arms to help you stand up. ? · Work with your physical therapist to find the best way to exercise. You may be able to take frequent, short walks using crutches or a walker. What you can do as your knee heals will depend on whether your new knee is cemented or uncemented. You may not be able to do certain things for a while if your new knee is uncemented. ? · After your knee has healed enough, you can do more strenuous activities with caution. ¨ You can golf, but use a golf cart, and do not wear shoes with spikes. ¨ You can bike on a flat road or on a stationary bike. Avoid biking up hills. ¨ Your doctor may suggest that you stay away from activities that put stress on your knee. These include tennis or badminton, squash or racquetball, contact sports like football, jumping (such as in basketball), jogging, or running. ¨ Avoid activities where you might fall. These include horseback riding, skiing, and mountain biking. ? · Do not sit for more than 1 hour at a time.  Get up and walk around for a while before you sit again. If you must sit for a long time, prop up your leg with a chair or footstool. This will help you avoid swelling. ? · Ask your doctor when you can shower. You may need to take sponge baths until your stitches or staples have been removed. ? · Ask your doctor when you can drive again. It may take up to 8 weeks after knee replacement surgery before it is safe for you to drive. ? · When you get into a car, sit on the edge of the seat. Then pull in your legs, and turn to face the front. ? · You should be able to do many everyday activities 3 to 6 weeks after your surgery. You will probably need to take 4 to 16 weeks off from work. When you can go back to work depends on the type of work you do and how you feel. ? · Ask your doctor when it is okay for you to have sex. ? · Do not lift anything heavier than 10 pounds and do not lift weights for 12 weeks. Diet  ? · By the time you leave the hospital, you should be eating your normal diet. If your stomach is upset, try bland, low-fat foods like plain rice, broiled chicken, toast, and yogurt. Your doctor may suggest that you take iron and vitamin supplements. ? · Drink plenty of fluids (unless your doctor tells you not to). ? · Eat healthy foods, and watch your portion sizes. Try to stay at your ideal weight. Too much weight puts more stress on your new knee. ? · You may notice that your bowel movements are not regular right after your surgery. This is common. Try to avoid constipation and straining with bowel movements. You may want to take a fiber supplement every day. If you have not had a bowel movement after a couple of days, ask your doctor about taking a mild laxative. Medicines  ? · Your doctor will tell you if and when you can restart your medicines. He or she will also give you instructions about taking any new medicines.    ? · If you take blood thinners, such as warfarin (Coumadin), clopidogrel (Plavix), or aspirin, be sure to talk to your doctor. He or she will tell you if and when to start taking those medicines again. Make sure that you understand exactly what your doctor wants you to do.   ? · Your doctor may give you a blood-thinning medicine to prevent blood clots. If you take a blood thinner, be sure you get instructions about how to take your medicine safely. Blood thinners can cause serious bleeding problems. This medicine could be in pill form or as a shot (injection). If a shot is necessary, your doctor will tell you how to do this. ? · Be safe with medicines. Take pain medicines exactly as directed. ¨ If the doctor gave you a prescription medicine for pain, take it as prescribed. ¨ If you are not taking a prescription pain medicine, ask your doctor if you can take an over-the-counter medicine. ¨ Plan to take your pain medicine 30 minutes before exercises. It is easier to prevent pain before it starts than to stop it once it has started. ? · If you think your pain medicine is making you sick to your stomach:  ¨ Take your medicine after meals (unless your doctor has told you not to). ¨ Ask your doctor for a different pain medicine. ? · If your doctor prescribed antibiotics, take them as directed. Do not stop taking them just because you feel better. You need to take the full course of antibiotics. Incision care  ? · You will have a bandage over the cut (incision) and staples or stitches. Take the bandage off when your doctor says it is okay. ? · Your doctor will remove the staples or stitches 10 days to 3 weeks after the surgery and replace them with strips of tape. Leave the tape on for a week or until it falls off. Exercise  ? · Your rehab program will give you a number of exercises to do to help you get back your knee's range of motion and strength. Always do them as your therapist tells you. Ice and elevation  ?  · For pain and swelling, put ice or a cold pack on the area for 10 to 20 minutes at a time. Put a thin cloth between the ice and your skin. Other instructions  ? · Continue to wear your support stockings as your doctor says. These help to prevent blood clots. The length of time that you will have to wear them depends on your activity level and the amount of swelling. ? · You have metal pieces in your knee. These may set off some airport metal detectors. Carry a medical alert card that says you have an artificial joint, just in case. Follow-up care is a key part of your treatment and safety. Be sure to make and go to all appointments, and call your doctor if you are having problems. It's also a good idea to know your test results and keep a list of the medicines you take. When should you call for help? Call 911 anytime you think you may need emergency care. For example, call if:  ? · You passed out (lost consciousness). ? · You have severe trouble breathing. ? · You have sudden chest pain and shortness of breath, or you cough up blood. ?Call your doctor now or seek immediate medical care if:  ? · You have signs of infection, such as:  ¨ Increased pain, swelling, warmth, or redness. ¨ Red streaks leading from the incision. ¨ Pus draining from the incision. ¨ A fever. ? · You have signs of a blood clot, such as:  ¨ Pain in your calf, back of the knee, thigh, or groin. ¨ Redness and swelling in your leg or groin. ? · Your incision comes open and begins to bleed, or the bleeding increases. ? · You have pain that does not get better after you take pain medicine. ? Watch closely for changes in your health, and be sure to contact your doctor if:  ? · You do not have a bowel movement after taking a laxative. Where can you learn more? Go to http://yanely-barrington.info/. Enter G761 in the search box to learn more about \"Total Knee Replacement: What to Expect at Home. \"  Current as of: March 21, 2017  Content Version: 11.4  © 5128-2025 [employer]. Care instructions adapted under license by [employer] (which disclaims liability or warranty for this information). If you have questions about a medical condition or this instruction, always ask your healthcare professional. [address] any warranty or liability for your use of this information. These are general instructions for a healthy lifestyle:    No smoking/ No tobacco products/ Avoid exposure to second hand smoke    Surgeon General's Warning:  Quitting smoking now greatly reduces serious risk to your health. Obesity, smoking, and sedentary lifestyle greatly increases your risk for illness    A healthy diet, regular physical exercise & weight monitoring are important for maintaining a healthy lifestyle    You may be retaining fluid if you have a history of heart failure or if you experience any of the following symptoms:  Weight gain of 3 pounds or more overnight or 5 pounds in a week, increased swelling in our hands or feet or shortness of breath while lying flat in bed. Please call your doctor as soon as you notice any of these symptoms; do not wait until your next office visit. Recognize signs and symptoms of STROKE:    F-face looks uneven    A-arms unable to move or move even    S-speech slurred or non-existent    T-time-call 911 as soon as signs and symptoms begin-DO NOT go       Back to bed or wait to see if you get better-TIME IS BRAIN. The discharge information has been reviewed with the patient. The patient verbalized understanding. Information obtained by :  I understand that if any problems occur once I am at home I am to contact my physician. I understand and acknowledge receipt of the instructions indicated above.                                                                                                                                            Physician's or R.N.'s Signature Date/Time                                                                                                                                              Patient or Representative Signature                                                          Date/Time

## 2017-11-17 NOTE — PROGRESS NOTES
11/16/17 2033   Oxygen Therapy   O2 Sat (%) 94 %   Pulse via Oximetry 75 beats per minute   O2 Device Room air   O2 Flow Rate (L/min) 0 l/min   Incentive Spirometry Treatment   Actual Volume (ml) 03043 ml   Number of Attempts 1   Patient instructed in the use of incentive spirometry. Good npc.

## 2017-11-18 ENCOUNTER — HOME CARE VISIT (OUTPATIENT)
Dept: SCHEDULING | Facility: HOME HEALTH | Age: 67
End: 2017-11-18
Payer: MEDICARE

## 2017-11-18 PROCEDURE — 400013 HH SOC

## 2017-11-18 PROCEDURE — 3331090001 HH PPS REVENUE CREDIT

## 2017-11-18 PROCEDURE — G0151 HHCP-SERV OF PT,EA 15 MIN: HCPCS

## 2017-11-18 PROCEDURE — 3331090002 HH PPS REVENUE DEBIT

## 2017-11-19 VITALS
HEART RATE: 73 BPM | DIASTOLIC BLOOD PRESSURE: 78 MMHG | TEMPERATURE: 96.7 F | OXYGEN SATURATION: 97 % | SYSTOLIC BLOOD PRESSURE: 126 MMHG | RESPIRATION RATE: 17 BRPM

## 2017-11-19 PROCEDURE — 3331090002 HH PPS REVENUE DEBIT

## 2017-11-19 PROCEDURE — 3331090001 HH PPS REVENUE CREDIT

## 2017-11-20 PROCEDURE — 3331090002 HH PPS REVENUE DEBIT

## 2017-11-20 PROCEDURE — 3331090001 HH PPS REVENUE CREDIT

## 2017-11-21 ENCOUNTER — HOME CARE VISIT (OUTPATIENT)
Dept: SCHEDULING | Facility: HOME HEALTH | Age: 67
End: 2017-11-21
Payer: MEDICARE

## 2017-11-21 VITALS
SYSTOLIC BLOOD PRESSURE: 136 MMHG | RESPIRATION RATE: 16 BRPM | TEMPERATURE: 96.6 F | HEART RATE: 77 BPM | DIASTOLIC BLOOD PRESSURE: 82 MMHG

## 2017-11-21 PROCEDURE — G0157 HHC PT ASSISTANT EA 15: HCPCS

## 2017-11-21 PROCEDURE — 3331090002 HH PPS REVENUE DEBIT

## 2017-11-21 PROCEDURE — 3331090001 HH PPS REVENUE CREDIT

## 2017-11-22 PROCEDURE — 3331090002 HH PPS REVENUE DEBIT

## 2017-11-22 PROCEDURE — 3331090001 HH PPS REVENUE CREDIT

## 2017-11-23 PROCEDURE — 3331090002 HH PPS REVENUE DEBIT

## 2017-11-23 PROCEDURE — 3331090001 HH PPS REVENUE CREDIT

## 2017-11-24 ENCOUNTER — HOME CARE VISIT (OUTPATIENT)
Dept: SCHEDULING | Facility: HOME HEALTH | Age: 67
End: 2017-11-24
Payer: MEDICARE

## 2017-11-24 VITALS
RESPIRATION RATE: 16 BRPM | DIASTOLIC BLOOD PRESSURE: 80 MMHG | TEMPERATURE: 96.6 F | SYSTOLIC BLOOD PRESSURE: 142 MMHG | HEART RATE: 80 BPM

## 2017-11-24 PROCEDURE — G0157 HHC PT ASSISTANT EA 15: HCPCS

## 2017-11-24 PROCEDURE — 3331090002 HH PPS REVENUE DEBIT

## 2017-11-24 PROCEDURE — 3331090001 HH PPS REVENUE CREDIT

## 2017-11-25 PROCEDURE — 3331090001 HH PPS REVENUE CREDIT

## 2017-11-25 PROCEDURE — 3331090002 HH PPS REVENUE DEBIT

## 2017-11-26 PROCEDURE — 3331090001 HH PPS REVENUE CREDIT

## 2017-11-26 PROCEDURE — 3331090002 HH PPS REVENUE DEBIT

## 2017-11-27 ENCOUNTER — HOME CARE VISIT (OUTPATIENT)
Dept: HOME HEALTH SERVICES | Facility: HOME HEALTH | Age: 67
End: 2017-11-27
Payer: MEDICARE

## 2017-11-27 ENCOUNTER — HOME CARE VISIT (OUTPATIENT)
Dept: SCHEDULING | Facility: HOME HEALTH | Age: 67
End: 2017-11-27
Payer: MEDICARE

## 2017-11-27 VITALS
RESPIRATION RATE: 16 BRPM | SYSTOLIC BLOOD PRESSURE: 142 MMHG | HEART RATE: 64 BPM | TEMPERATURE: 96.3 F | DIASTOLIC BLOOD PRESSURE: 82 MMHG

## 2017-11-27 PROCEDURE — 3331090002 HH PPS REVENUE DEBIT

## 2017-11-27 PROCEDURE — G0157 HHC PT ASSISTANT EA 15: HCPCS

## 2017-11-27 PROCEDURE — 3331090001 HH PPS REVENUE CREDIT

## 2017-11-28 PROCEDURE — 3331090002 HH PPS REVENUE DEBIT

## 2017-11-28 PROCEDURE — 3331090001 HH PPS REVENUE CREDIT

## 2017-11-29 PROCEDURE — 3331090001 HH PPS REVENUE CREDIT

## 2017-11-29 PROCEDURE — 3331090002 HH PPS REVENUE DEBIT

## 2017-11-30 ENCOUNTER — HOME CARE VISIT (OUTPATIENT)
Dept: SCHEDULING | Facility: HOME HEALTH | Age: 67
End: 2017-11-30
Payer: MEDICARE

## 2017-11-30 VITALS
HEART RATE: 82 BPM | SYSTOLIC BLOOD PRESSURE: 128 MMHG | TEMPERATURE: 97.1 F | RESPIRATION RATE: 16 BRPM | DIASTOLIC BLOOD PRESSURE: 76 MMHG

## 2017-11-30 PROCEDURE — 3331090002 HH PPS REVENUE DEBIT

## 2017-11-30 PROCEDURE — G0157 HHC PT ASSISTANT EA 15: HCPCS

## 2017-11-30 PROCEDURE — 3331090001 HH PPS REVENUE CREDIT

## 2017-12-01 PROCEDURE — 3331090001 HH PPS REVENUE CREDIT

## 2017-12-01 PROCEDURE — 3331090002 HH PPS REVENUE DEBIT

## 2017-12-02 PROCEDURE — 3331090002 HH PPS REVENUE DEBIT

## 2017-12-02 PROCEDURE — 3331090001 HH PPS REVENUE CREDIT

## 2017-12-03 PROCEDURE — 3331090001 HH PPS REVENUE CREDIT

## 2017-12-03 PROCEDURE — 3331090002 HH PPS REVENUE DEBIT

## 2017-12-04 PROCEDURE — 3331090002 HH PPS REVENUE DEBIT

## 2017-12-04 PROCEDURE — 3331090001 HH PPS REVENUE CREDIT

## 2017-12-05 ENCOUNTER — HOME CARE VISIT (OUTPATIENT)
Dept: SCHEDULING | Facility: HOME HEALTH | Age: 67
End: 2017-12-05
Payer: MEDICARE

## 2017-12-05 VITALS
HEART RATE: 78 BPM | DIASTOLIC BLOOD PRESSURE: 70 MMHG | TEMPERATURE: 97 F | RESPIRATION RATE: 16 BRPM | SYSTOLIC BLOOD PRESSURE: 116 MMHG

## 2017-12-05 PROCEDURE — 3331090002 HH PPS REVENUE DEBIT

## 2017-12-05 PROCEDURE — 3331090001 HH PPS REVENUE CREDIT

## 2017-12-05 PROCEDURE — G0157 HHC PT ASSISTANT EA 15: HCPCS

## 2017-12-06 PROCEDURE — 3331090001 HH PPS REVENUE CREDIT

## 2017-12-06 PROCEDURE — 3331090002 HH PPS REVENUE DEBIT

## 2017-12-07 ENCOUNTER — HOME CARE VISIT (OUTPATIENT)
Dept: SCHEDULING | Facility: HOME HEALTH | Age: 67
End: 2017-12-07
Payer: MEDICARE

## 2017-12-07 VITALS
SYSTOLIC BLOOD PRESSURE: 126 MMHG | RESPIRATION RATE: 16 BRPM | OXYGEN SATURATION: 97 % | HEART RATE: 74 BPM | DIASTOLIC BLOOD PRESSURE: 76 MMHG

## 2017-12-07 PROCEDURE — G0151 HHCP-SERV OF PT,EA 15 MIN: HCPCS

## 2017-12-07 PROCEDURE — 3331090002 HH PPS REVENUE DEBIT

## 2017-12-07 PROCEDURE — 3331090001 HH PPS REVENUE CREDIT

## 2017-12-08 PROCEDURE — 3331090002 HH PPS REVENUE DEBIT

## 2017-12-08 PROCEDURE — 3331090001 HH PPS REVENUE CREDIT

## 2017-12-09 PROCEDURE — 3331090001 HH PPS REVENUE CREDIT

## 2017-12-09 PROCEDURE — 3331090002 HH PPS REVENUE DEBIT

## 2017-12-10 PROCEDURE — 3331090001 HH PPS REVENUE CREDIT

## 2017-12-10 PROCEDURE — 3331090002 HH PPS REVENUE DEBIT

## 2017-12-11 ENCOUNTER — HOSPITAL ENCOUNTER (OUTPATIENT)
Dept: PHYSICAL THERAPY | Age: 67
Discharge: HOME | End: 2017-12-11
Payer: MEDICARE

## 2017-12-11 PROCEDURE — 97162 PT EVAL MOD COMPLEX 30 MIN: CPT

## 2017-12-11 PROCEDURE — G8979 MOBILITY GOAL STATUS: HCPCS

## 2017-12-11 PROCEDURE — 97110 THERAPEUTIC EXERCISES: CPT

## 2017-12-11 PROCEDURE — G8978 MOBILITY CURRENT STATUS: HCPCS

## 2017-12-11 PROCEDURE — 3331090001 HH PPS REVENUE CREDIT

## 2017-12-11 PROCEDURE — 3331090002 HH PPS REVENUE DEBIT

## 2017-12-11 NOTE — PROGRESS NOTES
Ambulatory/Rehab Services H2 Model Falls Risk Assessment    Risk Factor Pts. ·   Confusion/Disorientation/Impulsivity  []    4 ·   Symptomatic Depression  []   2 ·   Altered Elimination  []   1 ·   Dizziness/Vertigo  []   1 ·   Gender (Male)  []   1 ·   Any administered antiepileptics (anticonvulsants):  []   2 ·   Any administered benzodiazepines:  []   1 ·   Visual Impairment (specify):  []   1 ·   Portable Oxygen Use  []   1 ·   Orthostatic ? BP  []   1 ·   History of Recent Falls (within 3 mos.)  []   5     Ability to Rise from Chair (choose one) Pts. ·   Ability to rise in a single movement  [x]   0 ·   Pushes up, successful in one attempt  []   1 ·   Multiple attempts, but successful  []   3 ·   Unable to rise without assistance  []   4   Total: (5 or greater = High Risk) 0     Falls Prevention Plan:   []                Physical Limitations to Exercise (specify):   []                Mobility Assistance Device (type):   []                Exercise/Equipment Adaptation (specify):    ©2010 [facility] [place] Patent #[id number].  Federal Law prohibits the replication, distribution or use without written permission from [facility] [employer]

## 2017-12-11 NOTE — THERAPY EVALUATION
Gabriella Stanford  : 1950 [address] [address].  [phone]   [phone]        OUTPATIENT PHYSICAL THERAPY:Initial Assessment 2017        ICD-10: Treatment Diagnosis: Pain in right knee (M25.561); Difficulty in walking, not elsewhere classified (R26.2)  Precautions/Allergies:   Sulfa (sulfonamide antibiotics); Trazodone; and Ultram [tramadol]   Fall Risk Score: 0 (? 5 = High Risk)  MD Orders: evaluate and treat MEDICAL/REFERRING DIAGNOSIS:  Status post total right knee replacement [Z96.651]   DATE OF ONSET: date of surgery: 11/15/17  REFERRING PHYSICIAN: Chrissy Harrington MD  RETURN PHYSICIAN APPOINTMENT: 17     INITIAL ASSESSMENT:  Ms. Juliann Jacobs presents to therapy following right total knee arthroplasty. She has restricted joint mobility into extension and flexion secondary to increased stiffness, swelling and pain along with diminished flexibility of the quadriceps and hamstring musculature. She demonstrates impaired strength throughout the leg, specifically through the quadriceps and lateral hip musculature. This causes difficulty with all weight bearing and mobility tasks. Skilled therapy is required to return to prior level of function. PROBLEM LIST (Impacting functional limitations):  1. Decreased Strength  2. Decreased ADL/Functional Activities  3. Decreased Ambulation Ability/Technique  4. Decreased Balance  5. Increased Pain  6. Decreased Activity Tolerance  7. Decreased Flexibility/Joint Mobility INTERVENTIONS PLANNED:  1. Balance Exercise  2. Cryotherapy  3. Electrical Stimulation  4. Gait Training  5. Home Exercise Program (HEP)  6. Manual Therapy  7. Neuromuscular Re-education/Strengthening  8. Range of Motion (ROM)  9. Therapeutic Activites  10. Therapeutic Exercise/Strengthening  11. modalities   TREATMENT PLAN:  Effective Dates: 17 TO 18.   Frequency/Duration: 3 times a week for 12 weeks  GOALS: (Goals have been discussed and agreed upon with patient.)  Short-Term Functional Goals: Time Frame: 2 weeks  1. Patient will be independent with HEP. 2. Patient will report pain <3/10 with daily activity. Discharge Goals: Time Frame: 12 weeks  1. Patient will report no pain with daily activity. 2. Patient will improve knee flexion to 125 ° to restore mobility required for reciprocal gait descending stairs. 3. Patient will demonstrate 5/5 strength into knee extension to enable walking up to a mile to return to prior level of function. Rehabilitation Potential For Stated Goals: Excellent  Regarding Genora Deter therapy, I certify that the treatment plan above will be carried out by a therapist or under their direction. Thank you for this referral,  Sirisha Mendiola DPT       Referring Physician Signature: Jeremy Patel MD              Date                      HISTORY:   History of Present Injury/Illness (Reason for Referral):  Patient presents to therapy following above surgical repair. She notes a chronic history of knee pain that begun to cause limitations with her walking, kneeling and house keeping duties. She notes no complications with surgery and presents today with primary complaint of consistent aching and soreness. This is continuing to cause difficulty with sleeping, going up and down stairs, and getting in/out of the car. She is continuing to take her prescription pain medication at night to help her sleep, but has begun to taper her medication through the day. She is eager to regain her function, specifically to be able to return to a walking routine (up to 1 mile) for fitness and perform kneeling tasks. Past Medical History/Comorbidities:   Ms. Angelia Miranda  has a past medical history of Arthritis; BMI 38.0-38.9,adult; Chronic pain; GERD (gastroesophageal reflux disease); Insomnia; and Liver disease. She also has no past medical history of Adverse effect of anesthesia;  Aneurysm ([facility] [address] Arrhythmia; Asthma; Autoimmune disease ([facility] [address] CAD (coronary artery disease); Cancer ([facility] [address] Chronic kidney disease; Chronic obstructive pulmonary disease ([facility] [address] Coagulation disorder ([facility] [address] Diabetes ([facility] [address] Difficult intubation; Endocarditis; Heart failure ([facility] [address] Hypertension; Malignant hyperthermia due to anesthesia; Nausea & vomiting; Nicotine vapor product user; Non-nicotine vapor product user; Other unknown and unspecified cause of morbidity or mortality; Pseudocholinesterase deficiency; Psychiatric disorder; PUD (peptic ulcer disease); Rheumatic fever; Seizures ([facility] [address] Stroke [facility]); Thromboembolus ([facility] [address] Thyroid disease; or Unspecified sleep apnea. Ms. Dhiraj Wang  has a past surgical history that includes heent; tonsillectomy (1961); lap cholecystectomy; carpal tunnel release (Left); carpal tunnel release (Right); orthopaedic (Left); orthopaedic (Left); hysterectomy; knee arthroscopy (Right); bunionectomy (Right); and rotator cuff repair (Right). Social History/Living Environment:     Patient lives by herself at home. She has family members that live nearby. Prior Level of Function/Work/Activity:  Patient is retired. Dominant Side:         RIGHT    Current Medications:    Current Outpatient Prescriptions:     acetaminophen (TYLENOL) 500 mg tablet, Take 2 Tabs by mouth every six (6) hours. , Disp: 120 Tab, Rfl: 0    aspirin delayed-release 325 mg tablet, Take 1 Tab by mouth every twelve (12) hours every twelve (12) hours. , Disp: 60 Tab, Rfl: 0    celecoxib (CELEBREX) 200 mg capsule, Take 1 Cap by mouth every twelve (12) hours every twelve (12) hours for 90 days. , Disp: 60 Cap, Rfl: 2    cyclobenzaprine (FLEXERIL) 10 mg tablet, Take 0.5 Tabs by mouth three (3) times daily. , Disp: 60 Tab, Rfl: 0    gabapentin (NEURONTIN) 100 mg capsule, Take 1 Cap by mouth two (2) times a day., Disp: 60 Cap, Rfl: 0    HYDROmorphone (DILAUDID) 2 mg tablet, Take 1 Tab by mouth every four (4) hours as needed.  Max Daily Amount: 12 mg., Disp: 90 Tab, Rfl: 0    ondansetron (ZOFRAN ODT) 8 mg disintegrating tablet, Take 1 Tab by mouth every eight (8) hours as needed for Nausea., Disp: 60 Tab, Rfl: 0    senna-docusate (PERICOLACE) 8.6-50 mg per tablet, Take 2 Tabs by mouth daily. , Disp: 120 Tab, Rfl: 0    zolpidem (AMBIEN) 5 mg tablet, Take 1 Tab by mouth nightly as needed for Sleep. Max Daily Amount: 5 mg., Disp: 60 Tab, Rfl: 0    omeprazole (PRILOSEC) 20 mg capsule, Take 20 mg by mouth Daily (before breakfast). Take / use AM day of surgery  per anesthesia protocols. Indications: GASTROESOPHAGEAL REFLUX, Disp: , Rfl:     multivitamin (ONE A DAY) tablet, Take 1 Tab by mouth daily. , Disp: , Rfl:     VITAMIN E, DL,TOCOPHERYL ACET, (VITAMIN E ACETATE) 400 unit cap capsule, Take 400 Units by mouth every other day., Disp: , Rfl:     magnesium oxide (MAG-OX) 400 mg tablet, Take 400 mg by mouth daily. , Disp: , Rfl:     calcium 500 mg tab, Take 1 Tab by mouth every other day., Disp: , Rfl:     b complex vitamins (B COMPLEX 1) tablet, Take 1 Tab by mouth every other day., Disp: , Rfl:     ascorbic acid (VITAMIN C) 500 mg tablet, Take 500 mg by mouth daily. , Disp: , Rfl:    Date Last Reviewed:  12/11/2017   # of Personal Factors/Comorbidities that affect the Plan of Care: 1-2: MODERATE COMPLEXITY   EXAMINATION:   Observation/Orthostatic Postural Assessment:          Ambulates with delayed gait speed and right lateral trunk tilt. Palpation:          Mild tenderness present throughout the knee and along the distal iliotibial band  ROM:     Right knee: 7 to 90 ° active; 2 to 100 ° with painful, stiff end feel  Left knee: 0 to 130 °   SLR: 90 ° B  Hip extension: 5 ° B      Strength:     Knee extension: 3+/5 R; 5/5 L  Knee flexion: 4-/5 R; 5/5 L  SLR: 4-/5 R; 5/5 L  Hip abduction: 4-/5 R; 5/5 L      Special Tests:          Not applicable  Neurol\ogical Screen:        Sensation is diminished to the distal lateral knee.  Intact otherwise. Functional Mobility:         Ambulates without assistive device  Balance:          Maintains single leg stance for 5 seconds R; 10 seconds L   Body Structures Involved:  1. Nerves  2. Bones  3. Joints  4. Muscles  5. Ligaments Body Functions Affected:  1. Sensory/Pain  2. Neuromusculoskeletal  3. Movement Related Activities and Participation Affected:  1. General Tasks and Demands  2. Mobility  3. Self Care  4. Domestic Life  5. Interpersonal Interactions and Relationships  6. Community, Social and [place]   # of elements that affect the Plan of Care: 3: MODERATE COMPLEXITY   CLINICAL PRESENTATION:   Presentation: Evolving clinical presentation with changing clinical characteristics: MODERATE COMPLEXITY   CLINICAL DECISION MAKING:   Outcome Measure: Tool Used: Lower Extremity Functional Scale (LEFS)  Score:  Initial: 42/80 Most Recent: X/80 (Date: -- )   Interpretation of Score: 20 questions each scored on a 5 point scale with 0 representing \"extreme difficulty or unable to perform\" and 4 representing \"no difficulty\". The lower the score, the greater the functional disability. 80/80 represents no disability. Minimal detectable change is 9 points. Score 80 79-63 62-48 47-32 31-16 15-1 0   Modifier CH CI CJ CK CL CM CN     ? Mobility - Walking and Moving Around:     - CURRENT STATUS: CK - 40%-59% impaired, limited or restricted    - GOAL STATUS: CI - 1%-19% impaired, limited or restricted    - D/C STATUS:  ---------------To be determined---------------      Medical Necessity:   · Patient is expected to demonstrate progress in strength, range of motion, balance and coordination to increase independence with community mobility and return to prior level of function. Reason for Services/Other Comments:  · Patient has demonstrated an improvement in functional level by independent performance of HEP.    Use of outcome tool(s) and clinical judgement create a POC that gives a: Questionable prediction of patient's progress: MODERATE COMPLEXITY   TREATMENT:   (In addition to Assessment/Re-Assessment sessions the following treatments were rendered)  THERAPEUTIC EXERCISE: (see below for minutes):  Exercises per grid below to improve mobility, strength, balance and coordination. Required minimal verbal and manual cues to promote proper body alignment, promote proper body posture and promote proper body mechanics. Progressed resistance, range, repetitions and complexity of movement as indicated. MANUAL THERAPY: (see below for minutes): Joint mobilization and Soft tissue mobilization was utilized and necessary because of the patient's restricted joint motion, painful spasm, loss of articular motion and restricted motion of soft tissue. MODALITIES: (see below for minutes):      to decrease pain     Date: 12/11/17       Modalities: Ice                        Therapeutic Exercise: 15 min       NuStep 5 min        Heel slides [id number]       SLR 3x10       Sit to stand 3x10 from chair       Hip abduction 3x10       Single leg stance [id number]                               Proprioceptive Activities:                                Manual Therapy:        Passive physiologic mobilizations Gr 3 to 4 into flexion and extension performed               Therapeutic Activities:                                        HEP: see 12/11/17 flow sheet for specifics. Treatment/Session Assessment:  Patient is independent with performance of above described home program.    · Pain/ Symptoms: Initial:   5/10 Post Session:  No increase following today's treatment session ·   Compliance with Program/Exercises: Will assess as treatment progresses. · Recommendations/Intent for next treatment session: \"Next visit will focus on advancements to more challenging activities\".   Total Treatment Duration: 1015 to 5420 Marilyn Jacobsen DPT

## 2017-12-12 PROCEDURE — 3331090002 HH PPS REVENUE DEBIT

## 2017-12-12 PROCEDURE — 3331090001 HH PPS REVENUE CREDIT

## 2017-12-13 ENCOUNTER — HOSPITAL ENCOUNTER (OUTPATIENT)
Dept: PHYSICAL THERAPY | Age: 67
Discharge: HOME | End: 2017-12-13
Payer: MEDICARE

## 2017-12-13 PROCEDURE — 97014 ELECTRIC STIMULATION THERAPY: CPT

## 2017-12-13 PROCEDURE — 97140 MANUAL THERAPY 1/> REGIONS: CPT

## 2017-12-13 PROCEDURE — 3331090001 HH PPS REVENUE CREDIT

## 2017-12-13 PROCEDURE — 3331090002 HH PPS REVENUE DEBIT

## 2017-12-13 PROCEDURE — 97110 THERAPEUTIC EXERCISES: CPT

## 2017-12-13 NOTE — PROGRESS NOTES
Allegra Tao  : 1950 [address]akesh [address]  [address] [address].  [phone]   [phone]        OUTPATIENT PHYSICAL THERAPY:Daily Note 2017        ICD-10: Treatment Diagnosis: Pain in right knee (M25.561); Difficulty in walking, not elsewhere classified (R26.2)  Precautions/Allergies:   Sulfa (sulfonamide antibiotics); Trazodone; and Ultram [tramadol]   Fall Risk Score: 0 (? 5 = High Risk)  MD Orders: evaluate and treat MEDICAL/REFERRING DIAGNOSIS:  Status post total right knee replacement [Z96.651]   DATE OF ONSET: date of surgery: 11/15/17  REFERRING PHYSICIAN: Domo Chavez MD  RETURN PHYSICIAN APPOINTMENT: 17     INITIAL ASSESSMENT:  Ms. Maddy Acosta presents to therapy following right total knee arthroplasty. She has restricted joint mobility into extension and flexion secondary to increased stiffness, swelling and pain along with diminished flexibility of the quadriceps and hamstring musculature. She demonstrates impaired strength throughout the leg, specifically through the quadriceps and lateral hip musculature. This causes difficulty with all weight bearing and mobility tasks. Skilled therapy is required to return to prior level of function. PROBLEM LIST (Impacting functional limitations):  1. Decreased Strength  2. Decreased ADL/Functional Activities  3. Decreased Ambulation Ability/Technique  4. Decreased Balance  5. Increased Pain  6. Decreased Activity Tolerance  7. Decreased Flexibility/Joint Mobility INTERVENTIONS PLANNED:  1. Balance Exercise  2. Cryotherapy  3. Electrical Stimulation  4. Gait Training  5. Home Exercise Program (HEP)  6. Manual Therapy  7. Neuromuscular Re-education/Strengthening  8. Range of Motion (ROM)  9. Therapeutic Activites  10. Therapeutic Exercise/Strengthening  11. modalities   TREATMENT PLAN:  Effective Dates: 17 TO 18.   Frequency/Duration: 3 times a week for 12 weeks  GOALS: (Goals have been discussed and agreed upon with patient.)  Short-Term Functional Goals: Time Frame: 2 weeks  1. Patient will be independent with HEP. 2. Patient will report pain <3/10 with daily activity. Discharge Goals: Time Frame: 12 weeks  1. Patient will report no pain with daily activity. 2. Patient will improve knee flexion to 125 ° to restore mobility required for reciprocal gait descending stairs. 3. Patient will demonstrate 5/5 strength into knee extension to enable walking up to a mile to return to prior level of function. Rehabilitation Potential For Stated Goals: Excellent  Regarding Celia Baer therapy, I certify that the treatment plan above will be carried out by a therapist or under their direction. Thank you for this referral,  Lauren George DPT                   HISTORY:   History of Present Injury/Illness (Reason for Referral):  Patient presents to therapy following above surgical repair. She notes a chronic history of knee pain that begun to cause limitations with her walking, kneeling and house keeping duties. She notes no complications with surgery and presents today with primary complaint of consistent aching and soreness. This is continuing to cause difficulty with sleeping, going up and down stairs, and getting in/out of the car. She is continuing to take her prescription pain medication at night to help her sleep, but has begun to taper her medication through the day. She is eager to regain her function, specifically to be able to return to a walking routine (up to 1 mile) for fitness and perform kneeling tasks. Past Medical History/Comorbidities:   Ms. Kevan Kocher  has a past medical history of Arthritis; BMI 38.0-38.9,adult; Chronic pain; GERD (gastroesophageal reflux disease); Insomnia; and Liver disease. She also has no past medical history of Adverse effect of anesthesia; Aneurysm ([address] Arrhythmia; Asthma; Autoimmune disease ([address] CAD (coronary artery disease);  Cancer ([address] Chronic kidney disease; Chronic obstructive pulmonary disease ([facility] [address] Coagulation disorder ([facility] [address] Diabetes ([facility] [address] Difficult intubation; Endocarditis; Heart failure ([facility] [address] Hypertension; Malignant hyperthermia due to anesthesia; Nausea & vomiting; Nicotine vapor product user; Non-nicotine vapor product user; Other unknown and unspecified cause of morbidity or mortality; Pseudocholinesterase deficiency; Psychiatric disorder; PUD (peptic ulcer disease); Rheumatic fever; [facility]ures ([facility] Stroke [facility]); Thromboembolus ([facility] [address] Thyroid disease; or Unspecified sleep apnea. Ms. Jess Kathleen  has a past surgical history that includes heent; tonsillectomy (1961); lap cholecystectomy; carpal tunnel release (Left); carpal tunnel release (Right); orthopaedic (Left); orthopaedic (Left); hysterectomy; knee arthroscopy (Right); bunionectomy (Right); and rotator cuff repair (Right). Social History/Living Environment:     Patient lives by herself at home. She has family members that live nearby. Prior Level of Function/Work/Activity:  Patient is retired. Dominant Side:         RIGHT    Current Medications:    Current Outpatient Prescriptions:     acetaminophen (TYLENOL) 500 mg tablet, Take 2 Tabs by mouth every six (6) hours. , Disp: 120 Tab, Rfl: 0    aspirin delayed-release 325 mg tablet, Take 1 Tab by mouth every twelve (12) hours every twelve (12) hours. , Disp: 60 Tab, Rfl: 0    celecoxib (CELEBREX) 200 mg capsule, Take 1 Cap by mouth every twelve (12) hours every twelve (12) hours for 90 days. , Disp: 60 Cap, Rfl: 2    cyclobenzaprine (FLEXERIL) 10 mg tablet, Take 0.5 Tabs by mouth three (3) times daily. , Disp: 60 Tab, Rfl: 0    gabapentin (NEURONTIN) 100 mg capsule, Take 1 Cap by mouth two (2) times a day., Disp: 60 Cap, Rfl: 0    HYDROmorphone (DILAUDID) 2 mg tablet, Take 1 Tab by mouth every four (4) hours as needed.  Max Daily Amount: 12 mg., Disp: 90 Tab, Rfl: 0    ondansetron (ZOFRAN ODT) 8 mg disintegrating tablet, Take 1 Tab by mouth every eight (8) hours as needed for Nausea., Disp: 60 Tab, Rfl: 0    senna-docusate (PERICOLACE) 8.6-50 mg per tablet, Take 2 Tabs by mouth daily. , Disp: 120 Tab, Rfl: 0    zolpidem (AMBIEN) 5 mg tablet, Take 1 Tab by mouth nightly as needed for Sleep. Max Daily Amount: 5 mg., Disp: 60 Tab, Rfl: 0    omeprazole (PRILOSEC) 20 mg capsule, Take 20 mg by mouth Daily (before breakfast). Take / use AM day of surgery  per anesthesia protocols. Indications: GASTROESOPHAGEAL REFLUX, Disp: , Rfl:     multivitamin (ONE A DAY) tablet, Take 1 Tab by mouth daily. , Disp: , Rfl:     VITAMIN E, DL,TOCOPHERYL ACET, (VITAMIN E ACETATE) 400 unit cap capsule, Take 400 Units by mouth every other day., Disp: , Rfl:     magnesium oxide (MAG-OX) 400 mg tablet, Take 400 mg by mouth daily. , Disp: , Rfl:     calcium 500 mg tab, Take 1 Tab by mouth every other day., Disp: , Rfl:     b complex vitamins (B COMPLEX 1) tablet, Take 1 Tab by mouth every other day., Disp: , Rfl:     ascorbic acid (VITAMIN C) 500 mg tablet, Take 500 mg by mouth daily. , Disp: , Rfl:    Date Last Reviewed:  12/13/2017   EXAMINATION:   Observation/Orthostatic Postural Assessment:          Ambulates with delayed gait speed and right lateral trunk tilt. Palpation:          Mild tenderness present throughout the knee and along the distal iliotibial band  ROM:     Right knee: 7 to 90 ° active; 2 to 100 ° with painful, stiff end feel  Left knee: 0 to 130 °   SLR: 90 ° B  Hip extension: 5 ° B      Strength:     Knee extension: 3+/5 R; 5/5 L  Knee flexion: 4-/5 R; 5/5 L  SLR: 4-/5 R; 5/5 L  Hip abduction: 4-/5 R; 5/5 L      Special Tests:          Not applicable  Neurol\ogical Screen:        Sensation is diminished to the distal lateral knee. Intact otherwise.   Functional Mobility:         Ambulates without assistive device  Balance:          Maintains single leg stance for 5 seconds R; 10 seconds L   Body Structures Involved:  1. Nerves  2. Bones  3. Joints  4. Muscles  5. Ligaments Body Functions Affected:  1. Sensory/Pain  2. Neuromusculoskeletal  3. Movement Related Activities and Participation Affected:  1. General Tasks and Demands  2. Mobility  3. Self Care  4. Domestic Life  5. Interpersonal Interactions and Relationships  6. Community, Social and Civic Life   CLINICAL PRESENTATION:   CLINICAL DECISION MAKING:   Outcome Measure: Tool Used: Lower Extremity Functional Scale (LEFS)  Score:  Initial: 42/80 Most Recent: X/80 (Date: -- )   Interpretation of Score: 20 questions each scored on a 5 point scale with 0 representing \"extreme difficulty or unable to perform\" and 4 representing \"no difficulty\". The lower the score, the greater the functional disability. 80/80 represents no disability. Minimal detectable change is 9 points. Score 80 79-63 62-48 47-32 31-16 15-1 0   Modifier CH CI CJ CK CL CM CN     ? Mobility - Walking and Moving Around:     - CURRENT STATUS: CK - 40%-59% impaired, limited or restricted    - GOAL STATUS: CI - 1%-19% impaired, limited or restricted    - D/C STATUS:  ---------------To be determined---------------      Medical Necessity:   · Patient is expected to demonstrate progress in strength, range of motion, balance and coordination to increase independence with community mobility and return to prior level of function. Reason for Services/Other Comments:  · Patient has demonstrated an improvement in functional level by independent performance of HEP. TREATMENT:   (In addition to Assessment/Re-Assessment sessions the following treatments were rendered)  THERAPEUTIC EXERCISE: (see below for minutes):  Exercises per grid below to improve mobility, strength, balance and coordination. Required minimal verbal and manual cues to promote proper body alignment, promote proper body posture and promote proper body mechanics.   Progressed resistance, range, repetitions and complexity of movement as indicated. MANUAL THERAPY: (see below for minutes): Joint mobilization and Soft tissue mobilization was utilized and necessary because of the patient's restricted joint motion, painful spasm, loss of articular motion and restricted motion of soft tissue. MODALITIES: (see below for minutes):      to decrease pain     Date: 12/11/17 12/13/17 (visit 2)      Modalities:  15 min      Ice  With IFC x 15 min                      Therapeutic Exercise: 15 min 30 min      NuStep 5 min  4 min L4      Heel slides [id number]       SLR 3x10       Sit to stand 3x10 from chair       Hip abduction 3x10 Lateral band walks blue tband 2x8 laps II bars      Single leg stance [id number]       Stretching  [id number] to hamstring, 2x45 sec to calves at incline board      Calf raises  2x15 incline board      Step ups  6\" lateral step up 10x, with single leg stance 2x10      Hamstring curls  4# 4x5                              Proprioceptive Activities:                                Manual Therapy:  15 min      Passive physiologic mobilizations Gr 3 to 4 into flexion and extension performed Repeat, \"stick\" to anterior and lateral thigh, patellar mobilizations              Therapeutic Activities:                                        HEP: see 12/11/17 flow sheet for specifics. Treatment/Session Assessment:  Passive and active flexion continue to progress. · Pain/ Symptoms: Initial:   5/10 \"It's still pretty consistently bothering me. It's just constantly aching. \" Post Session:  No increase following today's treatment session ·   Compliance with Program/Exercises: Will assess as treatment progresses. · Recommendations/Intent for next treatment session: \"Next visit will focus on advancements to more challenging activities\".   Total Treatment Duration:   PT Patient Time In/Time Out  Time In: 1445  Time Out: [id number] [facility]

## 2017-12-14 PROCEDURE — 3331090001 HH PPS REVENUE CREDIT

## 2017-12-14 PROCEDURE — 3331090002 HH PPS REVENUE DEBIT

## 2017-12-15 ENCOUNTER — HOSPITAL ENCOUNTER (OUTPATIENT)
Dept: PHYSICAL THERAPY | Age: 67
Discharge: HOME | End: 2017-12-15
Payer: MEDICARE

## 2017-12-15 PROCEDURE — 3331090001 HH PPS REVENUE CREDIT

## 2017-12-15 PROCEDURE — 97014 ELECTRIC STIMULATION THERAPY: CPT

## 2017-12-15 PROCEDURE — 97140 MANUAL THERAPY 1/> REGIONS: CPT

## 2017-12-15 PROCEDURE — 3331090002 HH PPS REVENUE DEBIT

## 2017-12-15 PROCEDURE — 97110 THERAPEUTIC EXERCISES: CPT

## 2017-12-15 NOTE — PROGRESS NOTES
Shara Joyner  : 1950 [id number] [facility],[address]  [address] [place] [address]  [phone]   [phone]        OUTPATIENT PHYSICAL THERAPY:Daily Note 12/15/2017        ICD-10: Treatment Diagnosis: Pain in right knee (M25.561); Difficulty in walking, not elsewhere classified (R26.2)  Precautions/Allergies:   Sulfa (sulfonamide antibiotics); Trazodone; and Ultram [tramadol]   Fall Risk Score: 0 (? 5 = High Risk)  MD Orders: evaluate and treat MEDICAL/REFERRING DIAGNOSIS:  Status post total right knee replacement [Z96.651]   DATE OF ONSET: date of surgery: 11/15/17  REFERRING PHYSICIAN: Ezequiel Gomes MD  RETURN PHYSICIAN APPOINTMENT: 17     INITIAL ASSESSMENT:  Ms. America Wood presents to therapy following right total knee arthroplasty. She has restricted joint mobility into extension and flexion secondary to increased stiffness, swelling and pain along with diminished flexibility of the quadriceps and hamstring musculature. She demonstrates impaired strength throughout the leg, specifically through the quadriceps and lateral hip musculature. This causes difficulty with all weight bearing and mobility tasks. Skilled therapy is required to return to prior level of function. PROBLEM LIST (Impacting functional limitations):  1. Decreased Strength  2. Decreased ADL/Functional Activities  3. Decreased Ambulation Ability/Technique  4. Decreased Balance  5. Increased Pain  6. Decreased Activity Tolerance  7. Decreased Flexibility/Joint Mobility INTERVENTIONS PLANNED:  1. Balance Exercise  2. Cryotherapy  3. Electrical Stimulation  4. Gait Training  5. Home Exercise Program (HEP)  6. Manual Therapy  7. Neuromuscular Re-education/Strengthening  8. Range of Motion (ROM)  9. Therapeutic Activites  10. Therapeutic Exercise/Strengthening  11. modalities   TREATMENT PLAN:  Effective Dates: 17 TO 18.   Frequency/Duration: 3 times a week for 12 weeks  GOALS: (Goals have been discussed and agreed upon with patient.)  Short-Term Functional Goals: Time Frame: 2 weeks  1. Patient will be independent with HEP. 2. Patient will report pain <3/10 with daily activity. Discharge Goals: Time Frame: 12 weeks  1. Patient will report no pain with daily activity. 2. Patient will improve knee flexion to 125 ° to restore mobility required for reciprocal gait descending stairs. 3. Patient will demonstrate 5/5 strength into knee extension to enable walking up to a mile to return to prior level of function. Rehabilitation Potential For Stated Goals: Excellent  Regarding Genora Deter therapy, I certify that the treatment plan above will be carried out by a therapist or under their direction. Thank you for this referral,  Sirisha Mendiola DPT                   HISTORY:   History of Present Injury/Illness (Reason for Referral):  Patient presents to therapy following above surgical repair. She notes a chronic history of knee pain that begun to cause limitations with her walking, kneeling and house keeping duties. She notes no complications with surgery and presents today with primary complaint of consistent aching and soreness. This is continuing to cause difficulty with sleeping, going up and down stairs, and getting in/out of the car. She is continuing to take her prescription pain medication at night to help her sleep, but has begun to taper her medication through the day. She is eager to regain her function, specifically to be able to return to a walking routine (up to 1 mile) for fitness and perform kneeling tasks. Past Medical History/Comorbidities:   Ms. Angelia Miranda  has a past medical history of Arthritis; BMI 38.0-38.9,adult; Chronic pain; GERD (gastroesophageal reflux disease); Insomnia; and Liver disease. She also has no past medical history of Adverse effect of anesthesia; Aneurysm ([address] Arrhythmia; Asthma; Autoimmune disease ([address] CAD (coronary artery disease);  Cancer ([address] Chronic kidney disease; Chronic obstructive pulmonary disease ([facility] Coagulation disorder ([facility] [facility] Diabetes ([facility] [facility] Difficult intubation; Endocarditis; Heart failure ([facility] [address] [facility] Hypertension; Malignant hyperthermia due to anesthesia; Nausea & vomiting; Nicotine vapor product user; Non-nicotine vapor product user; Other unknown and unspecified cause of morbidity or mortality; Pseudocholinesterase deficiency; Psychiatric disorder; PUD (peptic ulcer disease); Rheumatic fever; Seizures ([facility] Stroke [facility]); Thromboembolus ([facility] Thyroid disease; or Unspecified sleep apnea. Ms. Maddy Acosta  has a past surgical history that includes heent; tonsillectomy (1961); lap cholecystectomy; carpal tunnel release (Left); carpal tunnel release (Right); orthopaedic (Left); orthopaedic (Left); hysterectomy; knee arthroscopy (Right); bunionectomy (Right); and rotator cuff repair (Right). Social History/Living Environment:     Patient lives by herself at home. She has family members that live nearby. Prior Level of Function/Work/Activity:  Patient is retired. Dominant Side:         RIGHT    Current Medications:    Current Outpatient Prescriptions:     acetaminophen (TYLENOL) 500 mg tablet, Take 2 Tabs by mouth every six (6) hours. , Disp: 120 Tab, Rfl: 0    aspirin delayed-release 325 mg tablet, Take 1 Tab by mouth every twelve (12) hours every twelve (12) hours. , Disp: 60 Tab, Rfl: 0    celecoxib (CELEBREX) 200 mg capsule, Take 1 Cap by mouth every twelve (12) hours every twelve (12) hours for 90 days. , Disp: 60 Cap, Rfl: 2    cyclobenzaprine (FLEXERIL) 10 mg tablet, Take 0.5 Tabs by mouth three (3) times daily. , Disp: 60 Tab, Rfl: 0    gabapentin (NEURONTIN) 100 mg capsule, Take 1 Cap by mouth two (2) times a day., Disp: 60 Cap, Rfl: 0    HYDROmorphone (DILAUDID) 2 mg tablet, Take 1 Tab by mouth every four (4) hours as needed.  Max Daily Amount: 12 mg., Disp: 90 Tab, Rfl: 0    ondansetron (ZOFRAN ODT) 8 mg disintegrating tablet, Take 1 Tab by mouth every eight (8) hours as needed for Nausea., Disp: 60 Tab, Rfl: 0    senna-docusate (PERICOLACE) 8.6-50 mg per tablet, Take 2 Tabs by mouth daily. , Disp: 120 Tab, Rfl: 0    zolpidem (AMBIEN) 5 mg tablet, Take 1 Tab by mouth nightly as needed for Sleep. Max Daily Amount: 5 mg., Disp: 60 Tab, Rfl: 0    omeprazole (PRILOSEC) 20 mg capsule, Take 20 mg by mouth Daily (before breakfast). Take / use AM day of surgery  per anesthesia protocols. Indications: GASTROESOPHAGEAL REFLUX, Disp: , Rfl:     multivitamin (ONE A DAY) tablet, Take 1 Tab by mouth daily. , Disp: , Rfl:     VITAMIN E, DL,TOCOPHERYL ACET, (VITAMIN E ACETATE) 400 unit cap capsule, Take 400 Units by mouth every other day., Disp: , Rfl:     magnesium oxide (MAG-OX) 400 mg tablet, Take 400 mg by mouth daily. , Disp: , Rfl:     calcium 500 mg tab, Take 1 Tab by mouth every other day., Disp: , Rfl:     b complex vitamins (B COMPLEX 1) tablet, Take 1 Tab by mouth every other day., Disp: , Rfl:     ascorbic acid (VITAMIN C) 500 mg tablet, Take 500 mg by mouth daily. , Disp: , Rfl:    Date Last Reviewed:  12/15/2017   EXAMINATION:   Observation/Orthostatic Postural Assessment:          Ambulates with delayed gait speed and right lateral trunk tilt. Palpation:          Mild tenderness present throughout the knee and along the distal iliotibial band  ROM:     Right knee: 7 to 90 ° active; 2 to 100 ° with painful, stiff end feel  Left knee: 0 to 130 °   SLR: 90 ° B  Hip extension: 5 ° B      Strength:     Knee extension: 3+/5 R; 5/5 L  Knee flexion: 4-/5 R; 5/5 L  SLR: 4-/5 R; 5/5 L  Hip abduction: 4-/5 R; 5/5 L      Special Tests:          Not applicable  Neurol\ogical Screen:        Sensation is diminished to the distal lateral knee. Intact otherwise.   Functional Mobility:         Ambulates without assistive device  Balance:          Maintains single leg stance for 5 seconds R; 10 seconds L   Body Structures Involved:  1. Nerves  2. Bones  3. Joints  4. Muscles  5. Ligaments Body Functions Affected:  1. Sensory/Pain  2. Neuromusculoskeletal  3. Movement Related Activities and Participation Affected:  1. General Tasks and Demands  2. Mobility  3. Self Care  4. Domestic Life  5. Interpersonal Interactions and Relationships  6. Community, Social and Civic Life   CLINICAL PRESENTATION:   CLINICAL DECISION MAKING:   Outcome Measure: Tool Used: Lower Extremity Functional Scale (LEFS)  Score:  Initial: 42/80 Most Recent: X/80 (Date: -- )   Interpretation of Score: 20 questions each scored on a 5 point scale with 0 representing \"extreme difficulty or unable to perform\" and 4 representing \"no difficulty\". The lower the score, the greater the functional disability. 80/80 represents no disability. Minimal detectable change is 9 points. Score 80 79-63 62-48 47-32 31-16 15-1 0   Modifier CH CI CJ CK CL CM CN     ? Mobility - Walking and Moving Around:     - CURRENT STATUS: CK - 40%-59% impaired, limited or restricted    - GOAL STATUS: CI - 1%-19% impaired, limited or restricted    - D/C STATUS:  ---------------To be determined---------------      Medical Necessity:   · Patient is expected to demonstrate progress in strength, range of motion, balance and coordination to increase independence with community mobility and return to prior level of function. Reason for Services/Other Comments:  · Patient has demonstrated an improvement in functional level by independent performance of HEP. TREATMENT:   (In addition to Assessment/Re-Assessment sessions the following treatments were rendered)  THERAPEUTIC EXERCISE: (see below for minutes):  Exercises per grid below to improve mobility, strength, balance and coordination. Required minimal verbal and manual cues to promote proper body alignment, promote proper body posture and promote proper body mechanics.   Progressed resistance, range, repetitions and complexity of movement as indicated. MANUAL THERAPY: (see below for minutes): Joint mobilization and Soft tissue mobilization was utilized and necessary because of the patient's restricted joint motion, painful spasm, loss of articular motion and restricted motion of soft tissue. MODALITIES: (see below for minutes):      to decrease pain     Date: 12/11/17 12/13/17 (visit 2)      Modalities:  15 min      Ice  With IFC x 15 min                      Therapeutic Exercise: 15 min 30 min 30 min     NuStep 5 min  4 min L4 5 min L4     Heel slides [id number]       SLR 3x10       Sit to stand 3x10 from chair       Hip abduction 3x10 Lateral band walks blue tband 2x8 laps II bars repeat     Single leg stance [id number]       Stretching  [id number] to hamstring, 2x45 sec to calves at incline board repeat     Calf raises  2x15 incline board 2x15 incline board     Step ups  6\" lateral step up 10x, with single leg stance 2x10 6\" lateral step up 10x, with single leg stance 2x10, forward 6\" 3x10     Hamstring curls  4# 4x5 4# 4x5     LAQ   5# 3x15                     Proprioceptive Activities:                                Manual Therapy:  15 min 15 min     Passive physiologic mobilizations Gr 3 to 4 into flexion and extension performed Repeat, \"stick\" to anterior and lateral thigh, patellar mobilizations repeat             Therapeutic Activities:                                        HEP: see 12/11/17 flow sheet for specifics. Treatment/Session Assessment:  Patient performs all exercises with good technique. · Pain/ Symptoms: Initial:   5/10 \"I didn't have any pain after our last session, but it was really sore later that nigh3.\" Post Session:  No increase following today's treatment session ·   Compliance with Program/Exercises: Will assess as treatment progresses. · Recommendations/Intent for next treatment session: \"Next visit will focus on advancements to more challenging activities\".   Total Treatment Duration:   PT Patient Time In/Time Out  Time In: 1530  Time Out: [address] Vandana Marin, DPT

## 2017-12-16 PROCEDURE — 3331090002 HH PPS REVENUE DEBIT

## 2017-12-16 PROCEDURE — 3331090001 HH PPS REVENUE CREDIT

## 2017-12-17 PROCEDURE — 3331090002 HH PPS REVENUE DEBIT

## 2017-12-17 PROCEDURE — 3331090001 HH PPS REVENUE CREDIT

## 2017-12-18 ENCOUNTER — HOSPITAL ENCOUNTER (OUTPATIENT)
Dept: PHYSICAL THERAPY | Age: 67
Discharge: HOME | End: 2017-12-18
Payer: MEDICARE

## 2017-12-18 PROCEDURE — 97140 MANUAL THERAPY 1/> REGIONS: CPT

## 2017-12-18 PROCEDURE — 97110 THERAPEUTIC EXERCISES: CPT

## 2017-12-18 PROCEDURE — 97014 ELECTRIC STIMULATION THERAPY: CPT

## 2017-12-18 PROCEDURE — 3331090002 HH PPS REVENUE DEBIT

## 2017-12-18 PROCEDURE — 3331090001 HH PPS REVENUE CREDIT

## 2017-12-18 NOTE — PROGRESS NOTES
Trisha Guerra  : 1950 [address]  [address] [address].  [phone]   [phone]        OUTPATIENT PHYSICAL THERAPY:Daily Note 2017        ICD-10: Treatment Diagnosis: Pain in right knee (M25.561); Difficulty in walking, not elsewhere classified (R26.2)  Precautions/Allergies:   Sulfa (sulfonamide antibiotics); Trazodone; and Ultram [tramadol]   Fall Risk Score: 0 (? 5 = High Risk)  MD Orders: evaluate and treat MEDICAL/REFERRING DIAGNOSIS:  Status post total right knee replacement [Z96.651]   DATE OF ONSET: date of surgery: 11/15/17  REFERRING PHYSICIAN: Doe Cardona MD  RETURN PHYSICIAN APPOINTMENT: 17     INITIAL ASSESSMENT:  Ms. Nury Bhakta presents to therapy following right total knee arthroplasty. She has restricted joint mobility into extension and flexion secondary to increased stiffness, swelling and pain along with diminished flexibility of the quadriceps and hamstring musculature. She demonstrates impaired strength throughout the leg, specifically through the quadriceps and lateral hip musculature. This causes difficulty with all weight bearing and mobility tasks. Skilled therapy is required to return to prior level of function. PROBLEM LIST (Impacting functional limitations):  1. Decreased Strength  2. Decreased ADL/Functional Activities  3. Decreased Ambulation Ability/Technique  4. Decreased Balance  5. Increased Pain  6. Decreased Activity Tolerance  7. Decreased Flexibility/Joint Mobility INTERVENTIONS PLANNED:  1. Balance Exercise  2. Cryotherapy  3. Electrical Stimulation  4. Gait Training  5. Home Exercise Program (HEP)  6. Manual Therapy  7. Neuromuscular Re-education/Strengthening  8. Range of Motion (ROM)  9. Therapeutic Activites  10. Therapeutic Exercise/Strengthening  11. modalities   TREATMENT PLAN:  Effective Dates: 17 TO 18.   Frequency/Duration: 3 times a week for 12 weeks  GOALS: (Goals have been discussed and agreed upon with patient.)  Short-Term Functional Goals: Time Frame: 2 weeks  1. Patient will be independent with HEP. 2. Patient will report pain <3/10 with daily activity. Discharge Goals: Time Frame: 12 weeks  1. Patient will report no pain with daily activity. 2. Patient will improve knee flexion to 125 ° to restore mobility required for reciprocal gait descending stairs. 3. Patient will demonstrate 5/5 strength into knee extension to enable walking up to a mile to return to prior level of function. Rehabilitation Potential For Stated Goals: Excellent  Regarding Tenna Lucas therapy, I certify that the treatment plan above will be carried out by a therapist or under their direction. Thank you for this referral,  Kimberley Ma DPT                   HISTORY:   History of Present Injury/Illness (Reason for Referral):  Patient presents to therapy following above surgical repair. She notes a chronic history of knee pain that begun to cause limitations with her walking, kneeling and house keeping duties. She notes no complications with surgery and presents today with primary complaint of consistent aching and soreness. This is continuing to cause difficulty with sleeping, going up and down stairs, and getting in/out of the car. She is continuing to take her prescription pain medication at night to help her sleep, but has begun to taper her medication through the day. She is eager to regain her function, specifically to be able to return to a walking routine (up to 1 mile) for fitness and perform kneeling tasks. Past Medical History/Comorbidities:   Ms. America Wood  has a past medical history of Arthritis; BMI 38.0-38.9,adult; Chronic pain; GERD (gastroesophageal reflux disease); Insomnia; and Liver disease. She also has no past medical history of Adverse effect of anesthesia; Aneurysm ([address] Arrhythmia; Asthma; Autoimmune disease ([address] CAD (coronary artery disease);  Cancer ([address] Chronic kidney disease; Chronic obstructive pulmonary disease ([facility] Coagulation disorder ([facility] Diabetes ([facility] Difficult intubation; Endocarditis; Heart failure ([facility] Hypertension; Malignant hyperthermia due to anesthesia; Nausea & vomiting; Nicotine vapor product user; Non-nicotine vapor product user; Other unknown and unspecified cause of morbidity or mortality; Pseudocholinesterase deficiency; Psychiatric disorder; PUD (peptic ulcer disease); Rheumatic fever; Seizures ([facility] Stroke [facility]); Thromboembolus ([facility] Thyroid disease; or Unspecified sleep apnea. Ms. Kevan Kocher  has a past surgical history that includes heent; tonsillectomy (1961); lap cholecystectomy; carpal tunnel release (Left); carpal tunnel release (Right); orthopaedic (Left); orthopaedic (Left); hysterectomy; knee arthroscopy (Right); bunionectomy (Right); and rotator cuff repair (Right). Social History/Living Environment:     Patient lives by herself at home. She has family members that live nearby. Prior Level of Function/Work/Activity:  Patient is retired. Dominant Side:         RIGHT    Current Medications:    Current Outpatient Prescriptions:     acetaminophen (TYLENOL) 500 mg tablet, Take 2 Tabs by mouth every six (6) hours. , Disp: 120 Tab, Rfl: 0    aspirin delayed-release 325 mg tablet, Take 1 Tab by mouth every twelve (12) hours every twelve (12) hours. , Disp: 60 Tab, Rfl: 0    celecoxib (CELEBREX) 200 mg capsule, Take 1 Cap by mouth every twelve (12) hours every twelve (12) hours for 90 days. , Disp: 60 Cap, Rfl: 2    cyclobenzaprine (FLEXERIL) 10 mg tablet, Take 0.5 Tabs by mouth three (3) times daily. , Disp: 60 Tab, Rfl: 0    gabapentin (NEURONTIN) 100 mg capsule, Take 1 Cap by mouth two (2) times a day., Disp: 60 Cap, Rfl: 0    HYDROmorphone (DILAUDID) 2 mg tablet, Take 1 Tab by mouth every four (4) hours as needed.  Max Daily Amount: 12 mg., Disp: 90 Tab, Rfl: 0    ondansetron (ZOFRAN ODT) 8 mg disintegrating tablet, Take 1 Tab by mouth every eight (8) hours as needed for Nausea., Disp: 60 Tab, Rfl: 0    senna-docusate (PERICOLACE) 8.6-50 mg per tablet, Take 2 Tabs by mouth daily. , Disp: 120 Tab, Rfl: 0    zolpidem (AMBIEN) 5 mg tablet, Take 1 Tab by mouth nightly as needed for Sleep. Max Daily Amount: 5 mg., Disp: 60 Tab, Rfl: 0    omeprazole (PRILOSEC) 20 mg capsule, Take 20 mg by mouth Daily (before breakfast). Take / use AM day of surgery  per anesthesia protocols. Indications: GASTROESOPHAGEAL REFLUX, Disp: , Rfl:     multivitamin (ONE A DAY) tablet, Take 1 Tab by mouth daily. , Disp: , Rfl:     VITAMIN E, DL,TOCOPHERYL ACET, (VITAMIN E ACETATE) 400 unit cap capsule, Take 400 Units by mouth every other day., Disp: , Rfl:     magnesium oxide (MAG-OX) 400 mg tablet, Take 400 mg by mouth daily. , Disp: , Rfl:     calcium 500 mg tab, Take 1 Tab by mouth every other day., Disp: , Rfl:     b complex vitamins (B COMPLEX 1) tablet, Take 1 Tab by mouth every other day., Disp: , Rfl:     ascorbic acid (VITAMIN C) 500 mg tablet, Take 500 mg by mouth daily. , Disp: , Rfl:    Date Last Reviewed:  12/18/2017   EXAMINATION:   Observation/Orthostatic Postural Assessment:          Ambulates with delayed gait speed and right lateral trunk tilt. Palpation:          Mild tenderness present throughout the knee and along the distal iliotibial band  ROM:     Right knee: 7 to 90 ° active; 2 to 100 ° with painful, stiff end feel  Left knee: 0 to 130 °   SLR: 90 ° B  Hip extension: 5 ° B      Strength:     Knee extension: 3+/5 R; 5/5 L  Knee flexion: 4-/5 R; 5/5 L  SLR: 4-/5 R; 5/5 L  Hip abduction: 4-/5 R; 5/5 L      Special Tests:          Not applicable  Neurol\ogical Screen:        Sensation is diminished to the distal lateral knee. Intact otherwise.   Functional Mobility:         Ambulates without assistive device  Balance:          Maintains single leg stance for 5 seconds R; 10 seconds L   Body Structures Involved:  1. Nerves  2. Bones  3. Joints  4. Muscles  5. Ligaments Body Functions Affected:  1. Sensory/Pain  2. Neuromusculoskeletal  3. Movement Related Activities and Participation Affected:  1. General Tasks and Demands  2. Mobility  3. Self Care  4. Domestic Life  5. Interpersonal Interactions and Relationships  6. Community, Social and Civic Life   CLINICAL PRESENTATION:   CLINICAL DECISION MAKING:   Outcome Measure: Tool Used: Lower Extremity Functional Scale (LEFS)  Score:  Initial: 42/80 Most Recent: X/80 (Date: -- )   Interpretation of Score: 20 questions each scored on a 5 point scale with 0 representing \"extreme difficulty or unable to perform\" and 4 representing \"no difficulty\". The lower the score, the greater the functional disability. 80/80 represents no disability. Minimal detectable change is 9 points. Score 80 79-63 62-48 47-32 31-16 15-1 0   Modifier CH CI CJ CK CL CM CN     ? Mobility - Walking and Moving Around:     - CURRENT STATUS: CK - 40%-59% impaired, limited or restricted    - GOAL STATUS: CI - 1%-19% impaired, limited or restricted    - D/C STATUS:  ---------------To be determined---------------      Medical Necessity:   · Patient is expected to demonstrate progress in strength, range of motion, balance and coordination to increase independence with community mobility and return to prior level of function. Reason for Services/Other Comments:  · Patient has demonstrated an improvement in functional level by independent performance of HEP. TREATMENT:   (In addition to Assessment/Re-Assessment sessions the following treatments were rendered)  THERAPEUTIC EXERCISE: (see below for minutes):  Exercises per grid below to improve mobility, strength, balance and coordination. Required minimal verbal and manual cues to promote proper body alignment, promote proper body posture and promote proper body mechanics.   Progressed resistance, range, repetitions and complexity of movement as indicated. MANUAL THERAPY: (see below for minutes): Joint mobilization and Soft tissue mobilization was utilized and necessary because of the patient's restricted joint motion, painful spasm, loss of articular motion and restricted motion of soft tissue. MODALITIES: (see below for minutes):      to decrease pain     Date: 12/11/17 12/13/17 (visit 2) 12/15/17 (visit 3) 12/18/17 (visit 4)    Modalities:  15 min 15 min 15 min    Ice  With IFC x 15 min 15 min with IFC Ice with IFC x 15 min                    Therapeutic Exercise: 15 min 30 min 30 min 30 min    NuStep 5 min  4 min L4 5 min L4 6 min L4    Heel slides [id number]       SLR 3x10       Sit to stand 3x10 from chair       Hip abduction 3x10 Lateral band walks blue tband 2x8 laps II bars repeat repeat    Single leg stance [id number]   Forward step to airex [id number]    Stretching  [id number] to hamstring, 2x45 sec to calves at incline board repeat repeat    Calf raises  2x15 incline board 2x15 incline board repeat    Step ups  6\" lateral step up 10x, with single leg stance 2x10 6\" lateral step up 10x, with single leg stance 2x10, forward 6\" 3x10 3x10 lateral, 2x15 forward 6\" each    Hamstring curls  4# 4x5 4# 4x5 4# 2x12    LAQ   5# 3x15 5# 3x15                    Proprioceptive Activities:                                Manual Therapy:  15 min 15 min 15 min    Passive physiologic mobilizations Gr 3 to 4 into flexion and extension performed Repeat, \"stick\" to anterior and lateral thigh, patellar mobilizations repeat repeat            Therapeutic Activities:                                        HEP: see 12/11/17 flow sheet for specifics. Treatment/Session Assessment:  Patient performs all exercises with good technique. · Pain/ Symptoms: Initial:   5/10 \"The leg was just tired after last time. The pain doesn't really bother me during the day, but it will still wake me at night. \" Post Session:  No increase following today's treatment session ·   Compliance with Program/Exercises: Will assess as treatment progresses. · Recommendations/Intent for next treatment session: \"Next visit will focus on advancements to more challenging activities\".   Total Treatment Duration:   PT Patient Time In/Time Out  Time In: 0845  Time Out: 8000 Michael Marin,Nolan 1600, DPT

## 2017-12-19 PROCEDURE — 3331090001 HH PPS REVENUE CREDIT

## 2017-12-19 PROCEDURE — 3331090002 HH PPS REVENUE DEBIT

## 2017-12-20 ENCOUNTER — HOSPITAL ENCOUNTER (OUTPATIENT)
Dept: PHYSICAL THERAPY | Age: 67
Discharge: HOME | End: 2017-12-20
Payer: MEDICARE

## 2017-12-20 PROCEDURE — 97110 THERAPEUTIC EXERCISES: CPT

## 2017-12-20 PROCEDURE — 3331090001 HH PPS REVENUE CREDIT

## 2017-12-20 PROCEDURE — 97014 ELECTRIC STIMULATION THERAPY: CPT

## 2017-12-20 PROCEDURE — 97140 MANUAL THERAPY 1/> REGIONS: CPT

## 2017-12-20 PROCEDURE — 3331090002 HH PPS REVENUE DEBIT

## 2017-12-20 NOTE — PROGRESS NOTES
Shara Joyner  : [id number] [facility],[address]  [address] [place] [address].  [phone]   [phone]        OUTPATIENT PHYSICAL THERAPY:Daily Note 2017        ICD-10: Treatment Diagnosis: Pain in right knee (M25.561); Difficulty in walking, not elsewhere classified (R26.2)  Precautions/Allergies:   Sulfa (sulfonamide antibiotics); Trazodone; and Ultram [tramadol]   Fall Risk Score: 0 (? 5 = High Risk)  MD Orders: evaluate and treat MEDICAL/REFERRING DIAGNOSIS:  Status post total right knee replacement [Z96.651]   DATE OF ONSET: date of surgery: 11/15/17  REFERRING PHYSICIAN: Ezequiel Gomes MD  RETURN PHYSICIAN APPOINTMENT: 17     INITIAL ASSESSMENT:  Ms. America Wood presents to therapy following right total knee arthroplasty. She has restricted joint mobility into extension and flexion secondary to increased stiffness, swelling and pain along with diminished flexibility of the quadriceps and hamstring musculature. She demonstrates impaired strength throughout the leg, specifically through the quadriceps and lateral hip musculature. This causes difficulty with all weight bearing and mobility tasks. Skilled therapy is required to return to prior level of function. PROBLEM LIST (Impacting functional limitations):  1. Decreased Strength  2. Decreased ADL/Functional Activities  3. Decreased Ambulation Ability/Technique  4. Decreased Balance  5. Increased Pain  6. Decreased Activity Tolerance  7. Decreased Flexibility/Joint Mobility INTERVENTIONS PLANNED:  1. Balance Exercise  2. Cryotherapy  3. Electrical Stimulation  4. Gait Training  5. Home Exercise Program (HEP)  6. Manual Therapy  7. Neuromuscular Re-education/Strengthening  8. Range of Motion (ROM)  9. Therapeutic Activites  10. Therapeutic Exercise/Strengthening  11. modalities   TREATMENT PLAN:  Effective Dates: 17 TO 18.   Frequency/Duration: 3 times a week for 12 weeks  GOALS: (Goals have been discussed and agreed upon with patient.)  Short-Term Functional Goals: Time Frame: 2 weeks  1. Patient will be independent with HEP. 2. Patient will report pain <3/10 with daily activity. Discharge Goals: Time Frame: 12 weeks  1. Patient will report no pain with daily activity. 2. Patient will improve knee flexion to 125 ° to restore mobility required for reciprocal gait descending stairs. 3. Patient will demonstrate 5/5 strength into knee extension to enable walking up to a mile to return to prior level of function. Rehabilitation Potential For Stated Goals: Excellent  Regarding Celia Baer therapy, I certify that the treatment plan above will be carried out by a therapist or under their direction. Thank you for this referral,  Lauren George DPT                   HISTORY:   History of Present Injury/Illness (Reason for Referral):  Patient presents to therapy following above surgical repair. She notes a chronic history of knee pain that begun to cause limitations with her walking, kneeling and house keeping duties. She notes no complications with surgery and presents today with primary complaint of consistent aching and soreness. This is continuing to cause difficulty with sleeping, going up and down stairs, and getting in/out of the car. She is continuing to take her prescription pain medication at night to help her sleep, but has begun to taper her medication through the day. She is eager to regain her function, specifically to be able to return to a walking routine (up to 1 mile) for fitness and perform kneeling tasks. Past Medical History/Comorbidities:   Ms. Kevan Kocher  has a past medical history of Arthritis; BMI 38.0-38.9,adult; Chronic pain; GERD (gastroesophageal reflux disease); Insomnia; and Liver disease. She also has no past medical history of Adverse effect of anesthesia; Aneurysm ([address] Arrhythmia; Asthma; Autoimmune disease ([address] CAD (coronary artery disease);  Cancer ([address] Chronic kidney disease; Chronic obstructive pulmonary disease ([facility] Coagulation disorder ([facility] Diabetes ([facility] Difficult intubation; Endocarditis; Heart failure ([facility] Hypertension; Malignant hyperthermia due to anesthesia; Nausea & vomiting; Nicotine vapor product user; Non-nicotine vapor product user; Other unknown and unspecified cause of morbidity or mortality; Pseudocholinesterase deficiency; Psychiatric disorder; PUD (peptic ulcer disease); Rheumatic fever; Seizures ([facility] Stroke [facility]); Thromboembolus ([facility] Thyroid disease; or Unspecified sleep apnea. Ms. Raúl Shipley  has a past surgical history that includes hx heent; hx tonsillectomy (1961); hx lap cholecystectomy; hx carpal tunnel release (Left); hx carpal tunnel release (Right); hx orthopaedic (Left); hx orthopaedic (Left); hx hysterectomy; hx knee arthroscopy (Right); hx bunionectomy (Right); and hx rotator cuff repair (Right). Social History/Living Environment:     Patient lives by herself at home. She has family members that live nearby. Prior Level of Function/Work/Activity:  Patient is retired. Dominant Side:         RIGHT    Current Medications:    Current Outpatient Prescriptions:     acetaminophen (TYLENOL) 500 mg tablet, Take 2 Tabs by mouth every six (6) hours. , Disp: 120 Tab, Rfl: 0    aspirin delayed-release 325 mg tablet, Take 1 Tab by mouth every twelve (12) hours every twelve (12) hours. , Disp: 60 Tab, Rfl: 0    celecoxib (CELEBREX) 200 mg capsule, Take 1 Cap by mouth every twelve (12) hours every twelve (12) hours for 90 days. , Disp: 60 Cap, Rfl: 2    cyclobenzaprine (FLEXERIL) 10 mg tablet, Take 0.5 Tabs by mouth three (3) times daily. , Disp: 60 Tab, Rfl: 0    gabapentin (NEURONTIN) 100 mg capsule, Take 1 Cap by mouth two (2) times a day., Disp: 60 Cap, Rfl: 0    HYDROmorphone (DILAUDID) 2 mg tablet, Take 1 Tab by mouth every four (4) hours as needed.  Max Daily Amount: 12 mg., Disp: 90 Tab, Rfl: 0    ondansetron (ZOFRAN ODT) 8 mg disintegrating tablet, Take 1 Tab by mouth every eight (8) hours as needed for Nausea., Disp: 60 Tab, Rfl: 0    senna-docusate (PERICOLACE) 8.6-50 mg per tablet, Take 2 Tabs by mouth daily. , Disp: 120 Tab, Rfl: 0    zolpidem (AMBIEN) 5 mg tablet, Take 1 Tab by mouth nightly as needed for Sleep. Max Daily Amount: 5 mg., Disp: 60 Tab, Rfl: 0    omeprazole (PRILOSEC) 20 mg capsule, Take 20 mg by mouth Daily (before breakfast). Take / use AM day of surgery  per anesthesia protocols. Indications: GASTROESOPHAGEAL REFLUX, Disp: , Rfl:     multivitamin (ONE A DAY) tablet, Take 1 Tab by mouth daily. , Disp: , Rfl:     VITAMIN E, DL,TOCOPHERYL ACET, (VITAMIN E ACETATE) 400 unit cap capsule, Take 400 Units by mouth every other day., Disp: , Rfl:     magnesium oxide (MAG-OX) 400 mg tablet, Take 400 mg by mouth daily. , Disp: , Rfl:     calcium 500 mg tab, Take 1 Tab by mouth every other day., Disp: , Rfl:     b complex vitamins (B COMPLEX 1) tablet, Take 1 Tab by mouth every other day., Disp: , Rfl:     ascorbic acid (VITAMIN C) 500 mg tablet, Take 500 mg by mouth daily. , Disp: , Rfl:    Date Last Reviewed:  12/20/2017   EXAMINATION:   Observation/Orthostatic Postural Assessment:          Ambulates with delayed gait speed and right lateral trunk tilt. Palpation:          Mild tenderness present throughout the knee and along the distal iliotibial band  ROM:     Right knee: 7 to 90 ° active; 2 to 100 ° with painful, stiff end feel  Left knee: 0 to 130 °   SLR: 90 ° B  Hip extension: 5 ° B      Strength:     Knee extension: 3+/5 R; 5/5 L  Knee flexion: 4-/5 R; 5/5 L  SLR: 4-/5 R; 5/5 L  Hip abduction: 4-/5 R; 5/5 L      Special Tests:          Not applicable  Neurol\ogical Screen:        Sensation is diminished to the distal lateral knee. Intact otherwise.   Functional Mobility:         Ambulates without assistive device  Balance:          Maintains single leg stance for 5 seconds R; 10 seconds L   Body Structures Involved:  1. Nerves  2. Bones  3. Joints  4. Muscles  5. Ligaments Body Functions Affected:  1. Sensory/Pain  2. Neuromusculoskeletal  3. Movement Related Activities and Participation Affected:  1. General Tasks and Demands  2. Mobility  3. Self Care  4. Domestic Life  5. Interpersonal Interactions and Relationships  6. Community, Social and Civic Life   CLINICAL PRESENTATION:   CLINICAL DECISION MAKING:   Outcome Measure: Tool Used: Lower Extremity Functional Scale (LEFS)  Score:  Initial: 42/80 Most Recent: X/80 (Date: -- )   Interpretation of Score: 20 questions each scored on a 5 point scale with 0 representing \"extreme difficulty or unable to perform\" and 4 representing \"no difficulty\". The lower the score, the greater the functional disability. 80/80 represents no disability. Minimal detectable change is 9 points. Score 80 79-63 62-48 47-32 31-16 15-1 0   Modifier CH CI CJ CK CL CM CN     ? Mobility - Walking and Moving Around:     - CURRENT STATUS: CK - 40%-59% impaired, limited or restricted    - GOAL STATUS: CI - 1%-19% impaired, limited or restricted    - D/C STATUS:  ---------------To be determined---------------      Medical Necessity:   · Patient is expected to demonstrate progress in strength, range of motion, balance and coordination to increase independence with community mobility and return to prior level of function. Reason for Services/Other Comments:  · Patient has demonstrated an improvement in functional level by independent performance of HEP. TREATMENT:   (In addition to Assessment/Re-Assessment sessions the following treatments were rendered)  THERAPEUTIC EXERCISE: (see below for minutes):  Exercises per grid below to improve mobility, strength, balance and coordination. Required minimal verbal and manual cues to promote proper body alignment, promote proper body posture and promote proper body mechanics.   Progressed resistance, range, repetitions and complexity of movement as indicated. MANUAL THERAPY: (see below for minutes): Joint mobilization and Soft tissue mobilization was utilized and necessary because of the patient's restricted joint motion, painful spasm, loss of articular motion and restricted motion of soft tissue. MODALITIES: (see below for minutes):      to decrease pain     Date: 12/11/17 12/13/17 (visit 2) 12/15/17 (visit 3) 12/18/17 (visit 4) 12/20/17 (visit 5)   Modalities:  15 min 15 min 15 min 15 min   Ice  With IFC x 15 min 15 min with IFC Ice with IFC x 15 min Ice with IFC x 15 min                   Therapeutic Exercise: 15 min 30 min 30 min 30 min 30 min   NuStep 5 min  4 min L4 5 min L4 6 min L4 5 min L4   Heel slides [id number]       SLR 3x10       Sit to stand 3x10 from chair       Hip abduction 3x10 Lateral band walks blue tband 2x8 laps II bars repeat repeat    Single leg stance [id number]   Forward step to airex [id number] Step on/off airex 3x1 min forward, 3x1 min lateral up and over   Stretching  [id number] to hamstring, 2x45 sec to calves at incline board repeat repeat repeat   Calf raises  2x15 incline board 2x15 incline board repeat    Step ups  6\" lateral step up 10x, with single leg stance 2x10 6\" lateral step up 10x, with single leg stance 2x10, forward 6\" 3x10 3x10 lateral, 2x15 forward 6\" each 3x10 lateral 6\"   Hamstring curls  4# 4x5 4# 4x5 4# 2x12    LAQ   5# 3x15 5# 3x15 5# 3x15   Walking march     x1 lap   Walking butt kick     x1 lap           Proprioceptive Activities:                                Manual Therapy:  15 min 15 min 15 min 15 min   Passive physiologic mobilizations Gr 3 to 4 into flexion and extension performed Repeat, \"stick\" to anterior and lateral thigh, patellar mobilizations repeat repeat \"stick\" to anterior and lateral thigh, PROM into extension and flexion           Therapeutic Activities:                                        HEP: see 12/11/17 flow sheet for specifics.   Treatment/Session Assessment:  Demonstrates difficulty with fatigue and weakness during walking march and butt kick. · Pain/ Symptoms: Initial:   4/10 \"Notes general soreness and fatigue through the leg. Improving ability to go up and down stairs. \" Post Session:  No increase following today's treatment session ·   Compliance with Program/Exercises: Will assess as treatment progresses. · Recommendations/Intent for next treatment session: \"Next visit will focus on advancements to more challenging activities\".   Total Treatment Duration:   PT Patient Time In/Time Out  Time In: 1530  Time Out: [address], [place]

## 2017-12-21 PROCEDURE — 3331090002 HH PPS REVENUE DEBIT

## 2017-12-21 PROCEDURE — 3331090001 HH PPS REVENUE CREDIT

## 2017-12-22 PROCEDURE — 3331090001 HH PPS REVENUE CREDIT

## 2017-12-22 PROCEDURE — 3331090002 HH PPS REVENUE DEBIT

## 2017-12-23 PROCEDURE — 3331090001 HH PPS REVENUE CREDIT

## 2017-12-23 PROCEDURE — 3331090002 HH PPS REVENUE DEBIT

## 2017-12-24 PROCEDURE — 3331090001 HH PPS REVENUE CREDIT

## 2017-12-24 PROCEDURE — 3331090002 HH PPS REVENUE DEBIT

## 2017-12-25 PROCEDURE — 3331090001 HH PPS REVENUE CREDIT

## 2017-12-25 PROCEDURE — 3331090002 HH PPS REVENUE DEBIT

## 2017-12-26 PROCEDURE — 3331090002 HH PPS REVENUE DEBIT

## 2017-12-26 PROCEDURE — 3331090001 HH PPS REVENUE CREDIT

## 2017-12-27 ENCOUNTER — HOSPITAL ENCOUNTER (OUTPATIENT)
Dept: PHYSICAL THERAPY | Age: 67
Discharge: HOME | End: 2017-12-27
Payer: MEDICARE

## 2017-12-27 PROCEDURE — 97140 MANUAL THERAPY 1/> REGIONS: CPT

## 2017-12-27 PROCEDURE — 97014 ELECTRIC STIMULATION THERAPY: CPT

## 2017-12-27 PROCEDURE — 3331090001 HH PPS REVENUE CREDIT

## 2017-12-27 PROCEDURE — 3331090002 HH PPS REVENUE DEBIT

## 2017-12-27 PROCEDURE — 97110 THERAPEUTIC EXERCISES: CPT

## 2017-12-27 NOTE — PROGRESS NOTES
Malcom Brunner  : [id number] [facility],[address]  [address] [place] [address]  [phone]   [phone]        OUTPATIENT PHYSICAL THERAPY:Daily Note 2017        ICD-10: Treatment Diagnosis: Pain in right knee (M25.561); Difficulty in walking, not elsewhere classified (R26.2)  Precautions/Allergies:   Sulfa (sulfonamide antibiotics); Trazodone; and Ultram [tramadol]   Fall Risk Score: 0 (? 5 = High Risk)  MD Orders: evaluate and treat MEDICAL/REFERRING DIAGNOSIS:  Status post total right knee replacement [Z96.651]   DATE OF ONSET: date of surgery: 11/15/17  REFERRING PHYSICIAN: Mekhi Del Cid MD  RETURN PHYSICIAN APPOINTMENT: 17     INITIAL ASSESSMENT:  Ms. Omari Cao presents to therapy following right total knee arthroplasty. She has restricted joint mobility into extension and flexion secondary to increased stiffness, swelling and pain along with diminished flexibility of the quadriceps and hamstring musculature. She demonstrates impaired strength throughout the leg, specifically through the quadriceps and lateral hip musculature. This causes difficulty with all weight bearing and mobility tasks. Skilled therapy is required to return to prior level of function. PROBLEM LIST (Impacting functional limitations):  1. Decreased Strength  2. Decreased ADL/Functional Activities  3. Decreased Ambulation Ability/Technique  4. Decreased Balance  5. Increased Pain  6. Decreased Activity Tolerance  7. Decreased Flexibility/Joint Mobility INTERVENTIONS PLANNED:  1. Balance Exercise  2. Cryotherapy  3. Electrical Stimulation  4. Gait Training  5. Home Exercise Program (HEP)  6. Manual Therapy  7. Neuromuscular Re-education/Strengthening  8. Range of Motion (ROM)  9. Therapeutic Activites  10. Therapeutic Exercise/Strengthening  11. modalities   TREATMENT PLAN:  Effective Dates: 17 TO 18.   Frequency/Duration: 3 times a week for 12 weeks  GOALS: (Goals have been discussed and agreed upon with patient.)  Short-Term Functional Goals: Time Frame: 2 weeks  1. Patient will be independent with HEP. 2. Patient will report pain <3/10 with daily activity. Discharge Goals: Time Frame: 12 weeks  1. Patient will report no pain with daily activity. 2. Patient will improve knee flexion to 125 ° to restore mobility required for reciprocal gait descending stairs. 3. Patient will demonstrate 5/5 strength into knee extension to enable walking up to a mile to return to prior level of function. Rehabilitation Potential For Stated Goals: Excellent  Regarding Anat Deluna therapy, I certify that the treatment plan above will be carried out by a therapist or under their direction. Thank you for this referral,  Vibha Curtis DPT                   HISTORY:   History of Present Injury/Illness (Reason for Referral):  Patient presents to therapy following above surgical repair. She notes a chronic history of knee pain that begun to cause limitations with her walking, kneeling and house keeping duties. She notes no complications with surgery and presents today with primary complaint of consistent aching and soreness. This is continuing to cause difficulty with sleeping, going up and down stairs, and getting in/out of the car. She is continuing to take her prescription pain medication at night to help her sleep, but has begun to taper her medication through the day. She is eager to regain her function, specifically to be able to return to a walking routine (up to 1 mile) for fitness and perform kneeling tasks. Past Medical History/Comorbidities:   Ms. General Henderson  has a past medical history of Arthritis; BMI 38.0-38.9,adult; Chronic pain; GERD (gastroesophageal reflux disease); Insomnia; and Liver disease. She also has no past medical history of Adverse effect of anesthesia; Aneurysm ([address] Arrhythmia; Asthma; Autoimmune disease ([address] CAD (coronary artery disease);  Cancer ([address] Chronic kidney disease; Chronic obstructive pulmonary disease ([facility] Coagulation disorder ([facility] Diabetes ([facility] Difficult intubation; Endocarditis; Heart failure ([facility] Hypertension; Malignant hyperthermia due to anesthesia; Nausea & vomiting; Nicotine vapor product user; Non-nicotine vapor product user; Other unknown and unspecified cause of morbidity or mortality; Pseudocholinesterase deficiency; Psychiatric disorder; PUD (peptic ulcer disease); Rheumatic fever; Seizures ([facility] Stroke [facility]); Thromboembolus ([facility] Thyroid disease; or Unspecified sleep apnea. Ms. Divina Spann  has a past surgical history that includes hx heent; hx tonsillectomy (1961); hx lap cholecystectomy; hx carpal tunnel release (Left); hx carpal tunnel release (Right); hx orthopaedic (Left); hx orthopaedic (Left); hx hysterectomy; hx knee arthroscopy (Right); hx bunionectomy (Right); and hx rotator cuff repair (Right). Social History/Living Environment:     Patient lives by herself at home. She has family members that live nearby. Prior Level of Function/Work/Activity:  Patient is retired. Dominant Side:         RIGHT    Current Medications:    Current Outpatient Prescriptions:     acetaminophen (TYLENOL) 500 mg tablet, Take 2 Tabs by mouth every six (6) hours. , Disp: 120 Tab, Rfl: 0    aspirin delayed-release 325 mg tablet, Take 1 Tab by mouth every twelve (12) hours every twelve (12) hours. , Disp: 60 Tab, Rfl: 0    celecoxib (CELEBREX) 200 mg capsule, Take 1 Cap by mouth every twelve (12) hours every twelve (12) hours for 90 days. , Disp: 60 Cap, Rfl: 2    cyclobenzaprine (FLEXERIL) 10 mg tablet, Take 0.5 Tabs by mouth three (3) times daily. , Disp: 60 Tab, Rfl: 0    gabapentin (NEURONTIN) 100 mg capsule, Take 1 Cap by mouth two (2) times a day., Disp: 60 Cap, Rfl: 0    HYDROmorphone (DILAUDID) 2 mg tablet, Take 1 Tab by mouth every four (4) hours as needed.  Max Daily Amount: 12 mg., Disp: 90 Tab, Rfl: 0    ondansetron (ZOFRAN ODT) 8 mg disintegrating tablet, Take 1 Tab by mouth every eight (8) hours as needed for Nausea., Disp: 60 Tab, Rfl: 0    senna-docusate (PERICOLACE) 8.6-50 mg per tablet, Take 2 Tabs by mouth daily. , Disp: 120 Tab, Rfl: 0    zolpidem (AMBIEN) 5 mg tablet, Take 1 Tab by mouth nightly as needed for Sleep. Max Daily Amount: 5 mg., Disp: 60 Tab, Rfl: 0    omeprazole (PRILOSEC) 20 mg capsule, Take 20 mg by mouth Daily (before breakfast). Take / use AM day of surgery  per anesthesia protocols. Indications: GASTROESOPHAGEAL REFLUX, Disp: , Rfl:     multivitamin (ONE A DAY) tablet, Take 1 Tab by mouth daily. , Disp: , Rfl:     VITAMIN E, DL,TOCOPHERYL ACET, (VITAMIN E ACETATE) 400 unit cap capsule, Take 400 Units by mouth every other day., Disp: , Rfl:     magnesium oxide (MAG-OX) 400 mg tablet, Take 400 mg by mouth daily. , Disp: , Rfl:     calcium 500 mg tab, Take 1 Tab by mouth every other day., Disp: , Rfl:     b complex vitamins (B COMPLEX 1) tablet, Take 1 Tab by mouth every other day., Disp: , Rfl:     ascorbic acid (VITAMIN C) 500 mg tablet, Take 500 mg by mouth daily. , Disp: , Rfl:    Date Last Reviewed:  12/27/2017   EXAMINATION:   Observation/Orthostatic Postural Assessment:          Ambulates with delayed gait speed and right lateral trunk tilt. Palpation:          Mild tenderness present throughout the knee and along the distal iliotibial band  ROM:     Right knee: 7 to 90 ° active; 2 to 100 ° with painful, stiff end feel  Left knee: 0 to 130 °   SLR: 90 ° B  Hip extension: 5 ° B      Strength:     Knee extension: 3+/5 R; 5/5 L  Knee flexion: 4-/5 R; 5/5 L  SLR: 4-/5 R; 5/5 L  Hip abduction: 4-/5 R; 5/5 L      Special Tests:          Not applicable  Neurol\ogical Screen:        Sensation is diminished to the distal lateral knee. Intact otherwise.   Functional Mobility:         Ambulates without assistive device  Balance:          Maintains single leg stance for 5 seconds R; 10 seconds L   Body Structures Involved:  1. Nerves  2. Bones  3. Joints  4. Muscles  5. Ligaments Body Functions Affected:  1. Sensory/Pain  2. Neuromusculoskeletal  3. Movement Related Activities and Participation Affected:  1. General Tasks and Demands  2. Mobility  3. Self Care  4. Domestic Life  5. Interpersonal Interactions and Relationships  6. Community, Social and Civic Life   CLINICAL PRESENTATION:   CLINICAL DECISION MAKING:   Outcome Measure: Tool Used: Lower Extremity Functional Scale (LEFS)  Score:  Initial: 42/80 Most Recent: X/80 (Date: -- )   Interpretation of Score: 20 questions each scored on a 5 point scale with 0 representing \"extreme difficulty or unable to perform\" and 4 representing \"no difficulty\". The lower the score, the greater the functional disability. 80/80 represents no disability. Minimal detectable change is 9 points. Score 80 79-63 62-48 47-32 31-16 15-1 0   Modifier CH CI CJ CK CL CM CN     ? Mobility - Walking and Moving Around:     - CURRENT STATUS: CK - 40%-59% impaired, limited or restricted    - GOAL STATUS: CI - 1%-19% impaired, limited or restricted    - D/C STATUS:  ---------------To be determined---------------      Medical Necessity:   · Patient is expected to demonstrate progress in strength, range of motion, balance and coordination to increase independence with community mobility and return to prior level of function. Reason for Services/Other Comments:  · Patient has demonstrated an improvement in functional level by independent performance of HEP. TREATMENT:   (In addition to Assessment/Re-Assessment sessions the following treatments were rendered)  THERAPEUTIC EXERCISE: (see below for minutes):  Exercises per grid below to improve mobility, strength, balance and coordination. Required minimal verbal and manual cues to promote proper body alignment, promote proper body posture and promote proper body mechanics.   Progressed resistance, range, repetitions and complexity of movement as indicated. MANUAL THERAPY: (see below for minutes): Joint mobilization and Soft tissue mobilization was utilized and necessary because of the patient's restricted joint motion, painful spasm, loss of articular motion and restricted motion of soft tissue.    MODALITIES: (see below for minutes):      to decrease pain     Date: 12/11/17 12/13/17 (visit 2) 12/15/17 (visit 3) 12/18/17 (visit 4) 12/20/17 (visit 5) 12/27/17 (visit 6)    Modalities:  15 min 15 min 15 min 15 min 15 min    Ice  With IFC x 15 min 15 min with IFC Ice with IFC x 15 min Ice with IFC x 15 min Ice with IFC x 15 min                        Therapeutic Exercise: 15 min 30 min 30 min 30 min 30 min 30 min    NuStep 5 min  4 min L4 5 min L4 6 min L4 5 min L4 5 min L4    Heel slides [id number]         SLR 3x10         Sit to stand 3x10 from chair         Hip abduction 3x10 Lateral band walks blue tband 2x8 laps II bars repeat repeat      Single leg stance [id number]   Forward step to airex [id number] Step on/off airex 3x1 min forward, 3x1 min lateral up and over Forward step up 6\" 2x10 with 5sec hold, lateral step up and over airex 3x1 min    Stretching  [id number] to hamstring, 2x45 sec to calves at incline board repeat repeat repeat repeat    Calf raises  2x15 incline board 2x15 incline board repeat  2x15 incline board    Step ups  6\" lateral step up 10x, with single leg stance 2x10 6\" lateral step up 10x, with single leg stance 2x10, forward 6\" 3x10 3x10 lateral, 2x15 forward 6\" each 3x10 lateral 6\" 3x10 lateral 6\"     Hamstring curls  4# 4x5 4# 4x5 4# 2x12  4# 3x15    LAQ   5# 3x15 5# 3x15 5# 3x15 5# 3x15    Walking march     x1 lap     Walking butt kick     x1 lap     Bridge       2x10    Lower trunk rotation      20x    Hip extension      Standing blue tband 2x10              Proprioceptive Activities:                                        Manual Therapy:  15 min 15 min 15 min 15 min 15 min    Passive physiologic mobilizations Gr 3 to 4 into flexion and extension performed Repeat, \"stick\" to anterior and lateral thigh, patellar mobilizations repeat repeat \"stick\" to anterior and lateral thigh, PROM into extension and flexion repeat              Therapeutic Activities:                                                  HEP: see 12/11/17 flow sheet for specifics. Treatment/Session Assessment:  Added bridging and lower trunk rotation to decrease LBP. Demonstrates improved stability during step up to single leg stance. · Pain/ Symptoms: Initial:   3/10 \"Continues to have difficulty with sleeping. Fatigues quickly with prolonged standing activities. Notes continued discomfort through R PSIS and posterior hip that has not resolved. Post Session:  No increase following today's treatment session ·   Compliance with Program/Exercises: Will assess as treatment progresses. · Recommendations/Intent for next treatment session: \"Next visit will focus on advancements to more challenging activities\".   Total Treatment Duration:   PT Patient Time In/Time Out  Time In: 1015  Time Out: [address], DPT

## 2017-12-28 PROCEDURE — 3331090001 HH PPS REVENUE CREDIT

## 2017-12-28 PROCEDURE — 3331090002 HH PPS REVENUE DEBIT

## 2017-12-29 ENCOUNTER — HOSPITAL ENCOUNTER (OUTPATIENT)
Dept: PHYSICAL THERAPY | Age: 67
Discharge: HOME | End: 2017-12-29
Payer: MEDICARE

## 2017-12-29 PROCEDURE — 3331090002 HH PPS REVENUE DEBIT

## 2017-12-29 PROCEDURE — 3331090001 HH PPS REVENUE CREDIT

## 2017-12-29 PROCEDURE — 97014 ELECTRIC STIMULATION THERAPY: CPT

## 2017-12-29 PROCEDURE — 97140 MANUAL THERAPY 1/> REGIONS: CPT

## 2017-12-29 PROCEDURE — 97110 THERAPEUTIC EXERCISES: CPT

## 2017-12-29 NOTE — PROGRESS NOTES
Renato Nehemias  : 1950 [id number] [address].  [phone]   [phone]        OUTPATIENT PHYSICAL THERAPY:Daily Note 2017        ICD-10: Treatment Diagnosis: Pain in right knee (M25.561); Difficulty in walking, not elsewhere classified (R26.2)  Precautions/Allergies:   Sulfa (sulfonamide antibiotics); Trazodone; and Ultram [tramadol]   Fall Risk Score: 0 (? 5 = High Risk)  MD Orders: evaluate and treat MEDICAL/REFERRING DIAGNOSIS:  Status post total right knee replacement [Z96.651]   DATE OF ONSET: date of surgery: 11/15/17  REFERRING PHYSICIAN: Chance Parker MD  RETURN PHYSICIAN APPOINTMENT: 17     INITIAL ASSESSMENT:  Ms. Georgianna Cheadle presents to therapy following right total knee arthroplasty. She has restricted joint mobility into extension and flexion secondary to increased stiffness, swelling and pain along with diminished flexibility of the quadriceps and hamstring musculature. She demonstrates impaired strength throughout the leg, specifically through the quadriceps and lateral hip musculature. This causes difficulty with all weight bearing and mobility tasks. Skilled therapy is required to return to prior level of function. PROBLEM LIST (Impacting functional limitations):  1. Decreased Strength  2. Decreased ADL/Functional Activities  3. Decreased Ambulation Ability/Technique  4. Decreased Balance  5. Increased Pain  6. Decreased Activity Tolerance  7. Decreased Flexibility/Joint Mobility INTERVENTIONS PLANNED:  1. Balance Exercise  2. Cryotherapy  3. Electrical Stimulation  4. Gait Training  5. Home Exercise Program (HEP)  6. Manual Therapy  7. Neuromuscular Re-education/Strengthening  8. Range of Motion (ROM)  9. Therapeutic Activites  10. Therapeutic Exercise/Strengthening  11. modalities   TREATMENT PLAN:  Effective Dates: 17 TO 18.   Frequency/Duration: 3 times a week for 12 weeks  GOALS: (Goals have been discussed and agreed upon with patient.)  Short-Term Functional Goals: Time Frame: 2 weeks  1. Patient will be independent with HEP. 2. Patient will report pain <3/10 with daily activity. Discharge Goals: Time Frame: 12 weeks  1. Patient will report no pain with daily activity. 2. Patient will improve knee flexion to 125 ° to restore mobility required for reciprocal gait descending stairs. 3. Patient will demonstrate 5/5 strength into knee extension to enable walking up to a mile to return to prior level of function. Rehabilitation Potential For Stated Goals: Excellent  Regarding Jarred Yeboah therapy, I certify that the treatment plan above will be carried out by a therapist or under their direction. Thank you for this referral,  Cole Malin DPT                   HISTORY:   History of Present Injury/Illness (Reason for Referral):  Patient presents to therapy following above surgical repair. She notes a chronic history of knee pain that begun to cause limitations with her walking, kneeling and house keeping duties. She notes no complications with surgery and presents today with primary complaint of consistent aching and soreness. This is continuing to cause difficulty with sleeping, going up and down stairs, and getting in/out of the car. She is continuing to take her prescription pain medication at night to help her sleep, but has begun to taper her medication through the day. She is eager to regain her function, specifically to be able to return to a walking routine (up to 1 mile) for fitness and perform kneeling tasks. Past Medical History/Comorbidities:   Ms. Nia Andre  has a past medical history of Arthritis; BMI 38.0-38.9,adult; Chronic pain; GERD (gastroesophageal reflux disease); Insomnia; and Liver disease. She also has no past medical history of Adverse effect of anesthesia; Aneurysm ([address] Arrhythmia; Asthma; Autoimmune disease ([address] CAD (coronary artery disease);  Cancer ([address] Chronic kidney disease; Chronic obstructive pulmonary disease ([facility] Coagulation disorder ([facility] Diabetes ([facility] Difficult intubation; Endocarditis; Heart failure ([facility] Hypertension; Malignant hyperthermia due to anesthesia; Nausea & vomiting; Nicotine vapor product user; Non-nicotine vapor product user; Other unknown and unspecified cause of morbidity or mortality; Pseudocholinesterase deficiency; Psychiatric disorder; PUD (peptic ulcer disease); Rheumatic fever; Seizures ([facility] Stroke [facility]); Thromboembolus ([facility] Thyroid disease; or Unspecified sleep apnea. Ms. Bhavna Marks  has a past surgical history that includes hx heent; hx tonsillectomy (1961); hx lap cholecystectomy; hx carpal tunnel release (Left); hx carpal tunnel release (Right); hx orthopaedic (Left); hx orthopaedic (Left); hx hysterectomy; hx knee arthroscopy (Right); hx bunionectomy (Right); and hx rotator cuff repair (Right). Social History/Living Environment:     Patient lives by herself at home. She has family members that live nearby. Prior Level of Function/Work/Activity:  Patient is retired. Dominant Side:         RIGHT    Current Medications:    Current Outpatient Prescriptions:     acetaminophen (TYLENOL) 500 mg tablet, Take 2 Tabs by mouth every six (6) hours. , Disp: 120 Tab, Rfl: 0    aspirin delayed-release 325 mg tablet, Take 1 Tab by mouth every twelve (12) hours every twelve (12) hours. , Disp: 60 Tab, Rfl: 0    celecoxib (CELEBREX) 200 mg capsule, Take 1 Cap by mouth every twelve (12) hours every twelve (12) hours for 90 days. , Disp: 60 Cap, Rfl: 2    cyclobenzaprine (FLEXERIL) 10 mg tablet, Take 0.5 Tabs by mouth three (3) times daily. , Disp: 60 Tab, Rfl: 0    gabapentin (NEURONTIN) 100 mg capsule, Take 1 Cap by mouth two (2) times a day., Disp: 60 Cap, Rfl: 0    HYDROmorphone (DILAUDID) 2 mg tablet, Take 1 Tab by mouth every four (4) hours as needed.  Max Daily Amount: 12 mg., Disp: 90 Tab, Rfl: 0    ondansetron (ZOFRAN ODT) 8 mg disintegrating tablet, Take 1 Tab by mouth every eight (8) hours as needed for Nausea., Disp: 60 Tab, Rfl: 0    senna-docusate (PERICOLACE) 8.6-50 mg per tablet, Take 2 Tabs by mouth daily. , Disp: 120 Tab, Rfl: 0    zolpidem (AMBIEN) 5 mg tablet, Take 1 Tab by mouth nightly as needed for Sleep. Max Daily Amount: 5 mg., Disp: 60 Tab, Rfl: 0    omeprazole (PRILOSEC) 20 mg capsule, Take 20 mg by mouth Daily (before breakfast). Take / use AM day of surgery  per anesthesia protocols. Indications: GASTROESOPHAGEAL REFLUX, Disp: , Rfl:     multivitamin (ONE A DAY) tablet, Take 1 Tab by mouth daily. , Disp: , Rfl:     VITAMIN E, DL,TOCOPHERYL ACET, (VITAMIN E ACETATE) 400 unit cap capsule, Take 400 Units by mouth every other day., Disp: , Rfl:     magnesium oxide (MAG-OX) 400 mg tablet, Take 400 mg by mouth daily. , Disp: , Rfl:     calcium 500 mg tab, Take 1 Tab by mouth every other day., Disp: , Rfl:     b complex vitamins (B COMPLEX 1) tablet, Take 1 Tab by mouth every other day., Disp: , Rfl:     ascorbic acid (VITAMIN C) 500 mg tablet, Take 500 mg by mouth daily. , Disp: , Rfl:    Date Last Reviewed:  12/29/2017   EXAMINATION:   Observation/Orthostatic Postural Assessment:          Ambulates with delayed gait speed and right lateral trunk tilt. Palpation:          Mild tenderness present throughout the knee and along the distal iliotibial band  ROM:     Right knee: 7 to 90 ° active; 2 to 100 ° with painful, stiff end feel  Left knee: 0 to 130 °   SLR: 90 ° B  Hip extension: 5 ° B      Strength:     Knee extension: 3+/5 R; 5/5 L  Knee flexion: 4-/5 R; 5/5 L  SLR: 4-/5 R; 5/5 L  Hip abduction: 4-/5 R; 5/5 L      Special Tests:          Not applicable  Neurol\ogical Screen:        Sensation is diminished to the distal lateral knee. Intact otherwise.   Functional Mobility:         Ambulates without assistive device  Balance:          Maintains single leg stance for 5 seconds R; 10 seconds L   Body Structures Involved:  1. Nerves  2. Bones  3. Joints  4. Muscles  5. Ligaments Body Functions Affected:  1. Sensory/Pain  2. Neuromusculoskeletal  3. Movement Related Activities and Participation Affected:  1. General Tasks and Demands  2. Mobility  3. Self Care  4. Domestic Life  5. Interpersonal Interactions and Relationships  6. Community, Social and Civic Life   CLINICAL PRESENTATION:   CLINICAL DECISION MAKING:   Outcome Measure: Tool Used: Lower Extremity Functional Scale (LEFS)  Score:  Initial: 42/80 Most Recent: X/80 (Date: -- )   Interpretation of Score: 20 questions each scored on a 5 point scale with 0 representing \"extreme difficulty or unable to perform\" and 4 representing \"no difficulty\". The lower the score, the greater the functional disability. 80/80 represents no disability. Minimal detectable change is 9 points. Score 80 79-63 62-48 47-32 31-16 15-1 0   Modifier CH CI CJ CK CL CM CN     ? Mobility - Walking and Moving Around:     - CURRENT STATUS: CK - 40%-59% impaired, limited or restricted    - GOAL STATUS: CI - 1%-19% impaired, limited or restricted    - D/C STATUS:  ---------------To be determined---------------      Medical Necessity:   · Patient is expected to demonstrate progress in strength, range of motion, balance and coordination to increase independence with community mobility and return to prior level of function. Reason for Services/Other Comments:  · Patient has demonstrated an improvement in functional level by independent performance of HEP. TREATMENT:   (In addition to Assessment/Re-Assessment sessions the following treatments were rendered)  THERAPEUTIC EXERCISE: (see below for minutes):  Exercises per grid below to improve mobility, strength, balance and coordination. Required minimal verbal and manual cues to promote proper body alignment, promote proper body posture and promote proper body mechanics.   Progressed resistance, range, repetitions and complexity of movement as indicated. MANUAL THERAPY: (see below for minutes): Joint mobilization and Soft tissue mobilization was utilized and necessary because of the patient's restricted joint motion, painful spasm, loss of articular motion and restricted motion of soft tissue.    MODALITIES: (see below for minutes):      to decrease pain     Date: 12/11/17 12/13/17 (visit 2) 12/15/17 (visit 3) 12/18/17 (visit 4) 12/20/17 (visit 5) 12/27/17 (visit 6) 12/29/17 (visit 7)   Modalities:  15 min 15 min 15 min 15 min 15 min 15 min   Ice  With IFC x 15 min 15 min with IFC Ice with IFC x 15 min Ice with IFC x 15 min Ice with IFC x 15 min Ice with IFC x 15 min                       Therapeutic Exercise: 15 min 30 min 30 min 30 min 30 min 30 min 30 min   NuStep 5 min  4 min L4 5 min L4 6 min L4 5 min L4 5 min L4 5 min L4   Heel slides [id number]         SLR 3x10         Sit to stand 3x10 from chair         Hip abduction 3x10 Lateral band walks blue tband 2x8 laps II bars repeat repeat      Single leg stance [id number]   Forward step to airex [id number] Step on/off airex 3x1 min forward, 3x1 min lateral up and over Forward step up 6\" 2x10 with 5sec hold, lateral step up and over airex 3x1 min Step to airex [id number] forward, [id number] lateral   Stretching  [id number] to hamstring, 2x45 sec to calves at incline board repeat repeat repeat repeat [id number] to hamstring   Calf raises  2x15 incline board 2x15 incline board repeat  2x15 incline board 2x10 incline board   Step ups  6\" lateral step up 10x, with single leg stance 2x10 6\" lateral step up 10x, with single leg stance 2x10, forward 6\" 3x10 3x10 lateral, 2x15 forward 6\" each 3x10 lateral 6\" 3x10 lateral 6\"     Hamstring curls  4# 4x5 4# 4x5 4# 2x12  4# 3x15 repeat   LAQ   5# 3x15 5# 3x15 5# 3x15 5# 3x15 repeat   Walking march     x1 lap     Walking butt kick     x1 lap     Bridge       2x10 2x10   Lower trunk rotation      20x    Hip extension      Standing blue tband 2x10    Split squat       To 2 airex 3x10   Proprioceptive Activities:                                        Manual Therapy:  15 min 15 min 15 min 15 min 15 min 15 min   Passive physiologic mobilizations Gr 3 to 4 into flexion and extension performed Repeat, \"stick\" to anterior and lateral thigh, patellar mobilizations repeat repeat \"stick\" to anterior and lateral thigh, PROM into extension and flexion repeat repeat             Therapeutic Activities:                                                  HEP: see 12/11/17 flow sheet for specifics. Treatment/Session Assessment:  Progressed to split squat to improve capacity for getting up from the floor. · Pain/ Symptoms: Initial:   3/10 \"I'm sleeping a bit better. My back is feeling some better. \"  Post Session:  No increase following today's treatment session ·   Compliance with Program/Exercises: Will assess as treatment progresses. · Recommendations/Intent for next treatment session: \"Next visit will focus on advancements to more challenging activities\".   Total Treatment Duration:   PT Patient Time In/Time Out  Time In: 0845  Time Out: [address] [facility],[address], DPT

## 2017-12-30 PROCEDURE — 3331090001 HH PPS REVENUE CREDIT

## 2017-12-30 PROCEDURE — 3331090002 HH PPS REVENUE DEBIT

## 2017-12-31 PROCEDURE — 3331090002 HH PPS REVENUE DEBIT

## 2017-12-31 PROCEDURE — 3331090001 HH PPS REVENUE CREDIT

## 2018-01-01 PROCEDURE — 3331090002 HH PPS REVENUE DEBIT

## 2018-01-01 PROCEDURE — 3331090001 HH PPS REVENUE CREDIT

## 2018-01-02 ENCOUNTER — HOSPITAL ENCOUNTER (OUTPATIENT)
Dept: PHYSICAL THERAPY | Age: 68
Discharge: HOME | End: 2018-01-02
Payer: MEDICARE

## 2018-01-02 PROCEDURE — 97014 ELECTRIC STIMULATION THERAPY: CPT

## 2018-01-02 PROCEDURE — 3331090001 HH PPS REVENUE CREDIT

## 2018-01-02 PROCEDURE — 97140 MANUAL THERAPY 1/> REGIONS: CPT

## 2018-01-02 PROCEDURE — 3331090002 HH PPS REVENUE DEBIT

## 2018-01-02 PROCEDURE — 97110 THERAPEUTIC EXERCISES: CPT

## 2018-01-02 NOTE — PROGRESS NOTES
April Duke  : 1950 [address], [address] [place] [address]  [phone]   [phone]        OUTPATIENT PHYSICAL THERAPY:Daily Note 2018        ICD-10: Treatment Diagnosis: Pain in right knee (M25.561); Difficulty in walking, not elsewhere classified (R26.2)  Precautions/Allergies:   Sulfa (sulfonamide antibiotics); Trazodone; and Ultram [tramadol]   Fall Risk Score: 0 (? 5 = High Risk)  MD Orders: evaluate and treat MEDICAL/REFERRING DIAGNOSIS:  Status post total right knee replacement [Z96.651]   DATE OF ONSET: date of surgery: 11/15/17  REFERRING PHYSICIAN: Karolina Cortez MD  RETURN PHYSICIAN APPOINTMENT: 17     INITIAL ASSESSMENT:  Ms. Yrn Miranda presents to therapy following right total knee arthroplasty. She has restricted joint mobility into extension and flexion secondary to increased stiffness, swelling and pain along with diminished flexibility of the quadriceps and hamstring musculature. She demonstrates impaired strength throughout the leg, specifically through the quadriceps and lateral hip musculature. This causes difficulty with all weight bearing and mobility tasks. Skilled therapy is required to return to prior level of function. PROBLEM LIST (Impacting functional limitations):  1. Decreased Strength  2. Decreased ADL/Functional Activities  3. Decreased Ambulation Ability/Technique  4. Decreased Balance  5. Increased Pain  6. Decreased Activity Tolerance  7. Decreased Flexibility/Joint Mobility INTERVENTIONS PLANNED:  1. Balance Exercise  2. Cryotherapy  3. Electrical Stimulation  4. Gait Training  5. Home Exercise Program (HEP)  6. Manual Therapy  7. Neuromuscular Re-education/Strengthening  8. Range of Motion (ROM)  9. Therapeutic Activites  10. Therapeutic Exercise/Strengthening  11. modalities   TREATMENT PLAN:  Effective Dates: 17 TO 18.   Frequency/Duration: 3 times a week for 12 weeks  GOALS: (Goals have been discussed and agreed upon with patient.)  Short-Term Functional Goals: Time Frame: 2 weeks  1. Patient will be independent with HEP. 2. Patient will report pain <3/10 with daily activity. Discharge Goals: Time Frame: 12 weeks  1. Patient will report no pain with daily activity. 2. Patient will improve knee flexion to 125 ° to restore mobility required for reciprocal gait descending stairs. 3. Patient will demonstrate 5/5 strength into knee extension to enable walking up to a mile to return to prior level of function. Rehabilitation Potential For Stated Goals: Excellent  Regarding Evelin Hernandez therapy, I certify that the treatment plan above will be carried out by a therapist or under their direction. Thank you for this referral,  Lara Golden DPT                   HISTORY:   History of Present Injury/Illness (Reason for Referral):  Patient presents to therapy following above surgical repair. She notes a chronic history of knee pain that begun to cause limitations with her walking, kneeling and house keeping duties. She notes no complications with surgery and presents today with primary complaint of consistent aching and soreness. This is continuing to cause difficulty with sleeping, going up and down stairs, and getting in/out of the car. She is continuing to take her prescription pain medication at night to help her sleep, but has begun to taper her medication through the day. She is eager to regain her function, specifically to be able to return to a walking routine (up to 1 mile) for fitness and perform kneeling tasks. Past Medical History/Comorbidities:   Ms. Venu Meléndez  has a past medical history of Arthritis; BMI 38.0-38.9,adult; Chronic pain; GERD (gastroesophageal reflux disease); Insomnia; and Liver disease. She also has no past medical history of Adverse effect of anesthesia; Aneurysm ([address] Arrhythmia; Asthma; Autoimmune disease ([address] CAD (coronary artery disease);  Cancer ([address] Chronic kidney disease; Chronic obstructive pulmonary disease ([facility] Coagulation disorder ([facility] Diabetes ([facility] Difficult intubation; Endocarditis; Heart failure ([facility] Hypertension; Malignant hyperthermia due to anesthesia; Nausea & vomiting; Nicotine vapor product user; Non-nicotine vapor product user; Other unknown and unspecified cause of morbidity or mortality; Pseudocholinesterase deficiency; Psychiatric disorder; PUD (peptic ulcer disease); Rheumatic fever; Seizures ([facility] Stroke [facility]); Thromboembolus ([facility] Thyroid disease; or Unspecified sleep apnea. Ms. Nia Andre  has a past surgical history that includes hx heent; hx tonsillectomy (1961); hx lap cholecystectomy; hx carpal tunnel release (Left); hx carpal tunnel release (Right); hx orthopaedic (Left); hx orthopaedic (Left); hx hysterectomy; hx knee arthroscopy (Right); hx bunionectomy (Right); and hx rotator cuff repair (Right). Social History/Living Environment:     Patient lives by herself at home. She has family members that live nearby. Prior Level of Function/Work/Activity:  Patient is retired. Dominant Side:         RIGHT    Current Medications:    Current Outpatient Prescriptions:     acetaminophen (TYLENOL) 500 mg tablet, Take 2 Tabs by mouth every six (6) hours. , Disp: 120 Tab, Rfl: 0    aspirin delayed-release 325 mg tablet, Take 1 Tab by mouth every twelve (12) hours every twelve (12) hours. , Disp: 60 Tab, Rfl: 0    celecoxib (CELEBREX) 200 mg capsule, Take 1 Cap by mouth every twelve (12) hours every twelve (12) hours for 90 days. , Disp: 60 Cap, Rfl: 2    cyclobenzaprine (FLEXERIL) 10 mg tablet, Take 0.5 Tabs by mouth three (3) times daily. , Disp: 60 Tab, Rfl: 0    gabapentin (NEURONTIN) 100 mg capsule, Take 1 Cap by mouth two (2) times a day., Disp: 60 Cap, Rfl: 0    HYDROmorphone (DILAUDID) 2 mg tablet, Take 1 Tab by mouth every four (4) hours as needed.  Max Daily Amount: 12 mg., Disp: 90 Tab, Rfl: 0    ondansetron (ZOFRAN ODT) 8 mg disintegrating tablet, Take 1 Tab by mouth every eight (8) hours as needed for Nausea., Disp: 60 Tab, Rfl: 0    senna-docusate (PERICOLACE) 8.6-50 mg per tablet, Take 2 Tabs by mouth daily. , Disp: 120 Tab, Rfl: 0    zolpidem (AMBIEN) 5 mg tablet, Take 1 Tab by mouth nightly as needed for Sleep. Max Daily Amount: 5 mg., Disp: 60 Tab, Rfl: 0    omeprazole (PRILOSEC) 20 mg capsule, Take 20 mg by mouth Daily (before breakfast). Take / use AM day of surgery  per anesthesia protocols. Indications: GASTROESOPHAGEAL REFLUX, Disp: , Rfl:     multivitamin (ONE A DAY) tablet, Take 1 Tab by mouth daily. , Disp: , Rfl:     VITAMIN E, DL,TOCOPHERYL ACET, (VITAMIN E ACETATE) 400 unit cap capsule, Take 400 Units by mouth every other day., Disp: , Rfl:     magnesium oxide (MAG-OX) 400 mg tablet, Take 400 mg by mouth daily. , Disp: , Rfl:     calcium 500 mg tab, Take 1 Tab by mouth every other day., Disp: , Rfl:     b complex vitamins (B COMPLEX 1) tablet, Take 1 Tab by mouth every other day., Disp: , Rfl:     ascorbic acid (VITAMIN C) 500 mg tablet, Take 500 mg by mouth daily. , Disp: , Rfl:    Date Last Reviewed:  1/2/2018   EXAMINATION:   Observation/Orthostatic Postural Assessment:          Ambulates with delayed gait speed and right lateral trunk tilt. Palpation:          Mild tenderness present throughout the knee and along the distal iliotibial band  ROM:     Right knee: 7 to 90 ° active; 2 to 100 ° with painful, stiff end feel  Left knee: 0 to 130 °   SLR: 90 ° B  Hip extension: 5 ° B      Strength:     Knee extension: 3+/5 R; 5/5 L  Knee flexion: 4-/5 R; 5/5 L  SLR: 4-/5 R; 5/5 L  Hip abduction: 4-/5 R; 5/5 L      Special Tests:          Not applicable  Neurol\ogical Screen:        Sensation is diminished to the distal lateral knee. Intact otherwise.   Functional Mobility:         Ambulates without assistive device  Balance:          Maintains single leg stance for 5 seconds R; 10 seconds L   Body Structures Involved:  1. Nerves  2. Bones  3. Joints  4. Muscles  5. Ligaments Body Functions Affected:  1. Sensory/Pain  2. Neuromusculoskeletal  3. Movement Related Activities and Participation Affected:  1. General Tasks and Demands  2. Mobility  3. Self Care  4. Domestic Life  5. Interpersonal Interactions and Relationships  6. Community, Social and Civic Life   CLINICAL PRESENTATION:   CLINICAL DECISION MAKING:   Outcome Measure: Tool Used: Lower Extremity Functional Scale (LEFS)  Score:  Initial: 42/80 Most Recent: X/80 (Date: -- )   Interpretation of Score: 20 questions each scored on a 5 point scale with 0 representing \"extreme difficulty or unable to perform\" and 4 representing \"no difficulty\". The lower the score, the greater the functional disability. 80/80 represents no disability. Minimal detectable change is 9 points. Score 80 79-63 62-48 47-32 31-16 15-1 0   Modifier CH CI CJ CK CL CM CN     ? Mobility - Walking and Moving Around:     - CURRENT STATUS: CK - 40%-59% impaired, limited or restricted    - GOAL STATUS: CI - 1%-19% impaired, limited or restricted    - D/C STATUS:  ---------------To be determined---------------      Medical Necessity:   · Patient is expected to demonstrate progress in strength, range of motion, balance and coordination to increase independence with community mobility and return to prior level of function. Reason for Services/Other Comments:  · Patient has demonstrated an improvement in functional level by independent performance of HEP. TREATMENT:   (In addition to Assessment/Re-Assessment sessions the following treatments were rendered)  THERAPEUTIC EXERCISE: (see below for minutes):  Exercises per grid below to improve mobility, strength, balance and coordination. Required minimal verbal and manual cues to promote proper body alignment, promote proper body posture and promote proper body mechanics.   Progressed resistance, range, repetitions and complexity of movement as indicated. MANUAL THERAPY: (see below for minutes): Joint mobilization and Soft tissue mobilization was utilized and necessary because of the patient's restricted joint motion, painful spasm, loss of articular motion and restricted motion of soft tissue.    MODALITIES: (see below for minutes):      to decrease pain     Date: 12/11/17 12/13/17 (visit 2) 12/15/17 (visit 3) 12/18/17 (visit 4) 12/20/17 (visit 5) 12/27/17 (visit 6) 12/29/17 (visit 7) 01/02/18 (visit 8)   Modalities:  15 min 15 min 15 min 15 min 15 min 15 min 15 min   Ice  With IFC x 15 min 15 min with IFC Ice with IFC x 15 min Ice with IFC x 15 min Ice with IFC x 15 min Ice with IFC x 15 min Ice with IFC x15 min                         Therapeutic Exercise: 15 min 30 min 30 min 30 min 30 min 30 min 30 min 30 min   NuStep 5 min  4 min L4 5 min L4 6 min L4 5 min L4 5 min L4 5 min L4 5 min L4   Heel slides [id number]          SLR 3x10          Sit to stand 3x10 from chair          Hip abduction 3x10 Lateral band walks blue tband 2x8 laps II bars repeat repeat    Lateral band walk blue tband x 2 laps   Single leg stance [id number]   Forward step to airex [id number] Step on/off airex 3x1 min forward, 3x1 min lateral up and over Forward step up 6\" 2x10 with 5sec hold, lateral step up and over airex 3x1 min Step to airex [id number] forward, [id number] lateral Step to airex [id number] forward, up and over 20x   Stretching  [id number] to hamstring, 2x45 sec to calves at incline board repeat repeat repeat repeat [id number] to hamstring    Calf raises  2x15 incline board 2x15 incline board repeat  2x15 incline board 2x10 incline board 2x10 incline board   Step ups  6\" lateral step up 10x, with single leg stance 2x10 6\" lateral step up 10x, with single leg stance 2x10, forward 6\" 3x10 3x10 lateral, 2x15 forward 6\" each 3x10 lateral 6\" 3x10 lateral 6\"   6\" 3x10 lateral to single leg stance, 10\" 3x10 fowrward   Hamstring curls  4# 4x5 4# 4x5 4# 2x12  4# 3x15 repeat repeat LAQ   5# 3x15 5# 3x15 5# 3x15 5# 3x15 repeat repeat   Walking march     x1 lap      Walking butt kick     x1 lap      Bridge       2x10 2x10 2x10   Lower trunk rotation      20x     Hip extension      Standing blue tband 2x10     Split squat       To 2 airex 3x10    Proprioceptive Activities:                                            Manual Therapy:  15 min 15 min 15 min 15 min 15 min 15 min 15 min   Passive physiologic mobilizations Gr 3 to 4 into flexion and extension performed Repeat, \"stick\" to anterior and lateral thigh, patellar mobilizations repeat repeat \"stick\" to anterior and lateral thigh, PROM into extension and flexion repeat repeat Repeat, MET to SI joint performed              Therapeutic Activities:                                                       HEP: see 12/11/17 flow sheet for specifics. Treatment/Session Assessment:  No change in low back pain following MET. Progressed with step height to improve quad strength necessary for controlled stair descent. Plan to continue x 2 visits for another week. · Pain/ Symptoms: Initial:   3/10 \"The knee is feeling pretty good. I still have difficulty with going down stairs normally. The back is really giving me more trouble. It just hurts to have pressure on it when I'm laying down. \" Post Session:  No increase following today's treatment session ·   Compliance with Program/Exercises: Will assess as treatment progresses. · Recommendations/Intent for next treatment session: \"Next visit will focus on advancements to more challenging activities\".   Total Treatment Duration:   PT Patient Time In/Time Out  Time In: 1015  Time Out: [address], DPT

## 2018-01-03 PROCEDURE — 3331090002 HH PPS REVENUE DEBIT

## 2018-01-03 PROCEDURE — 3331090001 HH PPS REVENUE CREDIT

## 2018-01-04 ENCOUNTER — HOSPITAL ENCOUNTER (OUTPATIENT)
Dept: PHYSICAL THERAPY | Age: 68
Discharge: HOME | End: 2018-01-04
Payer: MEDICARE

## 2018-01-04 PROCEDURE — 97140 MANUAL THERAPY 1/> REGIONS: CPT

## 2018-01-04 PROCEDURE — 3331090002 HH PPS REVENUE DEBIT

## 2018-01-04 PROCEDURE — 97014 ELECTRIC STIMULATION THERAPY: CPT

## 2018-01-04 PROCEDURE — 97110 THERAPEUTIC EXERCISES: CPT

## 2018-01-04 PROCEDURE — 3331090001 HH PPS REVENUE CREDIT

## 2018-01-04 NOTE — PROGRESS NOTES
[place]  : [id number] [facility],[address]  [address] [place] [address].  [phone]   [phone]        OUTPATIENT PHYSICAL THERAPY:Daily Note 2018        ICD-10: Treatment Diagnosis: Pain in right knee (M25.561); Difficulty in walking, not elsewhere classified (R26.2)  Precautions/Allergies:   Sulfa (sulfonamide antibiotics); Trazodone; and Ultram [tramadol]   Fall Risk Score: 0 (? 5 = High Risk)  MD Orders: evaluate and treat MEDICAL/REFERRING DIAGNOSIS:  Status post total right knee replacement [Z96.651]   DATE OF ONSET: date of surgery: 11/15/17  REFERRING PHYSICIAN: Lucero Mehta MD  RETURN PHYSICIAN APPOINTMENT: 17     INITIAL ASSESSMENT:  Ms. Cornelio Lagos presents to therapy following right total knee arthroplasty. She has restricted joint mobility into extension and flexion secondary to increased stiffness, swelling and pain along with diminished flexibility of the quadriceps and hamstring musculature. She demonstrates impaired strength throughout the leg, specifically through the quadriceps and lateral hip musculature. This causes difficulty with all weight bearing and mobility tasks. Skilled therapy is required to return to prior level of function. PROBLEM LIST (Impacting functional limitations):  1. Decreased Strength  2. Decreased ADL/Functional Activities  3. Decreased Ambulation Ability/Technique  4. Decreased Balance  5. Increased Pain  6. Decreased Activity Tolerance  7. Decreased Flexibility/Joint Mobility INTERVENTIONS PLANNED:  1. Balance Exercise  2. Cryotherapy  3. Electrical Stimulation  4. Gait Training  5. Home Exercise Program (HEP)  6. Manual Therapy  7. Neuromuscular Re-education/Strengthening  8. Range of Motion (ROM)  9. Therapeutic Activites  10. Therapeutic Exercise/Strengthening  11. modalities   TREATMENT PLAN:  Effective Dates: 17 TO 18.   Frequency/Duration: 3 times a week for 12 weeks  GOALS: (Goals have been discussed and agreed upon with patient.)  Short-Term Functional Goals: Time Frame: 2 weeks  1. Patient will be independent with HEP. 2. Patient will report pain <3/10 with daily activity. Discharge Goals: Time Frame: 12 weeks  1. Patient will report no pain with daily activity. 2. Patient will improve knee flexion to 125 ° to restore mobility required for reciprocal gait descending stairs. 3. Patient will demonstrate 5/5 strength into knee extension to enable walking up to a mile to return to prior level of function. Rehabilitation Potential For Stated Goals: Excellent  Regarding Bri Brookserwin therapy, I certify that the treatment plan above will be carried out by a therapist or under their direction. Thank you for this referral,  Martha Contreras DPT                   HISTORY:   History of Present Injury/Illness (Reason for Referral):  Patient presents to therapy following above surgical repair. She notes a chronic history of knee pain that begun to cause limitations with her walking, kneeling and house keeping duties. She notes no complications with surgery and presents today with primary complaint of consistent aching and soreness. This is continuing to cause difficulty with sleeping, going up and down stairs, and getting in/out of the car. She is continuing to take her prescription pain medication at night to help her sleep, but has begun to taper her medication through the day. She is eager to regain her function, specifically to be able to return to a walking routine (up to 1 mile) for fitness and perform kneeling tasks. Past Medical History/Comorbidities:   Ms. Bhavna Marks  has a past medical history of Arthritis; BMI 38.0-38.9,adult; Chronic pain; GERD (gastroesophageal reflux disease); Insomnia; and Liver disease. She also has no past medical history of Adverse effect of anesthesia; Aneurysm ([address] Arrhythmia; Asthma; Autoimmune disease ([address] CAD (coronary artery disease);  Cancer ([address] Chronic kidney disease; Chronic obstructive pulmonary disease ([facility] Coagulation disorder ([facility] Diabetes ([facility] Difficult intubation; Endocarditis; Heart failure ([facility] Hypertension; Malignant hyperthermia due to anesthesia; Nausea & vomiting; Nicotine vapor product user; Non-nicotine vapor product user; Other unknown and unspecified cause of morbidity or mortality; Pseudocholinesterase deficiency; Psychiatric disorder; PUD (peptic ulcer disease); Rheumatic fever; Seizures ([facility] Stroke [facility]); Thromboembolus ([facility] Thyroid disease; or Unspecified sleep apnea. Ms. Kelley Marquez  has a past surgical history that includes hx heent; hx tonsillectomy (1961); hx lap cholecystectomy; hx carpal tunnel release (Left); hx carpal tunnel release (Right); hx orthopaedic (Left); hx orthopaedic (Left); hx hysterectomy; hx knee arthroscopy (Right); hx bunionectomy (Right); and hx rotator cuff repair (Right). Social History/Living Environment:     Patient lives by herself at home. She has family members that live nearby. Prior Level of Function/Work/Activity:  Patient is retired. Dominant Side:         RIGHT    Current Medications:    Current Outpatient Prescriptions:     acetaminophen (TYLENOL) 500 mg tablet, Take 2 Tabs by mouth every six (6) hours. , Disp: 120 Tab, Rfl: 0    aspirin delayed-release 325 mg tablet, Take 1 Tab by mouth every twelve (12) hours every twelve (12) hours. , Disp: 60 Tab, Rfl: 0    celecoxib (CELEBREX) 200 mg capsule, Take 1 Cap by mouth every twelve (12) hours every twelve (12) hours for 90 days. , Disp: 60 Cap, Rfl: 2    cyclobenzaprine (FLEXERIL) 10 mg tablet, Take 0.5 Tabs by mouth three (3) times daily. , Disp: 60 Tab, Rfl: 0    gabapentin (NEURONTIN) 100 mg capsule, Take 1 Cap by mouth two (2) times a day., Disp: 60 Cap, Rfl: 0    HYDROmorphone (DILAUDID) 2 mg tablet, Take 1 Tab by mouth every four (4) hours as needed.  Max Daily Amount: 12 mg., Disp: 90 Tab, Rfl: 0    ondansetron (ZOFRAN ODT) 8 mg disintegrating tablet, Take 1 Tab by mouth every eight (8) hours as needed for Nausea., Disp: 60 Tab, Rfl: 0    senna-docusate (PERICOLACE) 8.6-50 mg per tablet, Take 2 Tabs by mouth daily. , Disp: 120 Tab, Rfl: 0    zolpidem (AMBIEN) 5 mg tablet, Take 1 Tab by mouth nightly as needed for Sleep. Max Daily Amount: 5 mg., Disp: 60 Tab, Rfl: 0    omeprazole (PRILOSEC) 20 mg capsule, Take 20 mg by mouth Daily (before breakfast). Take / use AM day of surgery  per anesthesia protocols. Indications: GASTROESOPHAGEAL REFLUX, Disp: , Rfl:     multivitamin (ONE A DAY) tablet, Take 1 Tab by mouth daily. , Disp: , Rfl:     VITAMIN E, DL,TOCOPHERYL ACET, (VITAMIN E ACETATE) 400 unit cap capsule, Take 400 Units by mouth every other day., Disp: , Rfl:     magnesium oxide (MAG-OX) 400 mg tablet, Take 400 mg by mouth daily. , Disp: , Rfl:     calcium 500 mg tab, Take 1 Tab by mouth every other day., Disp: , Rfl:     b complex vitamins (B COMPLEX 1) tablet, Take 1 Tab by mouth every other day., Disp: , Rfl:     ascorbic acid (VITAMIN C) 500 mg tablet, Take 500 mg by mouth daily. , Disp: , Rfl:    Date Last Reviewed:  1/4/2018   EXAMINATION:   Observation/Orthostatic Postural Assessment:          Ambulates with delayed gait speed and right lateral trunk tilt. Palpation:          Mild tenderness present throughout the knee and along the distal iliotibial band  ROM:     Right knee: 7 to 90 ° active; 2 to 100 ° with painful, stiff end feel  Left knee: 0 to 130 °   SLR: 90 ° B  Hip extension: 5 ° B      Strength:     Knee extension: 3+/5 R; 5/5 L  Knee flexion: 4-/5 R; 5/5 L  SLR: 4-/5 R; 5/5 L  Hip abduction: 4-/5 R; 5/5 L      Special Tests:          Not applicable  Neurol\ogical Screen:        Sensation is diminished to the distal lateral knee. Intact otherwise.   Functional Mobility:         Ambulates without assistive device  Balance:          Maintains single leg stance for 5 seconds R; 10 seconds L   Body Structures Involved:  1. Nerves  2. Bones  3. Joints  4. Muscles  5. Ligaments Body Functions Affected:  1. Sensory/Pain  2. Neuromusculoskeletal  3. Movement Related Activities and Participation Affected:  1. General Tasks and Demands  2. Mobility  3. Self Care  4. Domestic Life  5. Interpersonal Interactions and Relationships  6. Community, Social and Civic Life   CLINICAL PRESENTATION:   CLINICAL DECISION MAKING:   Outcome Measure: Tool Used: Lower Extremity Functional Scale (LEFS)  Score:  Initial: 42/80 Most Recent: X/80 (Date: -- )   Interpretation of Score: 20 questions each scored on a 5 point scale with 0 representing \"extreme difficulty or unable to perform\" and 4 representing \"no difficulty\". The lower the score, the greater the functional disability. 80/80 represents no disability. Minimal detectable change is 9 points. Score 80 79-63 62-48 47-32 31-16 15-1 0   Modifier CH CI CJ CK CL CM CN     ? Mobility - Walking and Moving Around:     - CURRENT STATUS: CK - 40%-59% impaired, limited or restricted    - GOAL STATUS: CI - 1%-19% impaired, limited or restricted    - D/C STATUS:  ---------------To be determined---------------      Medical Necessity:   · Patient is expected to demonstrate progress in strength, range of motion, balance and coordination to increase independence with community mobility and return to prior level of function. Reason for Services/Other Comments:  · Patient has demonstrated an improvement in functional level by independent performance of HEP. TREATMENT:   (In addition to Assessment/Re-Assessment sessions the following treatments were rendered)  THERAPEUTIC EXERCISE: (see below for minutes):  Exercises per grid below to improve mobility, strength, balance and coordination. Required minimal verbal and manual cues to promote proper body alignment, promote proper body posture and promote proper body mechanics.   Progressed resistance, range, repetitions and complexity of movement as indicated. MANUAL THERAPY: (see below for minutes): Joint mobilization and Soft tissue mobilization was utilized and necessary because of the patient's restricted joint motion, painful spasm, loss of articular motion and restricted motion of soft tissue.    MODALITIES: (see below for minutes):      to decrease pain     Date: 12/11/17 12/13/17 (visit 2) 12/15/17 (visit 3) 12/18/17 (visit 4) 12/20/17 (visit 5) 12/27/17 (visit 6) 12/29/17 (visit 7) 01/02/18 (visit 8) 01/04/18 (visit 9)   Modalities:  15 min 15 min 15 min 15 min 15 min 15 min 15 min 15 min   Ice  With IFC x 15 min 15 min with IFC Ice with IFC x 15 min Ice with IFC x 15 min Ice with IFC x 15 min Ice with IFC x 15 min Ice with IFC x15 min Ice with IFC x 15 min                           Therapeutic Exercise: 15 min 30 min 30 min 30 min 30 min 30 min 30 min 30 min 30 min   NuStep 5 min  4 min L4 5 min L4 6 min L4 5 min L4 5 min L4 5 min L4 5 min L4 5 min L4   Heel slides [id number]           SLR 3x10           Sit to stand 3x10 from chair           Hip abduction 3x10 Lateral band walks blue tband 2x8 laps II bars repeat repeat    Lateral band walk blue tband x 2 laps Lateral band walk blue tband x 2 laps   Single leg stance [id number]   Forward step to airex [id number] Step on/off airex 3x1 min forward, 3x1 min lateral up and over Forward step up 6\" 2x10 with 5sec hold, lateral step up and over airex 3x1 min Step to airex [id number] forward, [id number] lateral Step to airex [id number] forward, up and over 20x Step to airex 2x10 with 5sec hold   Stretching  [id number] to hamstring, 2x45 sec to calves at incline board repeat repeat repeat repeat [id number] to hamstring  [id number] to hamstring and ITB   Calf raises  2x15 incline board 2x15 incline board repeat  2x15 incline board 2x10 incline board 2x10 incline board 2x15 incline board   Step ups  6\" lateral step up 10x, with single leg stance 2x10 6\" lateral step up 10x, with single leg stance 2x10, forward 6\" 3x10 3x10 lateral, 2x15 forward 6\" each 3x10 lateral 6\" 3x10 lateral 6\"   6\" 3x10 lateral to single leg stance, 10\" 3x10 fowrward 10\" forward 3x10; lateral up and over 6\" 3x10   Hamstring curls  4# 4x5 4# 4x5 4# 2x12  4# 3x15 repeat repeat repeat   LAQ   5# 3x15 5# 3x15 5# 3x15 5# 3x15 repeat repeat 6# 3x15   Walking march     x1 lap       Walking butt kick     x1 lap       Bridge       2x10 2x10 2x10    Lower trunk rotation      20x      Hip extension      Standing blue tband 2x10      Split squat       To 2 airex 3x10     Proprioceptive Activities:                                                Manual Therapy:  15 min 15 min 15 min 15 min 15 min 15 min 15 min 15 min   Passive physiologic mobilizations Gr 3 to 4 into flexion and extension performed Repeat, \"stick\" to anterior and lateral thigh, patellar mobilizations repeat repeat \"stick\" to anterior and lateral thigh, PROM into extension and flexion repeat repeat Repeat, MET to SI joint performed repeat               Therapeutic Activities:                                                            HEP: see 12/11/17 flow sheet for specifics. Treatment/Session Assessment:  Patient performs all exercises with good technique. · Pain/ Symptoms: Initial:   3/10 \"The back is feeling about the same. It is getting easier with going down stairs. \" Post Session:  No increase following today's treatment session ·   Compliance with Program/Exercises: Will assess as treatment progresses. · Recommendations/Intent for next treatment session: \"Next visit will focus on advancements to more challenging activities\".   Total Treatment Duration:   PT Patient Time In/Time Out  Time In: 1015  Time Out: [address], DPT

## 2018-01-05 PROCEDURE — 3331090002 HH PPS REVENUE DEBIT

## 2018-01-05 PROCEDURE — 3331090001 HH PPS REVENUE CREDIT

## 2018-01-06 PROCEDURE — 3331090001 HH PPS REVENUE CREDIT

## 2018-01-06 PROCEDURE — 3331090002 HH PPS REVENUE DEBIT

## 2018-01-07 PROCEDURE — 3331090002 HH PPS REVENUE DEBIT

## 2018-01-07 PROCEDURE — 3331090001 HH PPS REVENUE CREDIT

## 2018-01-08 ENCOUNTER — HOSPITAL ENCOUNTER (OUTPATIENT)
Dept: PHYSICAL THERAPY | Age: 68
Discharge: HOME | End: 2018-01-08
Payer: MEDICARE

## 2018-01-08 PROCEDURE — 97014 ELECTRIC STIMULATION THERAPY: CPT

## 2018-01-08 PROCEDURE — 3331090001 HH PPS REVENUE CREDIT

## 2018-01-08 PROCEDURE — G8978 MOBILITY CURRENT STATUS: HCPCS

## 2018-01-08 PROCEDURE — 97140 MANUAL THERAPY 1/> REGIONS: CPT

## 2018-01-08 PROCEDURE — 97110 THERAPEUTIC EXERCISES: CPT

## 2018-01-08 PROCEDURE — 3331090002 HH PPS REVENUE DEBIT

## 2018-01-08 PROCEDURE — G8979 MOBILITY GOAL STATUS: HCPCS

## 2018-01-08 NOTE — PROGRESS NOTES
Peggy Player  : 1950 [address]  [address] [address].  [phone]   [phone]        OUTPATIENT PHYSICAL THERAPY:Daily Note and Progress Report 2018        ICD-10: Treatment Diagnosis: Pain in right knee (M25.561); Difficulty in walking, not elsewhere classified (R26.2)  Precautions/Allergies:   Sulfa (sulfonamide antibiotics); Trazodone; and Ultram [tramadol]   Fall Risk Score: 0 (? 5 = High Risk)  MD Orders: evaluate and treat MEDICAL/REFERRING DIAGNOSIS:  Status post total right knee replacement [Z96.651]   DATE OF ONSET: date of surgery: 11/15/17  REFERRING PHYSICIAN: Lindsay Donahue MD  RETURN PHYSICIAN APPOINTMENT: 17     INITIAL ASSESSMENT:  Ms. Bob Davis presents to therapy following right total knee arthroplasty. She has restricted joint mobility into extension and flexion secondary to increased stiffness, swelling and pain along with diminished flexibility of the quadriceps and hamstring musculature. She demonstrates impaired strength throughout the leg, specifically through the quadriceps and lateral hip musculature. This causes difficulty with all weight bearing and mobility tasks. Skilled therapy is required to return to prior level of function. PROBLEM LIST (Impacting functional limitations):  1. Decreased Strength  2. Decreased ADL/Functional Activities  3. Decreased Ambulation Ability/Technique  4. Decreased Balance  5. Increased Pain  6. Decreased Activity Tolerance  7. Decreased Flexibility/Joint Mobility INTERVENTIONS PLANNED:  1. Balance Exercise  2. Cryotherapy  3. Electrical Stimulation  4. Gait Training  5. Home Exercise Program (HEP)  6. Manual Therapy  7. Neuromuscular Re-education/Strengthening  8. Range of Motion (ROM)  9. Therapeutic Activites  10. Therapeutic Exercise/Strengthening  11. modalities   TREATMENT PLAN:  Effective Dates: 17 TO 18.   Frequency/Duration: 3 times a week for 12 weeks  GOALS: (Goals have been discussed and agreed upon with patient.)  Short-Term Functional Goals: Time Frame: 2 weeks  1. Patient will be independent with HEP. MET  2. Patient will report pain <3/10 with daily activity. MET  Discharge Goals: Time Frame: 12 weeks  1. Patient will report no pain with daily activity. NOT MET  2. Patient will improve knee flexion to 125 ° to restore mobility required for reciprocal gait descending stairs. NOT MET  3. Patient will demonstrate 5/5 strength into knee extension to enable walking up to a mile to return to prior level of function. PROGRESSING  Rehabilitation Potential For Stated Goals: Excellent  [facility], I certify that the treatment plan above will be carried out by a therapist or under their direction. Thank you for this referral,  Maxwell Loja DPT                   HISTORY:   History of Present Injury/Illness (Reason for Referral):  Patient presents to therapy following above surgical repair. She notes a chronic history of knee pain that begun to cause limitations with her walking, kneeling and house keeping duties. She notes no complications with surgery and presents today with primary complaint of consistent aching and soreness. This is continuing to cause difficulty with sleeping, going up and down stairs, and getting in/out of the car. She is continuing to take her prescription pain medication at night to help her sleep, but has begun to taper her medication through the day. She is eager to regain her function, specifically to be able to return to a walking routine (up to 1 mile) for fitness and perform kneeling tasks. Past Medical History/Comorbidities:   Ms. Sondra Martins  has a past medical history of Arthritis; BMI 38.0-38.9,adult; Chronic pain; GERD (gastroesophageal reflux disease); Insomnia; and Liver disease. She also has no past medical history of Adverse effect of anesthesia; Aneurysm ([address] Arrhythmia; Asthma;  Autoimmune disease [facility]); CAD (coronary artery disease); Cancer ([facility] [facility] Chronic kidney disease; Chronic obstructive pulmonary disease ([facility] [address] Co[facility] disorder [facility] Diabetes [facility] Difficult intubation; Endocarditis; Heart failure ([facility] Hypertension; Malignant hyperthermia due to anesthesia; Nausea & vomiting; Nicotine vapor product user; Non-nicotine vapor product user; Other unknown and unspecified cause of morbidity or mortality; Pseudocholinesterase deficiency; Psychiatric disorder; PUD (peptic ulcer disease); Rheumatic fever; Seizures ([facility] Stroke [facility]); Thromboembolus ([facility] Thyroid disease; or Unspecified sleep apnea. Ms. Raúl Stratton  has a past surgical history that includes hx heent; hx tonsillectomy (1961); hx lap cholecystectomy; hx carpal tunnel release (Left); hx carpal tunnel release (Right); hx orthopaedic (Left); hx orthopaedic (Left); hx hysterectomy; hx knee arthroscopy (Right); hx bunionectomy (Right); and hx rotator cuff repair (Right). Social History/Living Environment:     Patient lives by herself at home. She has family members that live nearby. Prior Level of Function/Work/Activity:  Patient is retired. Dominant Side:         RIGHT    Current Medications:    Current Outpatient Prescriptions:     acetaminophen (TYLENOL) 500 mg tablet, Take 2 Tabs by mouth every six (6) hours. , Disp: 120 Tab, Rfl: 0    aspirin delayed-release 325 mg tablet, Take 1 Tab by mouth every twelve (12) hours every twelve (12) hours. , Disp: 60 Tab, Rfl: 0    celecoxib (CELEBREX) 200 mg capsule, Take 1 Cap by mouth every twelve (12) hours every twelve (12) hours for 90 days. , Disp: 60 Cap, Rfl: 2    cyclobenzaprine (FLEXERIL) 10 mg tablet, Take 0.5 Tabs by mouth three (3) times daily. , Disp: 60 Tab, Rfl: 0    gabapentin (NEURONTIN) 100 mg capsule, Take 1 Cap by mouth two (2) times a day., Disp: 60 Cap, Rfl: 0    HYDROmorphone (DILAUDID) 2 mg tablet, Take 1 Tab by mouth every four (4) hours as needed.  Max Daily Amount: 12 mg., Disp: 90 Tab, Rfl: 0    ondansetron (ZOFRAN ODT) 8 mg disintegrating tablet, Take 1 Tab by mouth every eight (8) hours as needed for Nausea., Disp: 60 Tab, Rfl: 0    senna-docusate (PERICOLACE) 8.6-50 mg per tablet, Take 2 Tabs by mouth daily. , Disp: 120 Tab, Rfl: 0    zolpidem (AMBIEN) 5 mg tablet, Take 1 Tab by mouth nightly as needed for Sleep. Max Daily Amount: 5 mg., Disp: 60 Tab, Rfl: 0    omeprazole (PRILOSEC) 20 mg capsule, Take 20 mg by mouth Daily (before breakfast). Take / use AM day of surgery  per anesthesia protocols. Indications: GASTROESOPHAGEAL REFLUX, Disp: , Rfl:     multivitamin (ONE A DAY) tablet, Take 1 Tab by mouth daily. , Disp: , Rfl:     VITAMIN E, DL,TOCOPHERYL ACET, (VITAMIN E ACETATE) 400 unit cap capsule, Take 400 Units by mouth every other day., Disp: , Rfl:     magnesium oxide (MAG-OX) 400 mg tablet, Take 400 mg by mouth daily. , Disp: , Rfl:     calcium 500 mg tab, Take 1 Tab by mouth every other day., Disp: , Rfl:     b complex vitamins (B COMPLEX 1) tablet, Take 1 Tab by mouth every other day., Disp: , Rfl:     ascorbic acid (VITAMIN C) 500 mg tablet, Take 500 mg by mouth daily. , Disp: , Rfl:    Date Last Reviewed:  1/8/2018   EXAMINATION:   Observation/Orthostatic Postural Assessment:          Ambulates with delayed gait speed and right lateral trunk tilt. Palpation:          Mild tenderness present throughout the knee and along the distal iliotibial band  ROM:     Right knee: 7 to 90 ° active; 2 to 100 ° with painful, stiff end feel  Left knee: 0 to 130 °   SLR: 90 ° B  Hip extension: 5 ° B   01/08/18 update:   0 to 120 ° with tight end feel into flexion R   Strength:     Knee extension: 3+/5 R; 5/5 L  Knee flexion: 4-/5 R; 5/5 L  SLR: 4-/5 R; 5/5 L  Hip abduction: 4-/5 R; 5/5 L   01/08/18 update:   Knee extension: 4/5  Knee flexion: 4+/5   SLR: 4+/5    Special Tests:          Not applicable  Neurol\ogical Screen:        Sensation is diminished to the distal lateral knee. Intact otherwise. Functional Mobility:         Ambulates without assistive device  Balance:          Maintains single leg stance for 5 seconds R; 10 seconds L   Body Structures Involved:  1. Nerves  2. Bones  3. Joints  4. Muscles  5. Ligaments Body Functions Affected:  1. Sensory/Pain  2. Neuromusculoskeletal  3. Movement Related Activities and Participation Affected:  1. General Tasks and Demands  2. Mobility  3. Self Care  4. Domestic Life  5. Interpersonal Interactions and Relationships  6. Community, Social and Civic Life   CLINICAL PRESENTATION:   CLINICAL DECISION MAKING:   Outcome Measure: Tool Used: Lower Extremity Functional Scale (LEFS)  Score:  Initial: 42/80 Most Recent: 60/80 (Date: 01/08/18 )   Interpretation of Score: 20 questions each scored on a 5 point scale with 0 representing \"extreme difficulty or unable to perform\" and 4 representing \"no difficulty\". The lower the score, the greater the functional disability. 80/80 represents no disability. Minimal detectable change is 9 points. Score 80 79-63 62-48 47-32 31-16 15-1 0   Modifier CH CI CJ CK CL CM CN     ? Mobility - Walking and Moving Around:     - CURRENT STATUS: CJ - 20%-39% impaired, limited or restricted    - GOAL STATUS: CI - 1%-19% impaired, limited or restricted    - D/C STATUS:  ---------------To be determined---------------      Medical Necessity:   · Patient is expected to demonstrate progress in strength, range of motion, balance and coordination to increase independence with community mobility and return to prior level of function. Reason for Services/Other Comments:  · Patient has demonstrated an improvement in functional level by independent performance of HEP. TREATMENT:   (In addition to Assessment/Re-Assessment sessions the following treatments were rendered)  THERAPEUTIC EXERCISE: (see below for minutes):  Exercises per grid below to improve mobility, strength, balance and coordination.   Required minimal verbal and manual cues to promote proper body alignment, promote proper body posture and promote proper body mechanics. Progressed resistance, range, repetitions and complexity of movement as indicated. MANUAL THERAPY: (see below for minutes): Joint mobilization and Soft tissue mobilization was utilized and necessary because of the patient's restricted joint motion, painful spasm, loss of articular motion and restricted motion of soft tissue.    MODALITIES: (see below for minutes):      to decrease pain     Date: 12/11/17 12/13/17 (visit 2) 12/15/17 (visit 3) 12/18/17 (visit 4) 12/20/17 (visit 5) 12/27/17 (visit 6) 12/29/17 (visit 7) 01/02/18 (visit 8) 01/04/18 (visit 9) 01/08/18 (visit 10)   Modalities:  15 min 15 min 15 min 15 min 15 min 15 min 15 min 15 min 15 min   Ice  With IFC x 15 min 15 min with IFC Ice with IFC x 15 min Ice with IFC x 15 min Ice with IFC x 15 min Ice with IFC x 15 min Ice with IFC x15 min Ice with IFC x 15 min Ice with IFC x 15 min                             Therapeutic Exercise: 15 min 30 min 30 min 30 min 30 min 30 min 30 min 30 min 30 min 30 min   NuStep 5 min  4 min L4 5 min L4 6 min L4 5 min L4 5 min L4 5 min L4 5 min L4 5 min L4 5 min L4   Heel slides [id number]            SLR 3x10            Sit to stand 3x10 from chair            Hip abduction 3x10 Lateral band walks blue tband 2x8 laps II bars repeat repeat    Lateral band walk blue tband x 2 laps Lateral band walk blue tband x 2 laps Lateral band walk x 1 lap blue   Single leg stance [id number]   Forward step to airex [id number] Step on/off airex 3x1 min forward, 3x1 min lateral up and over Forward step up 6\" 2x10 with 5sec hold, lateral step up and over airex 3x1 min Step to airex [id number] forward, [id number] lateral Step to airex [id number] forward, up and over 20x Step to airex 2x10 with 5sec hold Step to airex 20x with 5sec hold   Stretching  [id number] to hamstring, 2x45 sec to calves at incline board repeat repeat repeat repeat [id number] to hamstring  [id number] to hamstring and ITB [id number] to hamstring, ITB, hip flexor   Calf raises  2x15 incline board 2x15 incline board repeat  2x15 incline board 2x10 incline board 2x10 incline board 2x15 incline board 2x15 incline board   Step ups  6\" lateral step up 10x, with single leg stance 2x10 6\" lateral step up 10x, with single leg stance 2x10, forward 6\" 3x10 3x10 lateral, 2x15 forward 6\" each 3x10 lateral 6\" 3x10 lateral 6\"   6\" 3x10 lateral to single leg stance, 10\" 3x10 fowrward 10\" forward 3x10; lateral up and over 6\" 3x10 10\" forward 3x10; lateral up and over 6\" 3x10   Hamstring curls  4# 4x5 4# 4x5 4# 2x12  4# 3x15 repeat repeat repeat 4# 3x15   LAQ   5# 3x15 5# 3x15 5# 3x15 5# 3x15 repeat repeat 6# 3x15 6# 3x15   Walking march     x1 lap        Walking butt kick     x1 lap        Bridge       2x10 2x10 2x10  2x10; clamshell 3x10   Lower trunk rotation      20x       Hip extension      Standing blue tband 2x10    Blue tband 3x12   Split squat       To 2 airex 3x10      Proprioceptive Activities:                                                    Manual Therapy:  15 min 15 min 15 min 15 min 15 min 15 min 15 min 15 min 15 min   Passive physiologic mobilizations Gr 3 to 4 into flexion and extension performed Repeat, \"stick\" to anterior and lateral thigh, patellar mobilizations repeat repeat \"stick\" to anterior and lateral thigh, PROM into extension and flexion repeat repeat Repeat, MET to SI joint performed repeat Gr 3 to 3++ into extension and flexion                Therapeutic Activities:                                                                 HEP: see 12/11/17 flow sheet for specifics. Treatment/Session Assessment:  Continues to have difficulty with high demand activities at the knee, but otherwise demonstrates good strength and stability with daily mobility tasks. Plan to continue x 1 visit to further progress HEP.      · Pain/ Symptoms: Initial:   3/10 \"The back still seems about the same. The knee is feeling much stronger. I think I may get out and do some yardwork later this week. \" Post Session:  No increase following today's treatment session ·   Compliance with Program/Exercises: Will assess as treatment progresses. · Recommendations/Intent for next treatment session: \"Next visit will focus on advancements to more challenging activities\".   Total Treatment Duration:   PT Patient Time In/Time Out  Time In: 1015  Time Out: [address], DPT

## 2018-01-09 PROCEDURE — 3331090002 HH PPS REVENUE DEBIT

## 2018-01-09 PROCEDURE — 3331090001 HH PPS REVENUE CREDIT

## 2018-01-10 ENCOUNTER — HOSPITAL ENCOUNTER (OUTPATIENT)
Dept: PHYSICAL THERAPY | Age: 68
Discharge: HOME | End: 2018-01-10
Payer: MEDICARE

## 2018-01-10 PROCEDURE — 3331090002 HH PPS REVENUE DEBIT

## 2018-01-10 PROCEDURE — G8980 MOBILITY D/C STATUS: HCPCS

## 2018-01-10 PROCEDURE — 3331090001 HH PPS REVENUE CREDIT

## 2018-01-10 PROCEDURE — G8979 MOBILITY GOAL STATUS: HCPCS

## 2018-01-10 PROCEDURE — 97014 ELECTRIC STIMULATION THERAPY: CPT

## 2018-01-10 PROCEDURE — 97140 MANUAL THERAPY 1/> REGIONS: CPT

## 2018-01-10 PROCEDURE — G8978 MOBILITY CURRENT STATUS: HCPCS

## 2018-01-10 PROCEDURE — 97110 THERAPEUTIC EXERCISES: CPT

## 2018-01-10 NOTE — PROGRESS NOTES
Joshua Dietz  : [id number] [address].  [phone]   [phone]        OUTPATIENT PHYSICAL THERAPY:Daily Note and Discharge 1/10/2018        ICD-10: Treatment Diagnosis: Pain in right knee (M25.561); Difficulty in walking, not elsewhere classified (R26.2)  Precautions/Allergies:   Sulfa (sulfonamide antibiotics); Trazodone; and Ultram [tramadol]   Fall Risk Score: 0 (? 5 = High Risk)  MD Orders: evaluate and treat MEDICAL/REFERRING DIAGNOSIS:  Status post total right knee replacement [Z96.651]   DATE OF ONSET: date of surgery: 11/15/17  REFERRING PHYSICIAN: Rian Padron MD  RETURN PHYSICIAN APPOINTMENT: 17     INITIAL ASSESSMENT:  Ms. Rachel Coleman presents to therapy following right total knee arthroplasty. She has restricted joint mobility into extension and flexion secondary to increased stiffness, swelling and pain along with diminished flexibility of the quadriceps and hamstring musculature. She demonstrates impaired strength throughout the leg, specifically through the quadriceps and lateral hip musculature. This causes difficulty with all weight bearing and mobility tasks. Skilled therapy is required to return to prior level of function. PROBLEM LIST (Impacting functional limitations):  1. Decreased Strength  2. Decreased ADL/Functional Activities  3. Decreased Ambulation Ability/Technique  4. Decreased Balance  5. Increased Pain  6. Decreased Activity Tolerance  7. Decreased Flexibility/Joint Mobility INTERVENTIONS PLANNED:  1. Balance Exercise  2. Cryotherapy  3. Electrical Stimulation  4. Gait Training  5. Home Exercise Program (HEP)  6. Manual Therapy  7. Neuromuscular Re-education/Strengthening  8. Range of Motion (ROM)  9. Therapeutic Activites  10. Therapeutic Exercise/Strengthening  11. modalities   TREATMENT PLAN:  Effective Dates: 17 TO 18.   Frequency/Duration: 3 times a week for 12 weeks  GOALS: (Goals have been discussed and agreed upon with patient.)  Short-Term Functional Goals: Time Frame: 2 weeks  1. Patient will be independent with HEP. MET  2. Patient will report pain <3/10 with daily activity. MET  Discharge Goals: Time Frame: 12 weeks  1. Patient will report no pain with daily activity. PROGRESSING  2. Patient will improve knee flexion to 125 ° to restore mobility required for reciprocal gait descending stairs. MET  3. Patient will demonstrate 5/5 strength into knee extension to enable walking up to a mile to return to prior level of function. PROGRESSING  Rehabilitation Potential For Stated Goals: Excellent  Regarding Chelita Clifford therapy, I certify that the treatment plan above will be carried out by a therapist or under their direction. Thank you for this referral,  Jessica Cisse DPT                   HISTORY:   History of Present Injury/Illness (Reason for Referral):  Patient presents to therapy following above surgical repair. She notes a chronic history of knee pain that begun to cause limitations with her walking, kneeling and house keeping duties. She notes no complications with surgery and presents today with primary complaint of consistent aching and soreness. This is continuing to cause difficulty with sleeping, going up and down stairs, and getting in/out of the car. She is continuing to take her prescription pain medication at night to help her sleep, but has begun to taper her medication through the day. She is eager to regain her function, specifically to be able to return to a walking routine (up to 1 mile) for fitness and perform kneeling tasks. Past Medical History/Comorbidities:   Ms. Senia Desai  has a past medical history of Arthritis; BMI 38.0-38.9,adult; Chronic pain; GERD (gastroesophageal reflux disease); Insomnia; and Liver disease. She also has no past medical history of Adverse effect of anesthesia; Aneurysm ([address] Arrhythmia; Asthma;  Autoimmune disease [facility]); CAD (coronary artery disease); Cancer ([facility] Chronic kidney disease; Chronic obstructive pulmonary disease ([facility] [address] Coagulation disorder ([facility] Diabetes ([facility] Difficult intubation; Endocarditis; Heart failure ([facility] Hypertension; Malignant hyperthermia due to anesthesia; Nausea & vomiting; Nicotine vapor product user; Non-nicotine vapor product user; Other unknown and unspecified cause of morbidity or mortality; Pseudocholinesterase deficiency; Psychiatric disorder; PUD (peptic ulcer disease); Rheumatic fever; Seizures ([facility] Stroke [facility]); Thromboembolus ([facility] Thyroid disease; or Unspecified sleep apnea. Ms. Jeferson Nj  has a past surgical history that includes hx heent; hx tonsillectomy (1961); hx lap cholecystectomy; hx carpal tunnel release (Left); hx carpal tunnel release (Right); hx orthopaedic (Left); hx orthopaedic (Left); hx hysterectomy; hx knee arthroscopy (Right); hx bunionectomy (Right); and hx rotator cuff repair (Right). Social History/Living Environment:     Patient lives by herself at home. She has family members that live nearby. Prior Level of Function/Work/Activity:  Patient is retired. Dominant Side:         RIGHT    Current Medications:    Current Outpatient Prescriptions:     acetaminophen (TYLENOL) 500 mg tablet, Take 2 Tabs by mouth every six (6) hours. , Disp: 120 Tab, Rfl: 0    aspirin delayed-release 325 mg tablet, Take 1 Tab by mouth every twelve (12) hours every twelve (12) hours. , Disp: 60 Tab, Rfl: 0    celecoxib (CELEBREX) 200 mg capsule, Take 1 Cap by mouth every twelve (12) hours every twelve (12) hours for 90 days. , Disp: 60 Cap, Rfl: 2    cyclobenzaprine (FLEXERIL) 10 mg tablet, Take 0.5 Tabs by mouth three (3) times daily. , Disp: 60 Tab, Rfl: 0    gabapentin (NEURONTIN) 100 mg capsule, Take 1 Cap by mouth two (2) times a day., Disp: 60 Cap, Rfl: 0    HYDROmorphone (DILAUDID) 2 mg tablet, Take 1 Tab by mouth every four (4) hours as needed.  Max Daily Amount: 12 mg., Disp: 90 Tab, Rfl: 0    ondansetron (ZOFRAN ODT) 8 mg disintegrating tablet, Take 1 Tab by mouth every eight (8) hours as needed for Nausea., Disp: 60 Tab, Rfl: 0    senna-docusate (PERICOLACE) 8.6-50 mg per tablet, Take 2 Tabs by mouth daily. , Disp: 120 Tab, Rfl: 0    zolpidem (AMBIEN) 5 mg tablet, Take 1 Tab by mouth nightly as needed for Sleep. Max Daily Amount: 5 mg., Disp: 60 Tab, Rfl: 0    omeprazole (PRILOSEC) 20 mg capsule, Take 20 mg by mouth Daily (before breakfast). Take / use AM day of surgery  per anesthesia protocols. Indications: GASTROESOPHAGEAL REFLUX, Disp: , Rfl:     multivitamin (ONE A DAY) tablet, Take 1 Tab by mouth daily. , Disp: , Rfl:     VITAMIN E, DL,TOCOPHERYL ACET, (VITAMIN E ACETATE) 400 unit cap capsule, Take 400 Units by mouth every other day., Disp: , Rfl:     magnesium oxide (MAG-OX) 400 mg tablet, Take 400 mg by mouth daily. , Disp: , Rfl:     calcium 500 mg tab, Take 1 Tab by mouth every other day., Disp: , Rfl:     b complex vitamins (B COMPLEX 1) tablet, Take 1 Tab by mouth every other day., Disp: , Rfl:     ascorbic acid (VITAMIN C) 500 mg tablet, Take 500 mg by mouth daily. , Disp: , Rfl:    Date Last Reviewed:  1/10/2018   EXAMINATION:   Observation/Orthostatic Postural Assessment:          Ambulates with delayed gait speed and right lateral trunk tilt. Palpation:          Mild tenderness present throughout the knee and along the distal iliotibial band  ROM:     Right knee: 7 to 90 ° active; 2 to 100 ° with painful, stiff end feel  Left knee: 0 to 130 °   SLR: 90 ° B  Hip extension: 5 ° B   01/08/18 update:   0 to 120 ° with tight end feel into flexion R   Strength:     Knee extension: 3+/5 R; 5/5 L  Knee flexion: 4-/5 R; 5/5 L  SLR: 4-/5 R; 5/5 L  Hip abduction: 4-/5 R; 5/5 L   01/08/18 update:   Knee extension: 4/5  Knee flexion: 4+/5   SLR: 4+/5    Special Tests:          Not applicable  Neurol\ogical Screen:        Sensation is diminished to the distal lateral knee. Intact otherwise. Functional Mobility:         Ambulates without assistive device  Balance:          Maintains single leg stance for 5 seconds R; 10 seconds L   Body Structures Involved:  1. Nerves  2. Bones  3. Joints  4. Muscles  5. Ligaments Body Functions Affected:  1. Sensory/Pain  2. Neuromusculoskeletal  3. Movement Related Activities and Participation Affected:  1. General Tasks and Demands  2. Mobility  3. Self Care  4. Domestic Life  5. Interpersonal Interactions and Relationships  6. Community, Social and Civic Life   CLINICAL PRESENTATION:   CLINICAL DECISION MAKING:   Outcome Measure: Tool Used: Lower Extremity Functional Scale (LEFS)  Score:  Initial: 42/80 Most Recent: 60/80 (Date: 01/08/18 )   Interpretation of Score: 20 questions each scored on a 5 point scale with 0 representing \"extreme difficulty or unable to perform\" and 4 representing \"no difficulty\". The lower the score, the greater the functional disability. 80/80 represents no disability. Minimal detectable change is 9 points. Score 80 79-63 62-48 47-32 31-16 15-1 0   Modifier CH CI CJ CK CL CM CN     ? Mobility - Walking and Moving Around:     - CURRENT STATUS: CJ - 20%-39% impaired, limited or restricted    - GOAL STATUS: CI - 1%-19% impaired, limited or restricted    - D/C STATUS:  CJ - 20%-39% impaired, limited or restricted      Medical Necessity:   · Patient is expected to demonstrate progress in strength, range of motion, balance and coordination to increase independence with community mobility and return to prior level of function. Reason for Services/Other Comments:  · Patient has demonstrated an improvement in functional level by independent performance of HEP. TREATMENT:   (In addition to Assessment/Re-Assessment sessions the following treatments were rendered)  THERAPEUTIC EXERCISE: (see below for minutes):  Exercises per grid below to improve mobility, strength, balance and coordination.   Required minimal verbal and manual cues to promote proper body alignment, promote proper body posture and promote proper body mechanics. Progressed resistance, range, repetitions and complexity of movement as indicated. MANUAL THERAPY: (see below for minutes): Joint mobilization and Soft tissue mobilization was utilized and necessary because of the patient's restricted joint motion, painful spasm, loss of articular motion and restricted motion of soft tissue.    MODALITIES: (see below for minutes):      to decrease pain     Date: 12/11/17 12/13/17 (visit 2) 12/15/17 (visit 3) 12/18/17 (visit 4) 12/20/17 (visit 5) 12/27/17 (visit 6) 12/29/17 (visit 7) 01/02/18 (visit 8) 01/04/18 (visit 9) 01/08/18 (visit 10) 01/10/18 (visit 11)   Modalities:  15 min 15 min 15 min 15 min 15 min 15 min 15 min 15 min 15 min 15 min   Ice  With IFC x 15 min 15 min with IFC Ice with IFC x 15 min Ice with IFC x 15 min Ice with IFC x 15 min Ice with IFC x 15 min Ice with IFC x15 min Ice with IFC x 15 min Ice with IFC x 15 min Ice with IFC x 15 min                               Therapeutic Exercise: 15 min 30 min 30 min 30 min 30 min 30 min 30 min 30 min 30 min 30 min 30 min   NuStep 5 min  4 min L4 5 min L4 6 min L4 5 min L4 5 min L4 5 min L4 5 min L4 5 min L4 5 min L4 5 min L4   Heel slides [id number]             SLR 3x10             Sit to stand 3x10 from chair             Hip abduction 3x10 Lateral band walks blue tband 2x8 laps II bars repeat repeat    Lateral band walk blue tband x 2 laps Lateral band walk blue tband x 2 laps Lateral band walk x 1 lap blue repeat   Single leg stance [id number]   Forward step to airex [id number] Step on/off airex 3x1 min forward, 3x1 min lateral up and over Forward step up 6\" 2x10 with 5sec hold, lateral step up and over airex 3x1 min Step to airex [id number] forward, [id number] lateral Step to airex [id number] forward, up and over 20x Step to airex 2x10 with 5sec hold Step to airex 20x with 5sec hold repeat   Stretching  [id number] to hamstring, 2x45 sec to calves at incline board repeat repeat repeat repeat [id number] to hamstring  [id number] to hamstring and ITB [id number] to hamstring, ITB, hip flexor [id number] to hamstring, ITB, hip flexor   Calf raises  2x15 incline board 2x15 incline board repeat  2x15 incline board 2x10 incline board 2x10 incline board 2x15 incline board 2x15 incline board 2x15 incline board   Step ups  6\" lateral step up 10x, with single leg stance 2x10 6\" lateral step up 10x, with single leg stance 2x10, forward 6\" 3x10 3x10 lateral, 2x15 forward 6\" each 3x10 lateral 6\" 3x10 lateral 6\"   6\" 3x10 lateral to single leg stance, 10\" 3x10 fowrward 10\" forward 3x10; lateral up and over 6\" 3x10 10\" forward 3x10; lateral up and over 6\" 3x10 repeat   Hamstring curls  4# 4x5 4# 4x5 4# 2x12  4# 3x15 repeat repeat repeat 4# 3x15 repeat   LAQ   5# 3x15 5# 3x15 5# 3x15 5# 3x15 repeat repeat 6# 3x15 6# 3x15 6# 3x20   Walking march     x1 lap         Walking butt kick     x1 lap         Bridge       2x10 2x10 2x10  2x10; clamshell 3x10 repeat   Lower trunk rotation      20x        Hip extension      Standing blue tband 2x10    Blue tband 3x12 repeat   Split squat       To 2 airex 3x10       Proprioceptive Activities:                                                        Manual Therapy:  15 min 15 min 15 min 15 min 15 min 15 min 15 min 15 min 15 min 15 min   Passive physiologic mobilizations Gr 3 to 4 into flexion and extension performed Repeat, \"stick\" to anterior and lateral thigh, patellar mobilizations repeat repeat \"stick\" to anterior and lateral thigh, PROM into extension and flexion repeat repeat Repeat, MET to SI joint performed repeat Gr 3 to 3++ into extension and flexion repeat                 Therapeutic Activities:                                                                      HEP: see 12/11/17 flow sheet for specifics. Treatment/Session Assessment:  Patient has met all goals related to knee function.  She continues to have right sided low back pain but this has not been able to be modified with any exercise, position, or activity. · Pain/ Symptoms: Initial:   0/10 \"My knee has been feeling good. It will still ache every now and then, but I can do most everything I want. \" Post Session:  No increase following today's treatment session ·   Compliance with Program/Exercises: Will assess as treatment progresses. · Recommendations/Intent for next treatment session: \"Next visit will focus on advancements to more challenging activities\".   Total Treatment Duration:   PT Patient Time In/Time Out  Time In: 1015  Time Out: [address], DPT

## 2018-01-11 PROCEDURE — 3331090001 HH PPS REVENUE CREDIT

## 2018-01-11 PROCEDURE — 3331090002 HH PPS REVENUE DEBIT

## 2018-01-12 ENCOUNTER — APPOINTMENT (OUTPATIENT)
Dept: PHYSICAL THERAPY | Age: 68
End: 2018-01-12
Payer: MEDICARE

## 2018-01-12 PROCEDURE — 3331090001 HH PPS REVENUE CREDIT

## 2018-01-12 PROCEDURE — 3331090002 HH PPS REVENUE DEBIT

## 2018-01-13 PROCEDURE — 3331090001 HH PPS REVENUE CREDIT

## 2018-01-13 PROCEDURE — 3331090002 HH PPS REVENUE DEBIT

## 2018-01-14 PROCEDURE — 3331090001 HH PPS REVENUE CREDIT

## 2018-01-14 PROCEDURE — 3331090002 HH PPS REVENUE DEBIT

## 2018-01-15 PROCEDURE — 3331090002 HH PPS REVENUE DEBIT

## 2018-01-15 PROCEDURE — 3331090001 HH PPS REVENUE CREDIT

## 2018-01-16 PROCEDURE — 3331090001 HH PPS REVENUE CREDIT

## 2018-01-16 PROCEDURE — 3331090003 HH PPS REVENUE ADJ

## 2018-01-16 PROCEDURE — 3331090002 HH PPS REVENUE DEBIT

## 2018-08-02 NOTE — PROGRESS NOTES
Katina Day  : 1950 [address] [place] [address],[address]  [address] [place] [address].  [phone]   [phone]        OUTPATIENT PHYSICAL THERAPY:Daily Note 2017        ICD-10: Treatment Diagnosis: Impingement syndrome of left shoulder (M75.42); Pain in left shoulder (M25.512)  Precautions/Allergies:   Sulfa (sulfonamide antibiotics) and Ultram [tramadol]   Fall Risk Score: 0 (? 5 = High Risk)  MD Orders: evaluate and treat MEDICAL/REFERRING DIAGNOSIS:  Left shoulder pain [M25.512]   DATE OF ONSET: gradual onset over the past few months  REFERRING PHYSICIAN: John Carias MD  RETURN PHYSICIAN APPOINTMENT: 17     INITIAL ASSESSMENT:  Ms. Desi Jaeger presents to therapy with left shoulder pain secondary to subacromial impingement syndrome. She has restricted mobility of the posterior capsule and tightness of the rotator cuff contributing to difficulty with reaching behind her back. She demonstrates weakness through the posterior rotator cuff due to atrophy and neuromuscular inhibition from pain causing difficulty with carrying tasks and reaching overhead. She has specific pain with movements into extension and functional internal rotation. Skilled therapy is required to return to prior level of function. PROBLEM LIST (Impacting functional limitations):  1. Decreased Strength  2. Decreased ADL/Functional Activities  3. Increased Pain  4. Decreased Activity Tolerance  5. Decreased Flexibility/Joint Mobility INTERVENTIONS PLANNED:  1. Cryotherapy  2. Electrical Stimulation  3. Family Education  4. Home Exercise Program (HEP)  5. Manual Therapy  6. Neuromuscular Re-education/Strengthening  7. Range of Motion (ROM)  8. Therapeutic Activites  9. Therapeutic Exercise/Strengthening  10. modalities   TREATMENT PLAN:  Effective Dates: 17 TO 17.   Frequency/Duration: 2 times a week for 6 weeks  GOALS: (Goals have been discussed and agreed upon with Patient awake in bed watching tv  Moving all extremities RLE limited motion  Patient rates pain 2/10 " throbbing" per patient " not really trying to use the PCA trying to get off this medication but then when it starts to throb ill press the button  I might press it tonight because I do want to sleep tonight  I have not slept since I been here  The first night not at all because I was in so much pain and last night only about an hour" Explained to patient importance of using PCA before pain becomes not tolerable  Will pass on message to day shift nurse about discontinuing PCA  RLE elevated, draining bloody drainage  Dressing intact  Laser doppler reading 0, Dr Berg Oar aware  Offered patient PM ADLS with linen change patient preferred to complete in AM  Patient offers no other concerns  Patient turned and repositioned   Call bell in reach patient.)  Short-Term Functional Goals: Time Frame: 2 weeks  1. Patient will be independent with HEP. MET  2. Patient will report pain <3/10 with all daily activity. met  Discharge Goals: Time Frame: 6 weeks  1. Patient will have no pain with all daily activity. NOT MET  2. Patient will improve shoulder ER to 5/5 to restore dynamic stability required for overhead reaching tasks. PROGRESSING  3. Patient will improve functional IR to T7 to normalize posterior shoulder mobility for tasks such as washing her back or fastening a belt. PROGRESSING  Rehabilitation Potential For Stated Goals: Excellent                HISTORY:   History of Present Injury/Illness (Reason for Referral):  Patient presents to therapy with primary complaint of left shoulder pain that she describes as a painful stiffness with occasional throbbing and dull pain. She notes no specific mechanism of injury but reports symptoms have gradually worsened over the past few months. She has increased pain with reaching behind her back or reaching into extension. She also has difficulty with falling asleep as she can't get comfortable when laying in bed. She has recently undergone cortisone injection with good effect but notes the shoulder continues to feel stiff. Past Medical History/Comorbidities:   Ms. Nicole Ferguson  has a past medical history of Arthritis; Chronic pain; GERD (gastroesophageal reflux disease); Insomnia; and Liver disease. She also has no past medical history of Adverse effect of anesthesia; Aneurysm ([address] Arrhythmia; Asthma; Autoimmune disease ([address] CAD (coronary artery disease); Cancer ([address] Chronic kidney disease; Chronic obstructive pulmonary disease ([address] Coagulation disorder ([address] Diabetes ([address] Difficult intubation; Heart failure ([address] Hypertension; Malignant hyperthermia due to anesthesia; Nausea & vomiting;  Other unknown and unspecified cause of morbidity or mortality; Pseudocholinesterase deficiency; Psychiatric disorder; PUD (peptic ulcer disease); Seizures ([address] Stroke [facility]); Thromboembolus ([address] Thyroid disease; or Unspecified sleep apnea. Ms. Jeferson Quevedo  has a past surgical history that includes heent; tonsillectomy (1961); lap cholecystectomy; carpal tunnel release (Left); carpal tunnel release (Right); orthopaedic (Left); orthopaedic (Left); hysterectomy; knee arthroscopy (Right); and bunionectomy (Right). Social History/Living Environment:     Patient lives at home with her family. Prior Level of Function/Work/Activity:  Patient is retired. Dominant Side:         RIGHT    Current Medications:    Current Outpatient Prescriptions:     amitriptyline (ELAVIL) 25 mg tablet, Take 25 mg by mouth nightly., Disp: , Rfl:     diclofenac EC (VOLTAREN) 75 mg EC tablet, Take 75 mg by mouth two (2) times a day., Disp: , Rfl:     omeprazole (PRILOSEC) 20 mg capsule, Take 20 mg by mouth Daily (before breakfast). Take / use AM day of surgery  per anesthesia protocols. Indications: GASTROESOPHAGEAL REFLUX, Disp: , Rfl:     HYDROcodone-acetaminophen (NORCO) 5-325 mg per tablet, Take 2 Tabs by mouth nightly. Indications: PAIN, Disp: , Rfl:     multivitamin (ONE A DAY) tablet, Take 1 Tab by mouth daily. , Disp: , Rfl:     VITAMIN E, DL,TOCOPHERYL ACET, (VITAMIN E ACETATE) 400 unit cap capsule, Take 400 Units by mouth every other day., Disp: , Rfl:     magnesium oxide (MAG-OX) 400 mg tablet, Take 400 mg by mouth daily. , Disp: , Rfl:     calcium 500 mg tab, Take 1 Tab by mouth every other day., Disp: , Rfl:     b complex vitamins (B COMPLEX 1) tablet, Take 1 Tab by mouth every other day., Disp: , Rfl:     ascorbic acid (VITAMIN C) 500 mg tablet, Take 500 mg by mouth daily. , Disp: , Rfl:    Date Last Reviewed:  9/11/2017   EXAMINATION:   Observation/Orthostatic Postural Assessment:          Mild increase in thoracic kyphosis with abducted scapular position. No gross deformity or atrophy noted.   Palpation:          Tenderness present at subacromial space and posterior glenohumeral joint. ROM:     Flexion: 140 ° active; 150 ° passive with pain on overpressure L; 170 ° R  ER: 70 ° L with pain at end range; 90 ° R  Hand behind head: full and symmetric  IR: 40 ° L with pain; 80 ° R  Functional IR: finger to L3 L; T7 R   09/05/17 update:   Flexion: 160 ° L  ER: 90 ° in scaption and pain free  IR: 70 ° in scaption with pain at end range   Strength:     Scaption: 4/5 L with pain; 5/5 R  ER: 4/5 L with pain; 5/5 R  IR: 4+/5 L; 5/5 R  Elbow flexion: 5/5 B  Elbow extension: 5/5 B   09/05/17 update:   Scaption: 4+/5 L with pain  ER: 4+/5 and pain free  IR: 4+/5 L   Special Tests:          Neer (+); Montgomery (+); Drop arm (-); Lift off (-). Neurological Screen:        Grossly intact  Functional Mobility:         Independent  Balance: WNL   Body Structures Involved:  1. Nerves  2. Bones  3. Joints  4. Muscles  5. Ligaments Body Functions Affected:  1. Sensory/Pain  2. Neuromusculoskeletal  3. Movement Related Activities and Participation Affected:  1. General Tasks and Demands  2. Mobility  3. Self Care  4. Domestic Life  5. Interpersonal Interactions and Relationships   CLINICAL PRESENTATION:   CLINICAL DECISION MAKING:   Outcome Measure: Tool Used: SPADI  Score:  Initial: 20/130 Most Recent: 15/130 (Date: 09/05/17 )   Interpretation of Score: 15% impaired with activities that involve use of the left arm  Outcome Measure Conversion Site    ? Carrying, Moving, and Handling Objects:     - CURRENT STATUS: CI - 1%-19% impaired, limited or restricted    - GOAL STATUS: CH - 0% impaired, limited or restricted    - D/C STATUS:  ---------------To be determined---------------         Medical Necessity:   · Patient is expected to demonstrate progress in strength, range of motion, balance and coordination to decrease assistance required with activities that involve use of the left arm and to return to prior level of function.   Reason for Services/Other Comments:  · Patient has demonstrated an improvement in functional level by independent performance of HEP. TREATMENT:   (In addition to Assessment/Re-Assessment sessions the following treatments were rendered)  THERAPEUTIC EXERCISE: (see below for minutes):  Exercises per grid below to improve mobility, strength, balance and coordination. Required minimal verbal and manual cues to promote proper body alignment, promote proper body posture and promote proper body mechanics. Progressed resistance, range, repetitions and complexity of movement as indicated. MANUAL THERAPY: (see below for minutes): Joint mobilization and Soft tissue mobilization was utilized and necessary because of the patient's restricted joint motion, painful spasm, loss of articular motion and restricted motion of soft tissue.    MODALITIES: (see below for minutes):      to decrease pain     Date: 08/21/17 (visit 6) 08/23/17 (visit 7) 08/28/17 (Visit 8) 8-30-17  (Visit 9) 09/05/17 (visit 10) progress note 09/07/17 (visit 11) 09/11/17 (visit 12)    Modalities: 10 min 10 min          10 min 10 min                               Therapeutic Exercise: 25 min 30 min 30 min 30 mins  30 min 30 min 30 min    Towel IR stretch           Horizontal adduction stretch           Supine ER stretch           Seated W           Band pull aparts Red tband 2x15; D2 band pull aparts 2x15 Extension blue tband B 2x15         UBE 5 min L5 5 min L5 5 min L5 Repeat  5 min L5 5 min L5 5 min L5    D2 repeat Against manual resistance 30x Against manual resistance 30x Repeat  Against manual resistance 2x15 repeat Against manual resistance 2x15    ER  Yellow 2x15 Repeat; sidelying horizontal abduction 1# 2x12 Repeat  Yellow 2x15; 2# sidelying 2x15 2# sidelying 2x15 2# sidelying 2x15    Abduction 2# 2x15 2# 2x15 2# 2x15 sidelying Repeat  2# 2x15 sidelying 2# sidelying 2x15; 2# 2x12 sidelying horizontal abduction repeat    Row 15# 2x15 Green 2x15 Black B 2x15 Repeat  Black B 2x15  Black 2x15    IR  Yellow 2x15 Red 2x15 Red 2x15 Repeat  Red 2x15 Red 2x15 Red 2x15    Extension Red 2x15; standing extension stretch [id number] See above Red 2x15; standing extension B with UE ER 2x15 Repeat  Red 2x15 Red 2x15 Green B with UE ER 2x15    Scaption repeat Wall ball roll [id number]; punch yellow 2x15; scapular press up 3# 2x15 Repeat  2# wall slide with OKC lower 2# wall slide with OKC lower; circles on wall [id number] repeat    Pulleys    Wall wash 2# with OKC lower 2x10 Repeat        Proprioceptive Activities:                                            Manual Therapy: 15 min 15 min 15 min 15 mins  15 min 10 min 10 min    Passive physiologic mobilizations Gr 2 to 3 into flexion, ER and IR repeat repeat Repeat  Gr 3 to 3++ focusing on IR Repeat, progressing to gr 4 repeat               Therapeutic Activities:                                                       HEP: see 08/04/17 flow sheet for specifics. Treatment/Session Assessment: Will follow up with MD later today. Please refer to MD note. · Pain/ Symptoms: Initial:   0/10 \"The sleeping is still going a little better, but I still get some discomfort. \" Post Session:  No increase following today's treatment session. ·   Compliance with Program/Exercises: Will assess as treatment progresses. · Recommendations/Intent for next treatment session: \"Next visit will focus on advancements to more challenging activities\".   Total Treatment Duration:  PT Patient Time In/Time Out  Time In: 0925  Time Out: [address] [facility], [facility]

## 2019-03-08 VITALS — HEIGHT: 66 IN | BODY MASS INDEX: 37.61 KG/M2 | WEIGHT: 234 LBS

## 2019-03-08 RX ORDER — SODIUM CHLORIDE 0.9 % (FLUSH) 0.9 %
5-40 SYRINGE (ML) INJECTION AS NEEDED
Status: CANCELLED | OUTPATIENT
Start: 2019-03-08

## 2019-03-08 RX ORDER — CEFAZOLIN SODIUM/WATER 2 G/20 ML
2 SYRINGE (ML) INTRAVENOUS ONCE
Status: CANCELLED | OUTPATIENT
Start: 2019-03-08 | End: 2019-03-08

## 2019-03-08 RX ORDER — SODIUM CHLORIDE 0.9 % (FLUSH) 0.9 %
5-40 SYRINGE (ML) INJECTION EVERY 8 HOURS
Status: CANCELLED | OUTPATIENT
Start: 2019-03-08

## 2019-03-21 ENCOUNTER — HOSPITAL ENCOUNTER (OUTPATIENT)
Dept: SURGERY | Age: 69
Discharge: HOME | End: 2019-03-21
Payer: MEDICARE

## 2019-03-21 VITALS
SYSTOLIC BLOOD PRESSURE: 145 MMHG | HEIGHT: 66 IN | RESPIRATION RATE: 18 BRPM | WEIGHT: 233 LBS | OXYGEN SATURATION: 97 % | DIASTOLIC BLOOD PRESSURE: 79 MMHG | HEART RATE: 74 BPM | TEMPERATURE: 97.2 F | BODY MASS INDEX: 37.45 KG/M2

## 2019-03-21 LAB
ALBUMIN SERPL-MCNC: 3.6 G/DL (ref 3.2–4.6)
ALBUMIN/GLOB SERPL: 1.1 {RATIO}
ALP SERPL-CCNC: 99 U/L (ref 50–130)
ALT SERPL-CCNC: 37 U/L (ref 12–65)
ANION GAP SERPL CALC-SCNC: 5 MMOL/L
APPEARANCE UR: CLEAR
APTT PPP: 36.7 SEC (ref 24.7–39.8)
AST SERPL-CCNC: 26 U/L (ref 15–37)
BACTERIA SPEC CULT: NORMAL
BACTERIA URNS QL MICRO: 0 /HPF
BILIRUB SERPL-MCNC: 0.4 MG/DL (ref 0.2–1.1)
BILIRUB UR QL: NEGATIVE
BUN SERPL-MCNC: 21 MG/DL (ref 8–23)
CALCIUM SERPL-MCNC: 8.9 MG/DL (ref 8.3–10.4)
CASTS URNS QL MICRO: ABNORMAL /LPF
CHLORIDE SERPL-SCNC: 108 MMOL/L (ref 98–107)
CO2 SERPL-SCNC: 29 MMOL/L (ref 21–32)
COLOR UR: YELLOW
CREAT SERPL-MCNC: 0.68 MG/DL (ref 0.6–1)
EPI CELLS #/AREA URNS HPF: ABNORMAL /HPF
ERYTHROCYTE [DISTWIDTH] IN BLOOD BY AUTOMATED COUNT: 14.5 % (ref 11.9–14.6)
GLOBULIN SER CALC-MCNC: 3.3 G/DL (ref 2.3–3.5)
GLUCOSE SERPL-MCNC: 102 MG/DL (ref 65–100)
GLUCOSE UR STRIP.AUTO-MCNC: NEGATIVE MG/DL
HCT VFR BLD AUTO: 42.4 % (ref 35.8–46.3)
HGB BLD-MCNC: 13.7 G/DL (ref 11.7–15.4)
HGB UR QL STRIP: NEGATIVE
INR PPP: 1
KETONES UR QL STRIP.AUTO: NEGATIVE MG/DL
LEUKOCYTE ESTERASE UR QL STRIP.AUTO: ABNORMAL
MAGNESIUM SERPL-MCNC: 2.5 MG/DL (ref 1.8–2.4)
MCH RBC QN AUTO: 28.2 PG (ref 26.1–32.9)
MCHC RBC AUTO-ENTMCNC: 32.3 G/DL (ref 31.4–35)
MCV RBC AUTO: 87.4 FL (ref 79.6–97.8)
NITRITE UR QL STRIP.AUTO: NEGATIVE
NRBC # BLD: 0 K/UL (ref 0–0.2)
PH UR STRIP: 6.5 [PH] (ref 5–9)
PLATELET # BLD AUTO: 197 K/UL (ref 150–450)
PMV BLD AUTO: 8.8 FL (ref 9.4–12.3)
POTASSIUM SERPL-SCNC: 4.1 MMOL/L (ref 3.5–5.1)
PROT SERPL-MCNC: 6.9 G/DL
PROT UR STRIP-MCNC: NEGATIVE MG/DL
PROTHROMBIN TIME: 13.1 SEC (ref 11.7–14.5)
RBC # BLD AUTO: 4.85 M/UL (ref 4.05–5.2)
RBC #/AREA URNS HPF: ABNORMAL /HPF
SERVICE CMNT-IMP: NORMAL
SODIUM SERPL-SCNC: 142 MMOL/L (ref 136–145)
SP GR UR REFRACTOMETRY: 1.02 (ref 1–1.02)
UROBILINOGEN UR QL STRIP.AUTO: 0.2 EU/DL (ref 0.2–1)
WBC # BLD AUTO: 4.2 K/UL (ref 4.3–11.1)
WBC URNS QL MICRO: ABNORMAL /HPF

## 2019-03-21 PROCEDURE — 81001 URINALYSIS AUTO W/SCOPE: CPT

## 2019-03-21 PROCEDURE — 85027 COMPLETE CBC AUTOMATED: CPT

## 2019-03-21 PROCEDURE — 85610 PROTHROMBIN TIME: CPT

## 2019-03-21 PROCEDURE — 83735 ASSAY OF MAGNESIUM: CPT

## 2019-03-21 PROCEDURE — 87641 MR-STAPH DNA AMP PROBE: CPT

## 2019-03-21 PROCEDURE — 80053 COMPREHEN METABOLIC PANEL: CPT

## 2019-03-21 PROCEDURE — 85730 THROMBOPLASTIN TIME PARTIAL: CPT

## 2019-03-21 RX ORDER — UREA 10 %
1 LOTION (ML) TOPICAL
COMMUNITY

## 2019-03-21 RX ORDER — DICLOFENAC SODIUM 75 MG/1
75 TABLET, DELAYED RELEASE ORAL 2 TIMES DAILY
COMMUNITY

## 2019-03-21 RX ORDER — AMITRIPTYLINE HYDROCHLORIDE 10 MG/1
10 TABLET, FILM COATED ORAL
COMMUNITY

## 2019-03-21 NOTE — PERIOP NOTES
Lab results completed. All results within anesthesia guideline with exception of elevated Mag level 2.5. Will place chart for anesthesia to review. Will send all lab results to surgeon's office including abnormal WBC result. Recent Results (from the past 12 hour(s)) CBC W/O DIFF Collection Time: 03/21/19  9:21 AM  
Result Value Ref Range WBC 4.2 (L) 4.3 - 11.1 K/uL  
 RBC 4.85 4.05 - 5.2 M/uL  
 HGB 13.7 11.7 - 15.4 g/dL HCT 42.4 35.8 - 46.3 % MCV 87.4 79.6 - 97.8 FL  
 MCH 28.2 26.1 - 32.9 PG  
 MCHC 32.3 31.4 - 35.0 g/dL  
 RDW 14.5 11.9 - 14.6 % PLATELET 100 330 - 806 K/uL MPV 8.8 (L) 9.4 - 12.3 FL ABSOLUTE NRBC 0.00 0.0 - 0.2 K/uL MAGNESIUM Collection Time: 03/21/19  9:21 AM  
Result Value Ref Range Magnesium 2.5 (H) 1.8 - 2.4 mg/dL METABOLIC PANEL, COMPREHENSIVE Collection Time: 03/21/19  9:21 AM  
Result Value Ref Range Sodium 142 136 - 145 mmol/L Potassium 4.1 3.5 - 5.1 mmol/L Chloride 108 (H) 98 - 107 mmol/L  
 CO2 29 21 - 32 mmol/L Anion gap 5 mmol/L Glucose 102 (H) 65 - 100 mg/dL BUN 21 8 - 23 MG/DL Creatinine 0.68 0.6 - 1.0 MG/DL  
 GFR est AA >60 >60 ml/min/1.73m2 GFR est non-AA >60 ml/min/1.73m2 Calcium 8.9 8.3 - 10.4 MG/DL Bilirubin, total 0.4 0.2 - 1.1 MG/DL  
 ALT (SGPT) 37 12 - 65 U/L  
 AST (SGOT) 26 15 - 37 U/L Alk. phosphatase 99 50 - 130 U/L Protein, total 6.9 g/dL Albumin 3.6 3.2 - 4.6 g/dL Globulin 3.3 2.3 - 3.5 g/dL A-G Ratio 1.1 PROTHROMBIN TIME + INR Collection Time: 03/21/19  9:21 AM  
Result Value Ref Range Prothrombin time 13.1 11.7 - 14.5 sec INR 1.0    
PTT Collection Time: 03/21/19  9:21 AM  
Result Value Ref Range aPTT 36.7 24.7 - 39.8 SEC URINALYSIS W/ RFLX MICROSCOPIC Collection Time: 03/21/19  9:21 AM  
Result Value Ref Range Color YELLOW Appearance CLEAR Specific gravity 1.022 1.001 - 1.023    
 pH (UA) 6.5 5.0 - 9.0 Protein NEGATIVE  NEG mg/dL Glucose NEGATIVE  mg/dL Ketone NEGATIVE  NEG mg/dL Bilirubin NEGATIVE  NEG Blood NEGATIVE  NEG Urobilinogen 0.2 0.2 - 1.0 EU/dL Nitrites NEGATIVE  NEG Leukocyte Esterase TRACE (A) NEG    
 WBC 0-3 0 /hpf  
 RBC 3-5 0 /hpf Epithelial cells 3-5 0 /hpf Bacteria 0 0 /hpf Casts 0-3 0 /lpf

## 2019-03-21 NOTE — PERIOP NOTES
Patient verified name and . Order for consent was found in EHR and matches case posting; patient verified. Type 3 surgery, PAT walk in assessment complete. Labs per surgeon: CBC, CMP,PT/PTT,Mag,UA, MRSA swab ; results pending Labs per anesthesia protocol: no additional labs needed EKG:Not needed per protocol, last EKG 18 in [facility] for anesthesia reference. Requested EKG tracing from [facility]. MRSA/MSSA swab collected; pharmacy to review and dose antibiotic as appropriate. Hibiclens and instructions to return bottle on DOS given per hospital policy. Patient provided with handouts including Guide to Surgery, Pain Management, Hand Hygiene, Blood Transfusion Education, and [place] [facility] Brochure. Patient answered medical/surgical history questions at their best of ability. All prior to admission medications documented in [facility]. Original medication prescription bottle not visualized during patient appointment. Patient instructed to hold all vitamins 7 days prior to surgery and NSAIDS 5 days prior to surgery. Prescription medications to hold: Voltaren x 5 days prior to surgery and ask patient to either decrease dosage of ASA to 81mg or to stop taking ASA 5 days prior to surgery since she is taking the ASA 325mg nightly for pain control. Patient instructed to continue previous medications as prescribed prior to surgery and to take the following medications the day of surgery according to anesthesia guidelines with a small sip of water: Omeprazole. Patient teach back successful and patient demonstrates knowledge of instruction.

## 2019-03-23 PROBLEM — M19.012 OSTEOARTHRITIS OF LEFT GLENOHUMERAL JOINT: Status: ACTIVE | Noted: 2019-03-23

## 2019-03-23 NOTE — BRIEF OP NOTE
BRIEF OPERATIVE NOTE Date of Procedure: 3/28/2019 Preoperative Diagnosis:  GLENOHUMERAL OSTEOARTHRITIS LEFT SHOULDER Postoperative Diagnosis:  SAME Procedure(s): REVERSE LEFT TOTAL SHOULDER ARTHROPLASTY WITH DELTA EXTEND PROSTHESIS, BICEPS TENODESIS Surgeon(s) and Role: * Aaron Gagnon MD - Primary Assistant Staff:  Domonique Andrews CFA Surgical Staff: 
Circ-1: (Unknown) Scrub Tech-1: (Unknown) Scrub Tech-2: (Unknown) Scrub Tech-3: (Unknown) No case tracking events are documented in the log. Anesthesia:  GENERAL WITH INTERSCALENE BLOCK Estimated Blood Loss: 250 cc. Complications: NONE Implants:  
Implant Name Type Inv. Item Serial No.  Lot No. LRB No. Used Action CEMENT BNE SIMPLEX TOBRA 4 --  - [id number]  CEMENT BNE SIMPLEX TOBRA 4 --  [id number] [employer] HOW [id number] Left 1 Implanted COMPNT [place] Constant [id number]  COMPNT ALINA METAGLENE -- DELTA EXTEND [id number] [address] [place] [address] [id number] Left 1 Implanted COMPNT GLENOSPHERE ECC 42MM -- DELTA EXTEND - [id number]  COMPNT GLENOSPHERE ECC 42MM -- DELTA EXTEND [id number] [facility] [employer] [id number] Left 1 Implanted SCR GLENOID THRD 4.5X42MM -- Fela Analilia - [id number]  SCR GLENOID THRD 4.5X42MM -- DELTA EXTEND [id number] [facility] [employer] [id number] Left 2 Implanted SCR GLENOID THRD 4.5X24MM -- Fela [employer]ilia - [id number]  SCR GLENOID THRD 4.5X24MM -- DELTA EXTEND [id number] [facility] [employer] [id number] Left 1 Implanted RSTRCTR SILVIANO BNE BIOABSRB 10MM -- BIOSTOP G - [id number]  RSTRCTR SILVIANO BNE BIOABSRB 10MM -- BIOSTOP G [id number] [facility] [employer] [id number] Left 1 Implanted STEM [facility] EPI STD SZ2 10M [place] - [id number]  STEM HUM MBLOC EPI STD SZ2 10M -- DELTA XTEND [id number] [facility] [employer] [id number] Left 1 Implanted CUP HUM STD DELT 42MM +6 MM NK -- DELTA Alma Eze - [id number]  CUP HUM STD DELT 42MM +6 MM NK -- DELTA XTEND [id number] [facility] [id number] Left 1 Implanted Fermin Leblanc MD

## 2019-03-23 NOTE — H&P
[facility] HISTORY AND PHYSICAL Subjective:  
 
Patient is a 77 y.o. RHD FEMALE WITH LEFT SHOULDER PAIN. SEE OFFICE NOTE. Patient Active Problem List  
 Diagnosis Date Noted  Osteoarthritis of left glenohumeral joint 03/23/2019  OA (osteoarthritis) of knee 11/15/2017  Superior glenoid labrum lesion 09/03/2015  Supraspinatus (muscle) (tendon) sprain 09/03/2015  Infraspinatus (muscle) (tendon) sprain 09/03/2015  Bicipital tenosynovitis 09/03/2015  Primary localized osteoarthrosis, shoulder region 09/03/2015 Past Medical History:  
Diagnosis Date  Arthritis  BMI 38.0-38.9,adult  Breast cancer ([facility] right  
 no treatment  Chronic pain   
 arthritis  GERD (gastroesophageal reflux disease) daily med  Insomnia   
 takes medication  Leg cramps   
 takes Mag OX bid  Liver disease \" infectious hepatitis as a child for 2 weeks \"  Scarlet fever   
 as a child Past Surgical History:  
Procedure Laterality Date [address] BIOPSY  2018  HX BUNIONECTOMY Right   
 with hammer toe  HX CARPAL TUNNEL RELEASE Left  HX CARPAL TUNNEL RELEASE Right  HX HEENT    
 sinus surgery  HX HYSTERECTOMY    
 with bladder tac and rectocele, \"in my 50's\"  HX KNEE ARTHROSCOPY Right  HX KNEE REPLACEMENT Right 11/2017  HX LAP CHOLECYSTECTOMY  HX ORTHOPAEDIC Left   
 wrist joint replacement  HX ORTHOPAEDIC Left   
 trigger finger  HX ROTATOR CUFF REPAIR Right  [address] Prior to Admission medications Medication Sig Start Date End Date Taking? Authorizing Provider  
diclofenac EC (VOLTAREN) 75 mg EC tablet Take 75 mg by mouth two (2) times a day. Provider, Historical  
amitriptyline (ELAVIL) 10 mg tablet Take 10 mg by mouth nightly. Provider, Historical  
melatonin 1 mg tablet Take 1 mg by mouth nightly.     Provider, Historical  
 acetaminophen (TYLENOL) 500 mg tablet Take 2 Tabs by mouth every six (6) hours. 11/16/17   Georgina Zapata MD  
aspirin delayed-release 325 mg tablet Take 1 Tab by mouth every twelve (12) hours every twelve (12) hours. Patient taking differently: Take 325 mg by mouth nightly. 11/15/17   Georgina Zapata MD  
cyclobenzaprine (FLEXERIL) 10 mg tablet Take 0.5 Tabs by mouth three (3) times daily. 11/15/17   Georgina Zapata MD  
gabapentin (NEURONTIN) 100 mg capsule Take 1 Cap by mouth two (2) times a day. 11/15/17   Georgina Zapata MD  
HYDROmorphone (DILAUDID) 2 mg tablet Take 1 Tab by mouth every four (4) hours as needed. Max Daily Amount: 12 mg. 11/15/17   Georgina Zapata MD  
ondansetron (ZOFRAN ODT) 8 mg disintegrating tablet Take 1 Tab by mouth every eight (8) hours as needed for Nausea. 11/15/17   Georgina Zapata MD  
senna-docusate (PERICOLACE) 8.6-50 mg per tablet Take 2 Tabs by mouth daily. 11/16/17   Georgina Zapata MD  
zolpidem (AMBIEN) 5 mg tablet Take 1 Tab by mouth nightly as needed for Sleep. Max Daily Amount: 5 mg. 11/15/17   Georgina Zapata MD  
omeprazole (PRILOSEC) 20 mg capsule Take 20 mg by mouth Daily (before breakfast). Take / use AM day of surgery  per anesthesia protocols. Indications: GASTROESOPHAGEAL REFLUX    Provider, Historical  
multivitamin (ONE A DAY) tablet Take 1 Tab by mouth daily. Provider, Historical  
VITAMIN E, DL,TOCOPHERYL ACET, (VITAMIN E ACETATE) 400 unit cap capsule Take 400 Units by mouth every other day. Provider, Historical  
magnesium oxide (MAG-OX) 400 mg tablet Take 400 mg by mouth two (2) times a day. Indications: muscle cramps    Provider, Historical  
b complex vitamins (B COMPLEX 1) tablet Take 1 Tab by mouth every other day. Provider, Historical  
ascorbic acid (VITAMIN C) 500 mg tablet Take 500 mg by mouth every other day. Provider, Historical  
 
Allergies Allergen Reactions  Sulfa (Sulfonamide Antibiotics) Rash \"topical only\"  Trazodone Other (comments) Caused restless legs  Ultram [Tramadol] Other (comments) \"Dizzy\" Social History Tobacco Use  Smoking status: Never Smoker  Smokeless tobacco: Never Used Substance Use Topics  Alcohol use: No  
  
Family History Problem Relation Age of Onset  Cancer Mother  Thyroid Disease Mother  Hypertension Mother  Cancer Father Review of Systems A comprehensive review of systems was negative except for that written in the HPI. Objective:  
 
No data found. There were no vitals taken for this visit. General:  Alert, cooperative, no distress, appears stated age. Head:  Normocephalic, without obvious abnormality, atraumatic. Back:   Symmetric, no curvature. ROM normal. No CVA tenderness. Lungs:   Clear to auscultation bilaterally. Chest wall:  No tenderness or deformity. Heart:  Regular rate and rhythm, S1, S2 normal, no murmur, click, rub or gallop. Extremities: Extremities normal, atraumatic, no cyanosis or edema. Pulses: 2+ and symmetric all extremities. Skin: Skin color, texture, turgor normal. No rashes or lesions. Lymph nodes: Cervical, supraclavicular, and axillary nodes normal.  
Neurologic: CNII-XII intact. Normal strength, sensation and reflexes throughout. Assessment:  
 
Principal Problem: 
  Osteoarthritis of left glenohumeral joint (3/23/2019) Plan: The various methods of treatment have been discussed with the patient and family. PATIENT HAS EXHAUSTED NON-OPERATIVE MODALITIES After consideration of risks, benefits and other options for treatment, the patient has consented to surgical intervention. SEE OFFICE NOTE Sherly Dee MD

## 2019-03-25 NOTE — ADVANCED PRACTICE NURSE
Total Joint Surgery Preoperative Chart Review Patient ID: Hua Coffman 
[id number] 
77 y.o. 
1950 Surgeon: Dr. Margherita Mcardle Date of Surgery: 3/28/2019 Procedure: Total Left Shoulder Arthroplasty Primary Care Physician: Soila Apgar, [phone] Specialty Physician(s):   
 
Subjective:  
Hua Coffman is a 77 y.o. WHITE OR  female who presents for preoperative evaluation for Total Left Shoulder arthroplasty. This is a preoperative chart review note based on data collected by the nurse at the surgical Pre-Assessment visit. Past Medical History:  
Diagnosis Date  Arthritis  BMI 38.0-38.9,adult  Breast cancer ([facility] [address] right  
 no treatment  Chronic pain   
 arthritis  GERD (gastroesophageal reflux disease) daily med  Insomnia   
 takes medication  Leg cramps   
 takes Mag OX bid  Liver disease \" infectious hepatitis as a child for 2 weeks \"  Scarlet fever   
 as a child Past Surgical History:  
Procedure Laterality Date [address] BIOPSY  2018  HX BUNIONECTOMY Right   
 with hammer toe  HX CARPAL TUNNEL RELEASE Left  HX CARPAL TUNNEL RELEASE Right  HX HEENT    
 sinus surgery  HX HYSTERECTOMY    
 with bladder tac and rectocele, \"in my 50's\"  HX KNEE ARTHROSCOPY Right  HX KNEE REPLACEMENT Right 2017  HX LAP CHOLECYSTECTOMY  HX ORTHOPAEDIC Left   
 wrist joint replacement  HX ORTHOPAEDIC Left   
 trigger finger  HX ROTATOR CUFF REPAIR Right  [place] Family History Problem Relation Age of Onset  Cancer Mother  Thyroid Disease Mother  Hypertension Mother  Cancer Father Social History Tobacco Use  Smoking status: Never Smoker  Smokeless tobacco: Never Used Substance Use Topics  Alcohol use: No  
   
Prior to Admission medications Medication Sig Start Date End Date Taking? Authorizing Provider diclofenac EC (VOLTAREN) 75 mg EC tablet Take 75 mg by mouth two (2) times a day. Yes Provider, Historical  
amitriptyline (ELAVIL) 10 mg tablet Take 10 mg by mouth nightly. Yes Provider, Historical  
melatonin 1 mg tablet Take 1 mg by mouth nightly. Yes Provider, Historical  
zolpidem (AMBIEN) 5 mg tablet Take 1 Tab by mouth nightly as needed for Sleep. Max Daily Amount: 5 mg. 11/15/17  Yes Missy Pearson MD  
omeprazole (PRILOSEC) 20 mg capsule Take 20 mg by mouth Daily (before breakfast). Take / use AM day of surgery  per anesthesia protocols. Indications: GASTROESOPHAGEAL REFLUX   Yes Provider, Historical  
VITAMIN E, DL,TOCOPHERYL ACET, (VITAMIN E ACETATE) 400 unit cap capsule Take 400 Units by mouth every other day. Yes Provider, Historical  
magnesium oxide (MAG-OX) 400 mg tablet Take 400 mg by mouth two (2) times a day. Indications: muscle cramps   Yes Provider, Historical  
acetaminophen (TYLENOL) 500 mg tablet Take 2 Tabs by mouth every six (6) hours. 11/16/17   Missy Pearson MD  
aspirin delayed-release 325 mg tablet Take 1 Tab by mouth every twelve (12) hours every twelve (12) hours. Patient taking differently: Take 325 mg by mouth nightly. 11/15/17   Missy Pearson MD  
cyclobenzaprine (FLEXERIL) 10 mg tablet Take 0.5 Tabs by mouth three (3) times daily. 11/15/17   Missy Pearson MD  
gabapentin (NEURONTIN) 100 mg capsule Take 1 Cap by mouth two (2) times a day. 11/15/17   Missy Pearson MD  
HYDROmorphone (DILAUDID) 2 mg tablet Take 1 Tab by mouth every four (4) hours as needed. Max Daily Amount: 12 mg. 11/15/17   Missy Pearson MD  
ondansetron (ZOFRAN ODT) 8 mg disintegrating tablet Take 1 Tab by mouth every eight (8) hours as needed for Nausea. 11/15/17   Missy Pearson MD  
senna-docusate (PERICOLACE) 8.6-50 mg per tablet Take 2 Tabs by mouth daily. 11/16/17   Missy Pearson MD  
multivitamin (ONE A DAY) tablet Take 1 Tab by mouth daily.     Provider, Historical  
 b complex vitamins (B COMPLEX 1) tablet Take 1 Tab by mouth every other day. Provider, Historical  
ascorbic acid (VITAMIN C) 500 mg tablet Take 500 mg by mouth every other day. Provider, Historical  
 
Allergies Allergen Reactions  Sulfa (Sulfonamide Antibiotics) Rash \"topical only\"  Trazodone Other (comments) Caused restless legs  Ultram [Tramadol] Other (comments) \"Dizzy\" Objective:  
 
Physical Exam:  
 
 
ECG:   
EKG Results None Data Review:  
Labs:  
Results for Sejal Herbert" (MRN [id number]) as of 3/25/2019 12:13 Ref. Range 3/21/2019 09:21 Sodium Latest Ref Range: 136 - 145 mmol/L 142 Potassium Latest Ref Range: 3.5 - 5.1 mmol/L 4.1 Chloride Latest Ref Range: 98 - 107 mmol/L 108 (H) CO2 Latest Ref Range: 21 - 32 mmol/L 29 Anion gap Latest Units: mmol/L 5 Glucose Latest Ref Range: 65 - 100 mg/dL 102 (H) BUN Latest Ref Range: 8 - 23 MG/DL 21 Creatinine Latest Ref Range: 0.6 - 1.0 MG/DL 0.68 Calcium Latest Ref Range: 8.3 - 10.4 MG/DL 8.9 Magnesium Latest Ref Range: 1.8 - 2.4 mg/dL 2.5 (H) GFR est non-AA Latest Units: ml/min/1.73m2 >60  
GFR est AA Latest Ref Range: >60 ml/min/1.73m2 >60 Results for Sejal Herbert" (MRN [id number]) as of 3/25/2019 12:13 Ref. Range 11/6/2017 09:08 Hemoglobin A1c, (calculated) Latest Ref Range: 4.8 - 6.0 % 5.7 Est. average glucose Latest Units: mg/dL 117 Problem List: 
) Patient Active Problem List  
Diagnosis Code  Superior glenoid labrum lesion K27.191H  Supraspinatus (muscle) (tendon) sprain O70.989C  Infraspinatus (muscle) (tendon) sprain O48.987P  Bicipital tenosynovitis M75.20  Primary localized osteoarthrosis, shoulder region M19.019  
 OA (osteoarthritis) of knee M17.10  
 Osteoarthritis of left glenohumeral joint M19.012  
 BMI 38.0-38.9,adult Z68.38 Total Joint Surgery Pre-Assessment Recommendations: Recommend continuous saturation monitoring during hospitalization. PEP therapy BID. Signed By: Alena Karimi NP-C March 25, 2019

## 2019-03-27 ENCOUNTER — HOSPITAL ENCOUNTER (OUTPATIENT)
Dept: LAB | Age: 69
Discharge: HOME | DRG: 483 | End: 2019-03-27
Payer: MEDICARE

## 2019-03-27 ENCOUNTER — ANESTHESIA EVENT (OUTPATIENT)
Dept: SURGERY | Age: 69
DRG: 483 | End: 2019-03-27
Payer: MEDICARE

## 2019-03-27 PROCEDURE — 36415 COLL VENOUS BLD VENIPUNCTURE: CPT

## 2019-03-27 PROCEDURE — 86923 COMPATIBILITY TEST ELECTRIC: CPT

## 2019-03-27 PROCEDURE — 86900 BLOOD TYPING SEROLOGIC ABO: CPT

## 2019-03-28 ENCOUNTER — ANESTHESIA (OUTPATIENT)
Dept: SURGERY | Age: 69
DRG: 483 | End: 2019-03-28
Payer: MEDICARE

## 2019-03-28 ENCOUNTER — APPOINTMENT (OUTPATIENT)
Dept: GENERAL RADIOLOGY | Age: 69
DRG: 483 | End: 2019-03-28
Attending: ORTHOPAEDIC SURGERY
Payer: MEDICARE

## 2019-03-28 ENCOUNTER — HOSPITAL ENCOUNTER (INPATIENT)
Age: 69
LOS: 3 days | Discharge: HOME HEALTH | DRG: 483 | End: 2019-03-31
Attending: ORTHOPAEDIC SURGERY | Admitting: ORTHOPAEDIC SURGERY
Payer: MEDICARE

## 2019-03-28 PROBLEM — M19.012 PRIMARY OSTEOARTHRITIS OF LEFT SHOULDER: Status: ACTIVE | Noted: 2019-03-28

## 2019-03-28 LAB
EST. AVERAGE GLUCOSE BLD GHB EST-MCNC: 120 MG/DL
HBA1C MFR BLD: 5.8 %

## 2019-03-28 PROCEDURE — 77030008477 HC STYL SATN SLP COVD -A: Performed by: ANESTHESIOLOGY

## 2019-03-28 PROCEDURE — 94762 N-INVAS EAR/PLS OXIMTRY CONT: CPT

## 2019-03-28 PROCEDURE — 77030039425 HC BLD LARYNG TRULITE DISP TELE -A: Performed by: ANESTHESIOLOGY

## 2019-03-28 PROCEDURE — 77030012935 HC DRSG AQUACEL BMS -B: Performed by: ORTHOPAEDIC SURGERY

## 2019-03-28 PROCEDURE — 77030020263 HC SOL INJ SOD CL0.9% LFCR 1000ML

## 2019-03-28 PROCEDURE — 76210000016 HC OR PH I REC 1 TO 1.5 HR: Performed by: ORTHOPAEDIC SURGERY

## 2019-03-28 PROCEDURE — 77030011283 HC ELECTRD NDL COVD -A: Performed by: ORTHOPAEDIC SURGERY

## 2019-03-28 PROCEDURE — 82962 GLUCOSE BLOOD TEST: CPT

## 2019-03-28 PROCEDURE — 74011250636 HC RX REV CODE- 250/636: Performed by: ANESTHESIOLOGY

## 2019-03-28 PROCEDURE — 83036 HEMOGLOBIN GLYCOSYLATED A1C: CPT

## 2019-03-28 PROCEDURE — 77030019908 HC STETH ESOPH SIMS -A: Performed by: ANESTHESIOLOGY

## 2019-03-28 PROCEDURE — 77030008703 HC TU ET UNCUF COVD -A: Performed by: ANESTHESIOLOGY

## 2019-03-28 PROCEDURE — 77030035643 HC BLD SAW OSC PRECIS STRY -C: Performed by: ORTHOPAEDIC SURGERY

## 2019-03-28 PROCEDURE — 77030003827 HC BIT DRL J&J -B: Performed by: ORTHOPAEDIC SURGERY

## 2019-03-28 PROCEDURE — 77030003602 HC NDL NRV BLK BBMI -B: Performed by: ANESTHESIOLOGY

## 2019-03-28 PROCEDURE — 74011000250 HC RX REV CODE- 250

## 2019-03-28 PROCEDURE — 77030013708 HC HNDPC SUC IRR PULS STRY –B: Performed by: ORTHOPAEDIC SURGERY

## 2019-03-28 PROCEDURE — 74011250636 HC RX REV CODE- 250/636: Performed by: ORTHOPAEDIC SURGERY

## 2019-03-28 PROCEDURE — 77030012547: Performed by: ORTHOPAEDIC SURGERY

## 2019-03-28 PROCEDURE — 77030002986 HC SUT PROL J&J -A: Performed by: ORTHOPAEDIC SURGERY

## 2019-03-28 PROCEDURE — 77030018846 HC SOL IRR STRL H20 ICUM -A: Performed by: ORTHOPAEDIC SURGERY

## 2019-03-28 PROCEDURE — 77030032490 HC SLV COMPR SCD KNE COVD -B: Performed by: ORTHOPAEDIC SURGERY

## 2019-03-28 PROCEDURE — 77030031139 HC SUT VCRL2 J&J -A: Performed by: ORTHOPAEDIC SURGERY

## 2019-03-28 PROCEDURE — 73030 X-RAY EXAM OF SHOULDER: CPT

## 2019-03-28 PROCEDURE — 76010010054 HC POST OP PAIN BLOCK: Performed by: ORTHOPAEDIC SURGERY

## 2019-03-28 PROCEDURE — 76942 ECHO GUIDE FOR BIOPSY: CPT | Performed by: ORTHOPAEDIC SURGERY

## 2019-03-28 PROCEDURE — 74011250636 HC RX REV CODE- 250/636

## 2019-03-28 PROCEDURE — 77030018836 HC SOL IRR NACL ICUM -A: Performed by: ORTHOPAEDIC SURGERY

## 2019-03-28 PROCEDURE — 0RRK00Z REPLACEMENT OF LEFT SHOULDER JOINT WITH REVERSE BALL AND SOCKET SYNTHETIC SUBSTITUTE, OPEN APPROACH: ICD-10-PCS | Performed by: ORTHOPAEDIC SURGERY

## 2019-03-28 PROCEDURE — 77030020782 HC GWN BAIR PAWS FLX 3M -B: Performed by: ANESTHESIOLOGY

## 2019-03-28 PROCEDURE — C1776 JOINT DEVICE (IMPLANTABLE): HCPCS | Performed by: ORTHOPAEDIC SURGERY

## 2019-03-28 PROCEDURE — 76010000162 HC OR TIME 1.5 TO 2 HR INTENSV-TIER 1: Performed by: ORTHOPAEDIC SURGERY

## 2019-03-28 PROCEDURE — 65270000029 HC RM PRIVATE

## 2019-03-28 PROCEDURE — 0LS40ZZ REPOSITION LEFT UPPER ARM TENDON, OPEN APPROACH: ICD-10-PCS | Performed by: ORTHOPAEDIC SURGERY

## 2019-03-28 PROCEDURE — 77030002913 HC SUT ETHBND J&J -B: Performed by: ORTHOPAEDIC SURGERY

## 2019-03-28 PROCEDURE — 77030002937 HC SUT MERS J&J -B: Performed by: ORTHOPAEDIC SURGERY

## 2019-03-28 PROCEDURE — 76060000035 HC ANESTHESIA 2 TO 2.5 HR: Performed by: ORTHOPAEDIC SURGERY

## 2019-03-28 PROCEDURE — C1713 ANCHOR/SCREW BN/BN,TIS/BN: HCPCS | Performed by: ORTHOPAEDIC SURGERY

## 2019-03-28 PROCEDURE — 74011000258 HC RX REV CODE- 258: Performed by: ORTHOPAEDIC SURGERY

## 2019-03-28 PROCEDURE — 74011250637 HC RX REV CODE- 250/637: Performed by: ORTHOPAEDIC SURGERY

## 2019-03-28 PROCEDURE — 94760 N-INVAS EAR/PLS OXIMETRY 1: CPT

## 2019-03-28 DEVICE — STEM HUM SZ 2 L137MM DIA10MM 155DEG STD SHLDR CO CHROME HA: Type: IMPLANTABLE DEVICE | Site: SHOULDER | Status: FUNCTIONAL

## 2019-03-28 DEVICE — CEMENT BNE 20ML 41GM FULL DOSE PMMA W/ TOBRA M VISC RADPQ: Type: IMPLANTABLE DEVICE | Site: SHOULDER | Status: FUNCTIONAL

## 2019-03-28 DEVICE — SCREW BNE L24MM DIA4.5MM METAGLENE NONLOCKING FOR PLATFRM: Type: IMPLANTABLE DEVICE | Site: SHOULDER | Status: FUNCTIONAL

## 2019-03-28 DEVICE — SPHERE GLEN DIA42MM SHLDR CO CHROM ECC FOR DELT XTEND REV: Type: IMPLANTABLE DEVICE | Site: SHOULDER | Status: FUNCTIONAL

## 2019-03-28 DEVICE — IMPLANTABLE DEVICE: Type: IMPLANTABLE DEVICE | Site: SHOULDER | Status: FUNCTIONAL

## 2019-03-28 DEVICE — CUP HUM DIA42MM +6MM OFFSET STD SHLDR POLYETH DELT XTEND: Type: IMPLANTABLE DEVICE | Site: SHOULDER | Status: FUNCTIONAL

## 2019-03-28 DEVICE — RESTRICTOR CEM DIA10MM UNIV FEM CNL UHMWPE BIOSTP G: Type: IMPLANTABLE DEVICE | Site: SHOULDER | Status: FUNCTIONAL

## 2019-03-28 DEVICE — SCREW BNE L42MM DIA4.5MM METAGLENE NONLOCKING FOR PLATFRM: Type: IMPLANTABLE DEVICE | Site: SHOULDER | Status: FUNCTIONAL

## 2019-03-28 RX ORDER — PANTOPRAZOLE SODIUM 40 MG/1
40 TABLET, DELAYED RELEASE ORAL
Status: DISCONTINUED | OUTPATIENT
Start: 2019-03-29 | End: 2019-03-31 | Stop reason: HOSPADM

## 2019-03-28 RX ORDER — NALOXONE HYDROCHLORIDE 0.4 MG/ML
0.2 INJECTION, SOLUTION INTRAMUSCULAR; INTRAVENOUS; SUBCUTANEOUS AS NEEDED
Status: DISCONTINUED | OUTPATIENT
Start: 2019-03-28 | End: 2019-03-28 | Stop reason: HOSPADM

## 2019-03-28 RX ORDER — ZOLPIDEM TARTRATE 5 MG/1
5 TABLET ORAL
Status: DISCONTINUED | OUTPATIENT
Start: 2019-03-28 | End: 2019-03-31 | Stop reason: HOSPADM

## 2019-03-28 RX ORDER — HYDROMORPHONE HYDROCHLORIDE 2 MG/ML
0.5 INJECTION, SOLUTION INTRAMUSCULAR; INTRAVENOUS; SUBCUTANEOUS
Status: DISCONTINUED | OUTPATIENT
Start: 2019-03-28 | End: 2019-03-28 | Stop reason: HOSPADM

## 2019-03-28 RX ORDER — SODIUM CHLORIDE 0.9 % (FLUSH) 0.9 %
5-40 SYRINGE (ML) INJECTION EVERY 8 HOURS
Status: DISCONTINUED | OUTPATIENT
Start: 2019-03-28 | End: 2019-03-28 | Stop reason: HOSPADM

## 2019-03-28 RX ORDER — SODIUM CHLORIDE, SODIUM LACTATE, POTASSIUM CHLORIDE, CALCIUM CHLORIDE 600; 310; 30; 20 MG/100ML; MG/100ML; MG/100ML; MG/100ML
75 INJECTION, SOLUTION INTRAVENOUS CONTINUOUS
Status: DISCONTINUED | OUTPATIENT
Start: 2019-03-28 | End: 2019-03-28 | Stop reason: HOSPADM

## 2019-03-28 RX ORDER — SODIUM CHLORIDE 9 MG/ML
75 INJECTION, SOLUTION INTRAVENOUS CONTINUOUS
Status: DISPENSED | OUTPATIENT
Start: 2019-03-28 | End: 2019-03-29

## 2019-03-28 RX ORDER — SODIUM CHLORIDE 0.9 % (FLUSH) 0.9 %
5-40 SYRINGE (ML) INJECTION AS NEEDED
Status: DISCONTINUED | OUTPATIENT
Start: 2019-03-28 | End: 2019-03-28 | Stop reason: HOSPADM

## 2019-03-28 RX ORDER — ROCURONIUM BROMIDE 10 MG/ML
INJECTION, SOLUTION INTRAVENOUS AS NEEDED
Status: DISCONTINUED | OUTPATIENT
Start: 2019-03-28 | End: 2019-03-28 | Stop reason: HOSPADM

## 2019-03-28 RX ORDER — FACIAL-BODY WIPES
10 EACH TOPICAL DAILY PRN
Status: DISCONTINUED | OUTPATIENT
Start: 2019-03-28 | End: 2019-03-31 | Stop reason: HOSPADM

## 2019-03-28 RX ORDER — FENTANYL CITRATE 50 UG/ML
INJECTION, SOLUTION INTRAMUSCULAR; INTRAVENOUS AS NEEDED
Status: DISCONTINUED | OUTPATIENT
Start: 2019-03-28 | End: 2019-03-28 | Stop reason: HOSPADM

## 2019-03-28 RX ORDER — HYDROMORPHONE HYDROCHLORIDE 2 MG/1
2 TABLET ORAL
Status: DISCONTINUED | OUTPATIENT
Start: 2019-03-28 | End: 2019-03-31 | Stop reason: HOSPADM

## 2019-03-28 RX ORDER — LIDOCAINE HYDROCHLORIDE 20 MG/ML
INJECTION, SOLUTION EPIDURAL; INFILTRATION; INTRACAUDAL; PERINEURAL AS NEEDED
Status: DISCONTINUED | OUTPATIENT
Start: 2019-03-28 | End: 2019-03-28 | Stop reason: HOSPADM

## 2019-03-28 RX ORDER — HYDROMORPHONE HYDROCHLORIDE 2 MG/1
4 TABLET ORAL
Status: DISCONTINUED | OUTPATIENT
Start: 2019-03-28 | End: 2019-03-31 | Stop reason: HOSPADM

## 2019-03-28 RX ORDER — BUPIVACAINE HYDROCHLORIDE AND EPINEPHRINE 2.5; 5 MG/ML; UG/ML
INJECTION, SOLUTION EPIDURAL; INFILTRATION; INTRACAUDAL; PERINEURAL
Status: COMPLETED | OUTPATIENT
Start: 2019-03-28 | End: 2019-03-28

## 2019-03-28 RX ORDER — SODIUM CHLORIDE 0.9 % (FLUSH) 0.9 %
5-40 SYRINGE (ML) INJECTION AS NEEDED
Status: DISCONTINUED | OUTPATIENT
Start: 2019-03-28 | End: 2019-03-31 | Stop reason: HOSPADM

## 2019-03-28 RX ORDER — DOCUSATE SODIUM 100 MG/1
100 CAPSULE, LIQUID FILLED ORAL 2 TIMES DAILY
Status: DISCONTINUED | OUTPATIENT
Start: 2019-03-29 | End: 2019-03-31 | Stop reason: HOSPADM

## 2019-03-28 RX ORDER — OXYCODONE AND ACETAMINOPHEN 5; 325 MG/1; MG/1
1 TABLET ORAL AS NEEDED
Status: DISCONTINUED | OUTPATIENT
Start: 2019-03-28 | End: 2019-03-28 | Stop reason: HOSPADM

## 2019-03-28 RX ORDER — GABAPENTIN 100 MG/1
100 CAPSULE ORAL 2 TIMES DAILY
Status: DISCONTINUED | OUTPATIENT
Start: 2019-03-28 | End: 2019-03-28

## 2019-03-28 RX ORDER — SODIUM CHLORIDE 0.9 % (FLUSH) 0.9 %
5-40 SYRINGE (ML) INJECTION EVERY 8 HOURS
Status: DISCONTINUED | OUTPATIENT
Start: 2019-03-28 | End: 2019-03-31 | Stop reason: HOSPADM

## 2019-03-28 RX ORDER — HYDROMORPHONE HYDROCHLORIDE 2 MG/ML
1 INJECTION, SOLUTION INTRAMUSCULAR; INTRAVENOUS; SUBCUTANEOUS
Status: DISCONTINUED | OUTPATIENT
Start: 2019-03-28 | End: 2019-03-31 | Stop reason: HOSPADM

## 2019-03-28 RX ORDER — LIDOCAINE HYDROCHLORIDE 10 MG/ML
0.1 INJECTION INFILTRATION; PERINEURAL AS NEEDED
Status: DISCONTINUED | OUTPATIENT
Start: 2019-03-28 | End: 2019-03-28 | Stop reason: HOSPADM

## 2019-03-28 RX ORDER — BUPIVACAINE HYDROCHLORIDE 5 MG/ML
INJECTION, SOLUTION EPIDURAL; INTRACAUDAL
Status: COMPLETED | OUTPATIENT
Start: 2019-03-28 | End: 2019-03-28

## 2019-03-28 RX ORDER — ONDANSETRON 2 MG/ML
INJECTION INTRAMUSCULAR; INTRAVENOUS AS NEEDED
Status: DISCONTINUED | OUTPATIENT
Start: 2019-03-28 | End: 2019-03-28 | Stop reason: HOSPADM

## 2019-03-28 RX ORDER — LANOLIN ALCOHOL/MO/W.PET/CERES
1 CREAM (GRAM) TOPICAL 2 TIMES DAILY WITH MEALS
Status: DISCONTINUED | OUTPATIENT
Start: 2019-03-29 | End: 2019-03-31 | Stop reason: HOSPADM

## 2019-03-28 RX ORDER — HYDROGEN PEROXIDE 3 %
SOLUTION, NON-ORAL MISCELLANEOUS AS NEEDED
Status: DISCONTINUED | OUTPATIENT
Start: 2019-03-28 | End: 2019-03-28 | Stop reason: HOSPADM

## 2019-03-28 RX ORDER — ASPIRIN 325 MG
325 TABLET, DELAYED RELEASE (ENTERIC COATED) ORAL 2 TIMES DAILY
Status: DISCONTINUED | OUTPATIENT
Start: 2019-03-28 | End: 2019-03-31 | Stop reason: HOSPADM

## 2019-03-28 RX ORDER — AMITRIPTYLINE HYDROCHLORIDE 10 MG/1
10 TABLET, FILM COATED ORAL
Status: DISCONTINUED | OUTPATIENT
Start: 2019-03-28 | End: 2019-03-31 | Stop reason: HOSPADM

## 2019-03-28 RX ORDER — TEMAZEPAM 15 MG/1
15 CAPSULE ORAL
Status: DISCONTINUED | OUTPATIENT
Start: 2019-03-28 | End: 2019-03-31 | Stop reason: HOSPADM

## 2019-03-28 RX ORDER — MIDAZOLAM HYDROCHLORIDE 1 MG/ML
2 INJECTION, SOLUTION INTRAMUSCULAR; INTRAVENOUS
Status: COMPLETED | OUTPATIENT
Start: 2019-03-28 | End: 2019-03-28

## 2019-03-28 RX ORDER — LANOLIN ALCOHOL/MO/W.PET/CERES
400 CREAM (GRAM) TOPICAL 2 TIMES DAILY
Status: DISCONTINUED | OUTPATIENT
Start: 2019-03-28 | End: 2019-03-31 | Stop reason: HOSPADM

## 2019-03-28 RX ORDER — GLYCOPYRROLATE 0.2 MG/ML
INJECTION INTRAMUSCULAR; INTRAVENOUS AS NEEDED
Status: DISCONTINUED | OUTPATIENT
Start: 2019-03-28 | End: 2019-03-28 | Stop reason: HOSPADM

## 2019-03-28 RX ORDER — PROPOFOL 10 MG/ML
INJECTION, EMULSION INTRAVENOUS AS NEEDED
Status: DISCONTINUED | OUTPATIENT
Start: 2019-03-28 | End: 2019-03-28 | Stop reason: HOSPADM

## 2019-03-28 RX ORDER — EPHEDRINE SULFATE 50 MG/ML
INJECTION, SOLUTION INTRAVENOUS AS NEEDED
Status: DISCONTINUED | OUTPATIENT
Start: 2019-03-28 | End: 2019-03-28 | Stop reason: HOSPADM

## 2019-03-28 RX ORDER — PROMETHAZINE HYDROCHLORIDE 25 MG/1
25 TABLET ORAL
Status: DISCONTINUED | OUTPATIENT
Start: 2019-03-28 | End: 2019-03-31 | Stop reason: HOSPADM

## 2019-03-28 RX ORDER — CEFAZOLIN SODIUM/WATER 2 G/20 ML
2 SYRINGE (ML) INTRAVENOUS ONCE
Status: COMPLETED | OUTPATIENT
Start: 2019-03-28 | End: 2019-03-28

## 2019-03-28 RX ORDER — DEXAMETHASONE SODIUM PHOSPHATE 4 MG/ML
INJECTION, SOLUTION INTRA-ARTICULAR; INTRALESIONAL; INTRAMUSCULAR; INTRAVENOUS; SOFT TISSUE AS NEEDED
Status: DISCONTINUED | OUTPATIENT
Start: 2019-03-28 | End: 2019-03-28 | Stop reason: HOSPADM

## 2019-03-28 RX ORDER — NEOSTIGMINE METHYLSULFATE 1 MG/ML
INJECTION INTRAVENOUS AS NEEDED
Status: DISCONTINUED | OUTPATIENT
Start: 2019-03-28 | End: 2019-03-28 | Stop reason: HOSPADM

## 2019-03-28 RX ORDER — CYCLOBENZAPRINE HCL 10 MG
10 TABLET ORAL 3 TIMES DAILY
Status: DISCONTINUED | OUTPATIENT
Start: 2019-03-28 | End: 2019-03-28

## 2019-03-28 RX ADMIN — HYDROMORPHONE HYDROCHLORIDE 0.5 MG: 2 INJECTION, SOLUTION INTRAMUSCULAR; INTRAVENOUS; SUBCUTANEOUS at 10:58

## 2019-03-28 RX ADMIN — HYDROMORPHONE HYDROCHLORIDE 0.5 MG: 2 INJECTION, SOLUTION INTRAMUSCULAR; INTRAVENOUS; SUBCUTANEOUS at 11:03

## 2019-03-28 RX ADMIN — MIDAZOLAM 2 MG: 1 INJECTION INTRAMUSCULAR; INTRAVENOUS at 06:56

## 2019-03-28 RX ADMIN — SODIUM CHLORIDE, SODIUM LACTATE, POTASSIUM CHLORIDE, AND CALCIUM CHLORIDE 75 ML/HR: 600; 310; 30; 20 INJECTION, SOLUTION INTRAVENOUS at 06:17

## 2019-03-28 RX ADMIN — ROCURONIUM BROMIDE 10 MG: 10 INJECTION, SOLUTION INTRAVENOUS at 09:07

## 2019-03-28 RX ADMIN — HYDROMORPHONE HYDROCHLORIDE 2 MG: 2 TABLET ORAL at 17:11

## 2019-03-28 RX ADMIN — Medication 10 ML: at 14:09

## 2019-03-28 RX ADMIN — PROMETHAZINE HYDROCHLORIDE 25 MG: 25 TABLET ORAL at 19:26

## 2019-03-28 RX ADMIN — AMITRIPTYLINE HYDROCHLORIDE 10 MG: 10 TABLET, FILM COATED ORAL at 21:47

## 2019-03-28 RX ADMIN — FENTANYL CITRATE 50 MCG: 50 INJECTION, SOLUTION INTRAMUSCULAR; INTRAVENOUS at 08:24

## 2019-03-28 RX ADMIN — ROCURONIUM BROMIDE 10 MG: 10 INJECTION, SOLUTION INTRAVENOUS at 09:26

## 2019-03-28 RX ADMIN — EPHEDRINE SULFATE 10 MG: 50 INJECTION, SOLUTION INTRAVENOUS at 08:56

## 2019-03-28 RX ADMIN — EPHEDRINE SULFATE 10 MG: 50 INJECTION, SOLUTION INTRAVENOUS at 09:04

## 2019-03-28 RX ADMIN — SODIUM CHLORIDE, SODIUM LACTATE, POTASSIUM CHLORIDE, AND CALCIUM CHLORIDE: 600; 310; 30; 20 INJECTION, SOLUTION INTRAVENOUS at 09:23

## 2019-03-28 RX ADMIN — LIDOCAINE HYDROCHLORIDE 80 MG: 20 INJECTION, SOLUTION EPIDURAL; INFILTRATION; INTRACAUDAL; PERINEURAL at 08:24

## 2019-03-28 RX ADMIN — Medication 2 G: at 08:20

## 2019-03-28 RX ADMIN — HYDROMORPHONE HYDROCHLORIDE 4 MG: 2 TABLET ORAL at 21:48

## 2019-03-28 RX ADMIN — ONDANSETRON 4 MG: 2 INJECTION INTRAMUSCULAR; INTRAVENOUS at 09:33

## 2019-03-28 RX ADMIN — NEOSTIGMINE METHYLSULFATE 3 MG: 1 INJECTION INTRAVENOUS at 10:00

## 2019-03-28 RX ADMIN — HYDROMORPHONE HYDROCHLORIDE 1 MG: 2 INJECTION, SOLUTION INTRAMUSCULAR; INTRAVENOUS; SUBCUTANEOUS at 18:17

## 2019-03-28 RX ADMIN — HYDROMORPHONE HYDROCHLORIDE 0.5 MG: 2 INJECTION, SOLUTION INTRAMUSCULAR; INTRAVENOUS; SUBCUTANEOUS at 11:08

## 2019-03-28 RX ADMIN — BUPIVACAINE HYDROCHLORIDE 10 ML: 5 INJECTION, SOLUTION EPIDURAL; INTRACAUDAL at 08:44

## 2019-03-28 RX ADMIN — EPHEDRINE SULFATE 10 MG: 50 INJECTION, SOLUTION INTRAVENOUS at 09:17

## 2019-03-28 RX ADMIN — DEXAMETHASONE SODIUM PHOSPHATE 8 MG: 4 INJECTION, SOLUTION INTRA-ARTICULAR; INTRALESIONAL; INTRAMUSCULAR; INTRAVENOUS; SOFT TISSUE at 08:36

## 2019-03-28 RX ADMIN — ASPIRIN 325 MG: 325 TABLET, DELAYED RELEASE ORAL at 21:47

## 2019-03-28 RX ADMIN — ROCURONIUM BROMIDE 10 MG: 10 INJECTION, SOLUTION INTRAVENOUS at 08:33

## 2019-03-28 RX ADMIN — EPHEDRINE SULFATE 10 MG: 50 INJECTION, SOLUTION INTRAVENOUS at 08:36

## 2019-03-28 RX ADMIN — HYDROMORPHONE HYDROCHLORIDE 2 MG: 2 TABLET ORAL at 14:07

## 2019-03-28 RX ADMIN — GLYCOPYRROLATE 0.4 MG: 0.2 INJECTION INTRAMUSCULAR; INTRAVENOUS at 10:00

## 2019-03-28 RX ADMIN — BUPIVACAINE HYDROCHLORIDE AND EPINEPHRINE 10 ML: 2.5; 5 INJECTION, SOLUTION EPIDURAL; INFILTRATION; INTRACAUDAL; PERINEURAL at 08:44

## 2019-03-28 RX ADMIN — SODIUM CHLORIDE 75 ML/HR: 900 INJECTION, SOLUTION INTRAVENOUS at 14:09

## 2019-03-28 RX ADMIN — PROPOFOL 200 MG: 10 INJECTION, EMULSION INTRAVENOUS at 08:24

## 2019-03-28 RX ADMIN — Medication 400 MG: at 17:10

## 2019-03-28 RX ADMIN — LIDOCAINE HYDROCHLORIDE 0.1 ML: 10 INJECTION, SOLUTION INFILTRATION; PERINEURAL at 06:28

## 2019-03-28 RX ADMIN — ROCURONIUM BROMIDE 40 MG: 10 INJECTION, SOLUTION INTRAVENOUS at 08:24

## 2019-03-28 RX ADMIN — FENTANYL CITRATE 50 MCG: 50 INJECTION, SOLUTION INTRAMUSCULAR; INTRAVENOUS at 09:34

## 2019-03-28 RX ADMIN — SODIUM CHLORIDE 1000 MG: 900 INJECTION, SOLUTION INTRAVENOUS at 17:10

## 2019-03-28 RX ADMIN — EPHEDRINE SULFATE 10 MG: 50 INJECTION, SOLUTION INTRAVENOUS at 09:01

## 2019-03-28 NOTE — PROGRESS NOTES
Care Management Interventions Mode of Transport at Discharge: Self Transition of Care Consult (CM Consult): Home Health [address]: No 
Reason Outside [place]: Physician referred to specific agency Physical Therapy Consult: Yes Current Support Network: Own Home Plan discussed with Pt/Family/Caregiver: Yes Freedom of Choice Offered: Yes Discharge Location Discharge Placement: Home with home health Patient is a 77 yr old female admitted for a left TSA. She plans to return home on d/c. Order rec'd to arrange home health. Pt w/o preference towards provider. Referral sent to [facility] per MD request.  Will follow until d/c. Abdelrahman Iyer

## 2019-03-28 NOTE — ANESTHESIA POSTPROCEDURE EVALUATION
Procedure(s): REVERSE LEFT TOTAL SHOULDER ARTHROPLASTY WITH DELTA EXTEND PROSTHESIS, BICEPS TENODESIS. general 
 
Anesthesia Post Evaluation Multimodal analgesia: multimodal analgesia used between 6 hours prior to anesthesia start to PACU discharge Patient location during evaluation: PACU Patient participation: complete - patient participated Level of consciousness: awake and alert Pain management: adequate Airway patency: patent Anesthetic complications: no 
Cardiovascular status: acceptable Respiratory status: acceptable Hydration status: acceptable Post anesthesia nausea and vomiting:  none Vitals Value Taken Time /61 3/28/2019 11:16 AM  
Temp Pulse 90 3/28/2019 11:16 AM  
Resp 15 3/28/2019 11:16 AM  
SpO2 95 % 3/28/2019 11:16 AM

## 2019-03-28 NOTE — PERIOP NOTES
TRANSFER - OUT REPORT: 
 
Verbal report given to Lazarus Smart RN(name) on Shara Ar being transferred to [facility](unit) for routine progression of care Report consisted of patient's Situation, Background, Assessment and  
Recommendations(SBAR). Information from the following report(s) OR Summary, Procedure Summary, Intake/Output and MAR was reviewed with the receiving nurse. Opportunity for questions and clarification was provided. Patient transported with: 
 O2 @ 3 liters Tech

## 2019-03-28 NOTE — H&P
Date of Surgery Update: 
Joseph Cochran was seen and examined. History and physical has been reviewed. The patient has been examined.  There have been no significant clinical changes since the completion of the originally dated History and Physical. 
 
Signed By: Jo-Ann Tomlin., MD   
 March 28, 2019 6:33 AM

## 2019-03-28 NOTE — ANESTHESIA PROCEDURE NOTES
Left Interscalene Block Start time: 3/28/2019 6:56 AM 
End time: 3/28/2019 6:59 AM 
Performed by: Chevy Christine MD 
Authorized by: Chevy Chrsitine MD  
 
 
Pre-procedure: Indications: at surgeon's request and post-op pain management Preanesthetic Checklist: patient identified, risks and benefits discussed, site marked, timeout performed, anesthesia consent given and patient being monitored Block Type:  
Block Type: Interscalene Laterality:  Left Monitoring:  Standard ASA monitoring, responsive to questions, oxygen, continuous pulse ox, frequent vital sign checks and heart rate Injection Technique:  Single shot Procedures: ultrasound guided Patient Position: seated Prep: chlorhexidine Location:  Interscalene Needle Type:  Stimuplex Needle Gauge:  22 G Needle Localization:  Ultrasound guidance Assessment: 
Number of attempts:  1 Injection Assessment:  Incremental injection every 5 mL, negative aspiration for CSF, no paresthesia, ultrasound image on chart, no intravascular symptoms, negative aspiration for blood and local visualized surrounding nerve on ultrasound Patient tolerance:  Patient tolerated the procedure well with no immediate complications The relevant block area was scanned by ultrasound before, during, and after the local anesthetic injection and no gross abnomalities were noted.

## 2019-03-28 NOTE — PROGRESS NOTES
03/28/19 1230 Oxygen Therapy O2 Sat (%) 97 % Pulse via Oximetry 73 beats per minute O2 Device Room air O2 Flow Rate (L/min) 0 l/min Incentive Spirometry Treatment Actual Volume (ml) 1250 ml Number of Attempts 3 Patient achieved 1250       Ml/sec on IS. Patient encouraged to do every hour while awake-patient agreed and demonstrated. No shortness of breath or distress noted. BS are clear b/l.   
[facility] notes reviewed- continuous sat # 08 ordered HS

## 2019-03-28 NOTE — PROGRESS NOTES
Pt resting well in bed. Voiding well. Appetite good. Assessment unchanged. inst pt to call jim any needs.

## 2019-03-28 NOTE — PROGRESS NOTES
TRANSFER - IN REPORT: 
 
Verbal report received from Kris Ortezrn(name) on Alee Manzanares  being received from [facility]) for routine progression of care Report consisted of patients Situation, Background, Assessment and  
Recommendations(SBAR). Information from the following report(s) Kardex, Procedure Summary, Intake/Output, MAR and Recent Results was reviewed with the receiving nurse. Opportunity for questions and clarification was provided. Assessment completed upon patients arrival to unit and care assumed.

## 2019-03-28 NOTE — ANESTHESIA PREPROCEDURE EVALUATION
Anesthetic History No history of anesthetic complications Review of Systems / Medical History Patient summary reviewed Pulmonary Within defined limits Neuro/Psych Within defined limits Cardiovascular Exercise tolerance: >4 METS 
  
GI/Hepatic/Renal 
  
GERD: well controlled Endo/Other Obesity and cancer Other Findings Physical Exam 
 
Airway Mallampati: II 
TM Distance: > 6 cm Neck ROM: normal range of motion Mouth opening: Normal 
 
 Cardiovascular Regular rate and rhythm,  S1 and S2 normal,  no murmur, click, rub, or gallop Dental 
No notable dental hx Pulmonary Breath sounds clear to auscultation Abdominal 
 
 
 
 Other Findings Anesthetic Plan ASA: 2 Anesthesia type: general 
 
 
Post-op pain plan if not by surgeon: peripheral nerve block single Anesthetic plan and risks discussed with: Patient Declines SAB

## 2019-03-28 NOTE — DISCHARGE SUMMARY
[facility] Discharge Summary      Patient ID:  Kandy Martin  [id number]  77 y.o.  1950    Allergies: Sulfa (sulfonamide antibiotics); Trazodone; and Ultram [tramadol]    Admit date: 3/28/2019    Discharge date and time: 3/31/2019    Admitting Physician: Ran Skinner MD     Discharge Physician: Ran Skinner MD      * Admission Diagnoses: Primary osteoarthritis of left shoulder [M19.012]  Primary osteoarthritis of left shoulder [M19.012]    * Discharge Diagnoses:   Hospital Problems as of 3/28/2019 Date Reviewed: 9/3/2015          Codes Class Noted - Resolved POA    Primary osteoarthritis of left shoulder ICD-10-CM: M19.012  ICD-9-CM: 715.11  3/28/2019 - Present Unknown        * (Principal) Osteoarthritis of left glenohumeral joint ICD-10-CM: M19.012  ICD-9-CM: 715.91  3/23/2019 - Present Yes              Surgeon: Ran Skinner MD          * Procedure: Procedure(s):  LEFT SHOULDER ARTHROPLASTY TOTAL REVERSE  WITH BICEPS TENODESIS / INTERSCALENE / Berto Vasquez           Perioperative Antibiotics: Ancef  _x__                                                Vancomycin  ___          Post Op complications: none        * Discharge Condition: good  Wound appears to be healing without any evidence of infection.          * Discharged to: Home    * Follow-up Care/Discharge instructions:  - Resume pre hospital diet            - Resume home medications per medical continuation form     CONTINUE PHYSICAL THERAPY  Sling left shoulder  - Follow up in office as scheduled       Signed:  Ran Skinner MD  3/28/2019  6:41 AM

## 2019-03-28 NOTE — PROGRESS NOTES
[address] Face to Face Encounter Patients Name: Asa Savage    YOB: 1950 Ordering Physician: Ainsley Keane Primary Diagnosis: Primary osteoarthritis of left shoulder [M19.012] Primary osteoarthritis of left shoulder [M19.012] Osteoarthritis of left glenohumeral joint [M19.012]  S/p TSA Date of Face to Face:   3/28/2019 Face to Face Encounter findings are related to primary reason for home care:   yes. 1. I certify that the patient needs intermittent care as follows: physical therapy: strengthening and stretching/ROM 2. I certify that this patient is homebound, that is:patient has a normal inability to leave the home and leaving the home requires considerable and taxing effort. Patient may leave the home for infrequent and short duration for medical reasons, and occasional absences for non-medical reasons. Homebound status is due to the following functional limitations: Patient with strength deficits limiting the performance of all ADL's without caregiver assistance or the use of an assistive device. 3. I certify that this patient is under my care and that I, or a nurse practitioner or  [id number], or clinical nurse specialist, or certified nurse midwife, working with me, had a Face-to-Face Encounter that meets the physician Face-to-Face Encounter requirements. The following are the clinical findings from the [address] [place] [address] encounter that support the need for skilled services and is a summary of the encounter: see hospital chart Kartikmargot Nelson, BSW 
3/28/2019 THE FOLLOWING TO BE COMPLETED BY THE COMMUNITY PHYSICIAN: 
 
I concur with the findings described above from the [facility] encounter that this patient is homebound and in need of a skilled service. Certifying Physician: _____________________________________ Printed Certifying Physician Name: _____________________________________ Date: _________________

## 2019-03-29 LAB
ANION GAP SERPL CALC-SCNC: 7 MMOL/L
BUN SERPL-MCNC: 15 MG/DL (ref 8–23)
CALCIUM SERPL-MCNC: 8.7 MG/DL (ref 8.3–10.4)
CHLORIDE SERPL-SCNC: 105 MMOL/L (ref 98–107)
CO2 SERPL-SCNC: 27 MMOL/L (ref 21–32)
CREAT SERPL-MCNC: 0.62 MG/DL (ref 0.6–1)
ERYTHROCYTE [DISTWIDTH] IN BLOOD BY AUTOMATED COUNT: 14.4 % (ref 11.9–14.6)
GLUCOSE SERPL-MCNC: 119 MG/DL (ref 65–100)
HCT VFR BLD AUTO: 34.7 % (ref 35.8–46.3)
HGB BLD-MCNC: 11.3 G/DL (ref 11.7–15.4)
MAGNESIUM SERPL-MCNC: 2.3 MG/DL (ref 1.8–2.4)
MCH RBC QN AUTO: 28.3 PG (ref 26.1–32.9)
MCHC RBC AUTO-ENTMCNC: 32.6 G/DL (ref 31.4–35)
MCV RBC AUTO: 86.8 FL (ref 79.6–97.8)
NRBC # BLD: 0 K/UL (ref 0–0.2)
PLATELET # BLD AUTO: 166 K/UL (ref 150–450)
PMV BLD AUTO: 8.8 FL (ref 9.4–12.3)
POTASSIUM SERPL-SCNC: 4.2 MMOL/L (ref 3.5–5.1)
RBC # BLD AUTO: 4 M/UL (ref 4.05–5.2)
SODIUM SERPL-SCNC: 139 MMOL/L (ref 136–145)
WBC # BLD AUTO: 7.5 K/UL (ref 4.3–11.1)

## 2019-03-29 PROCEDURE — 97116 GAIT TRAINING THERAPY: CPT

## 2019-03-29 PROCEDURE — 74011250636 HC RX REV CODE- 250/636: Performed by: ORTHOPAEDIC SURGERY

## 2019-03-29 PROCEDURE — 85027 COMPLETE CBC AUTOMATED: CPT

## 2019-03-29 PROCEDURE — 74011000258 HC RX REV CODE- 258: Performed by: ORTHOPAEDIC SURGERY

## 2019-03-29 PROCEDURE — 65270000029 HC RM PRIVATE

## 2019-03-29 PROCEDURE — 94762 N-INVAS EAR/PLS OXIMTRY CONT: CPT

## 2019-03-29 PROCEDURE — 83735 ASSAY OF MAGNESIUM: CPT

## 2019-03-29 PROCEDURE — 80048 BASIC METABOLIC PNL TOTAL CA: CPT

## 2019-03-29 PROCEDURE — 77030020263 HC SOL INJ SOD CL0.9% LFCR 1000ML

## 2019-03-29 PROCEDURE — 97161 PT EVAL LOW COMPLEX 20 MIN: CPT

## 2019-03-29 PROCEDURE — 74011250637 HC RX REV CODE- 250/637: Performed by: ORTHOPAEDIC SURGERY

## 2019-03-29 PROCEDURE — 97110 THERAPEUTIC EXERCISES: CPT

## 2019-03-29 PROCEDURE — 94760 N-INVAS EAR/PLS OXIMETRY 1: CPT

## 2019-03-29 PROCEDURE — 36415 COLL VENOUS BLD VENIPUNCTURE: CPT

## 2019-03-29 RX ADMIN — HYDROMORPHONE HYDROCHLORIDE 4 MG: 2 TABLET ORAL at 00:25

## 2019-03-29 RX ADMIN — HYDROMORPHONE HYDROCHLORIDE 4 MG: 2 TABLET ORAL at 13:00

## 2019-03-29 RX ADMIN — HYDROMORPHONE HYDROCHLORIDE 4 MG: 2 TABLET ORAL at 22:26

## 2019-03-29 RX ADMIN — HYDROMORPHONE HYDROCHLORIDE 4 MG: 2 TABLET ORAL at 17:55

## 2019-03-29 RX ADMIN — Medication 400 MG: at 09:15

## 2019-03-29 RX ADMIN — HYDROMORPHONE HYDROCHLORIDE 1 MG: 2 INJECTION, SOLUTION INTRAMUSCULAR; INTRAVENOUS; SUBCUTANEOUS at 15:06

## 2019-03-29 RX ADMIN — AMITRIPTYLINE HYDROCHLORIDE 10 MG: 10 TABLET, FILM COATED ORAL at 22:26

## 2019-03-29 RX ADMIN — HYDROMORPHONE HYDROCHLORIDE 1 MG: 2 INJECTION, SOLUTION INTRAMUSCULAR; INTRAVENOUS; SUBCUTANEOUS at 20:51

## 2019-03-29 RX ADMIN — ASPIRIN 325 MG: 325 TABLET, DELAYED RELEASE ORAL at 09:15

## 2019-03-29 RX ADMIN — HYDROMORPHONE HYDROCHLORIDE 1 MG: 2 INJECTION, SOLUTION INTRAMUSCULAR; INTRAVENOUS; SUBCUTANEOUS at 16:21

## 2019-03-29 RX ADMIN — PANTOPRAZOLE SODIUM 40 MG: 40 TABLET, DELAYED RELEASE ORAL at 06:28

## 2019-03-29 RX ADMIN — DOCUSATE SODIUM 100 MG: 100 CAPSULE, LIQUID FILLED ORAL at 10:21

## 2019-03-29 RX ADMIN — SODIUM CHLORIDE 1000 MG: 900 INJECTION, SOLUTION INTRAVENOUS at 00:21

## 2019-03-29 RX ADMIN — FERROUS SULFATE TAB 325 MG (65 MG ELEMENTAL FE) 325 MG: 325 (65 FE) TAB at 09:15

## 2019-03-29 RX ADMIN — Medication 400 MG: at 17:55

## 2019-03-29 RX ADMIN — DOCUSATE SODIUM 100 MG: 100 CAPSULE, LIQUID FILLED ORAL at 17:55

## 2019-03-29 RX ADMIN — PROMETHAZINE HYDROCHLORIDE 25 MG: 25 TABLET ORAL at 20:56

## 2019-03-29 RX ADMIN — HYDROMORPHONE HYDROCHLORIDE 1 MG: 2 INJECTION, SOLUTION INTRAMUSCULAR; INTRAVENOUS; SUBCUTANEOUS at 19:20

## 2019-03-29 RX ADMIN — ASPIRIN 325 MG: 325 TABLET, DELAYED RELEASE ORAL at 17:55

## 2019-03-29 RX ADMIN — HYDROMORPHONE HYDROCHLORIDE 4 MG: 2 TABLET ORAL at 06:28

## 2019-03-29 NOTE — PROGRESS NOTES
Pt has been up to the bathroom and back into bed now c/o's nausea,  25 mg phenergan given PO. Pt with sling on left upper extremity,  Moving fingers well, warm and with 2+ radial pulses. No noted distress.

## 2019-03-29 NOTE — PROGRESS NOTES
03/29/19 4458 Oxygen Therapy O2 Sat (%) 95 % Pulse via Oximetry 83 beats per minute O2 Device Room air O2 Flow Rate (L/min) 0 l/min FIO2 (%) 21 % Patient achieved   1500   Ml/sec on IS. Patient encouraged to do every hour while awake-patient agreed and demonstrated. No shortness of breath or distress noted. BS are clear b/l.

## 2019-03-29 NOTE — PROGRESS NOTES
Orthopedic Joint Progress Note 2019 Admit Date: 3/28/2019 Admit Diagnosis: Primary osteoarthritis of left shoulder [M19.012] Primary osteoarthritis of left shoulder [M19.012] Osteoarthritis of left glenohumeral joint [M19.012] 1 Day Post-Op Subjective: doing well Ricky Bunting Review of Systems: Pertinent items are noted in HPI. Objective:  
 
PT/OT:  
 
PATIENT MOBILITY Vital Signs:   
Blood pressure 112/56, pulse 71, temperature 97.5 °F (36.4 °C), resp. rate 16, SpO2 95 %, not currently breastfeeding. Temp (24hrs), Av.6 °F (36.4 °C), Min:96.8 °F (36 °C), Max:98 °F (36.7 °C) Pain Control:  
Pain Assessment Pain Scale 1: Numeric (0 - 10) Pain Intensity 1: 3 Pain Onset 1: post op Pain Location 1: Elbow Pain Orientation 1: Left Pain Description 1: Constant Pain Intervention(s) 1: Medication (see MAR) Meds: 
Current Facility-Administered Medications Medication Dose Route Frequency  0.9% sodium chloride infusion  75 mL/hr IntraVENous CONTINUOUS  
 sodium chloride (NS) flush 5-40 mL  5-40 mL IntraVENous Q8H  
 sodium chloride (NS) flush 5-40 mL  5-40 mL IntraVENous PRN  
 bisacodyl (DULCOLAX) suppository 10 mg  10 mg Rectal DAILY PRN  
 sodium phosphate (FLEET'S) enema 1 Enema  1 Enema Rectal PRN  promethazine (PHENERGAN) tablet 25 mg  25 mg Oral Q4H PRN  
 docusate sodium (COLACE) capsule 100 mg  100 mg Oral BID  ferrous sulfate tablet 325 mg  1 Tab Oral BID WITH MEALS  
 temazepam (RESTORIL) capsule 15 mg  15 mg Oral QHS PRN  
 HYDROmorphone (DILAUDID) tablet 4 mg  4 mg Oral Q3H PRN  
 HYDROmorphone (DILAUDID) tablet 2 mg  2 mg Oral Q3H PRN  
 tuberculin injection 5 Units  5 Units IntraDERMal ONCE  
 HYDROmorphone (PF) (DILAUDID) injection 1 mg  1 mg IntraVENous Q1H PRN  
 amitriptyline (ELAVIL) tablet 10 mg  10 mg Oral QHS  aspirin delayed-release tablet 325 mg  325 mg Oral BID  
  pantoprazole (PROTONIX) tablet 40 mg  40 mg Oral ACB  magnesium oxide (MAG-OX) tablet 400 mg  400 mg Oral BID  zolpidem (AMBIEN) tablet 5 mg  5 mg Oral QHS PRN  
  
 
LAB:   
Lab Results Component Value Date/Time INR 1.0 03/21/2019 09:21 AM  
 INR 1.0 11/06/2017 09:08 AM  
 
Lab Results Component Value Date/Time HGB 11.3 (L) 03/29/2019 05:04 AM  
 HGB 13.7 03/21/2019 09:21 AM  
 HGB 10.9 (L) 11/17/2017 04:46 AM  
 
 
Wound Shoulder Right (Active) Number of days: 5837 Wound Knee Right (Active) Number of days: 572 Wound Knee Right (Active) Number of days: 140 Wound Shoulder Left (Active) Dressing Status Intact 3/28/2019 10:16 AM  
Dressing Type Aquacel 3/28/2019 10:16 AM  
Splint Type/Material Shoulder Immobilizer 3/28/2019 10:16 AM  
Number of days: 1 Physical Exam: No significant changes Assessment:  
  
Principal Problem: 
  Osteoarthritis of left glenohumeral joint (3/23/2019) Active Problems: 
  Primary osteoarthritis of left shoulder (3/28/2019) Plan:  
 
Continue PT/OT/Rehab Observe Continue care Home Saturday if stable Patient Expects to be Discharged to[de-identified] Unknown Signed By: Portia Munroe MD

## 2019-03-29 NOTE — OP NOTES
[place]  OPERATIVE REPORT    Name:  Penny Parks  MR#:  [id number]  :  1950  ACCOUNT #:  [de-identified]  DATE OF SERVICE:  2019    PREOPERATIVE DIAGNOSIS:  End-stage glenohumeral osteoarthritis, left shoulder. POSTOPERATIVE DIAGNOSIS:  End-stage glenohumeral osteoarthritis, left shoulder. PROCEDURE PERFORMED:  Reverse left total shoulder arthroplasty with a Delta Xtend prosthesis, biceps tenodesis. SURGEON:  Salvador Moore. Warren Tan MD    ASSISTANT:  Leah Jones, Certified First Assistant. Use of a certified first assistant was necessary to optimize the patient's safety and maximize the outcome. ANESTHESIA:  General with interscalene block. COMPLICATIONS:  None. IMPLANTS:  Hardware utilized is DePuy metaglene 42 eccentric glenosphere, 42 +6 cup, 10.2 stem, 10 mm Biostop G, 42 mm superior and inferior, and 24 mm anterior nonlocking cortical screw. ESTIMATED BLOOD LOSS:  250 mL. PATHOLOGY:  Severe end-stage glenohumeral osteoarthritis, left shoulder. CPT CODE:  44747. ICD-10 CODE:  M19.012    INDICATIONS:  The patient is a 72-year-old female with a complicated history. The patient has severe left shoulder pain. Preoperative physical exam and radiographic studies demonstrate end-stage glenohumeral osteoarthritis of the left shoulder with a marked deformity. The patient also has been recently diagnosed with breast cancer and is yet to seek treatment. She has been counseled to undergo bilateral mastectomies, but she is unclear whether she is going to proceed. It was elected at this point, in light of her recent breast cancer diagnosis, to proceed with a reverse left total shoulder arthroplasty in case of her need for further surgery regarding her breast cancer. PROCEDURE:  Following identification of the patient, the patient was taken to the operative suite.   Following administration of general anesthesia, interscalene block for postop pain control and 2 g of IV Ancef. The patient was then positioned on the operating table in a beach chair fashion. Left shoulder was then prepped and draped in the usual sterile fashion. Standard deltopectoral incision was identified and marked. InteguSeal was applied. Once the InteguSeal was allowed to cure, the skin was incised. Subcutaneous tissue was then dissected down to the deltopectoral interval.  Cephalic vein was identified, dissected proximally and distally, retracted laterally with deltoid. Pec major and strap muscles were retracted medially. Subdeltoid bursa was released. Axillary nerve was protected. Biceps tendon was identified. It was dissected up to the rotator interval.  It was tagged and transected for later tenodesis. Subscapularis was elevated sharply off the lesser tuberosity in a subscapularis peel configuration and tagged. Humerus was then very carefully dislocated from the wound. There were marked degenerative changes on the humeral head and glenoid with large osteophytes. At this point, with the use of oscillating saw, the proximal humeral cut was then performed. Circumferential osteophytes were resected. Glenoid retractors were then inserted. Glenoid was then meticulously exposed. There was a marked DJD on the glenoid as well. Osteophytes were resected. The native glenoid was reconstructed. At this point, the starter point was identified. The starter wire was then drilled. Glenoid was then drilled and reamed. The high side was reamed down. A metaglene was inserted and secured with a 42-mm superior and inferior and 24 mm anterior nonlocking cortical screw. Metaglene was stable. A 42 eccentric glenosphere was inserted and secured in a standard fashion. Metaglene and glenosphere were stable. Humerus was dislocated back from the wound and reamed to accommodate 10.2 stem. A 10.2 proximal reaming was then performed.   It was noted that a 10/2 stem with 42 +6 cup gave excellent stability and mobility. At this point, all trial components were then removed. Humeral canal was irrigated and dried. Antibiotic-impregnated cement was then mixed. It was inserted in the humeral canal and a 10/2 stem with fins had been preloaded with #5 Mersilene sutures was cemented in appropriate version. Excessive cement was removed with a curette. Once the cement was allowed to cure, a true 42 +6 cup was inserted, shoulder was reduced. There was excellent stability with excellent mobility. At this point, the subscapularis was then repaired back to the fin of the prosthesis using #5 Mersilene sutures in a modified [place]oacano-Jani type fashion. Rotator interval was approximated using #5 Mersilene sutures. Biceps tendon was then tenodesed using #5 Mersilene sutures. Arm was put through range of motion and was stable. Axillary nerve was intact. The wound was then irrigated. Deltopectoral interval was approximated with #2 Mersilene sutures. Skin was closed with 0 Vicryl figure-of-eight sutures and a 2-0 Prolene subcuticular stitch. A sterile dressing was applied. A sling and swathe was applied. The patient was then transferred to the recovery room in stable condition. MD LARISSA Brown/[id number]  D:  03/28/2019 10:04  T:  03/28/2019 15:22  JOB #:  [id number]  CC:   Marlen Arechiga MD

## 2019-03-29 NOTE — PROGRESS NOTES
Problem: Mobility Impaired (Adult and Pediatric) Goal: *Acute Goals and Plan of Care (Insert Text) Description GOALS (1-4 days): (1.)  Patient will move from supine to sit and sit to supine  in bed with MODIFIED INDEPENDENCE. (2.)  Patient will transfer from bed to chair and chair to bed with INDEPENDENT using the least restrictive device. (3.)  Patient will ambulate with INDEPENDENT for 500 feet with the least restrictive device. (4.)  Patient will be independent with shoulder HEP to increase range of motion per MD orders. 5) pt will climb 2 steps without rail independently. 
________________________________________________________________________________________________ Outcome: Progressing Towards Goal 
  
PHYSICAL THERAPY: Daily Note, Treatment Day: Day of Assessment, PM 3/29/2019 INPATIENT: Hospital Day: 2 [place] / Product Type: Medicare /  
  
NAME/AGE/GENDER: Leif Abreu is a 77 y.o. female PRIMARY DIAGNOSIS: Primary osteoarthritis of left shoulder [M19.012] Primary osteoarthritis of left shoulder [M19.012] Osteoarthritis of left glenohumeral joint [M19.012] Osteoarthritis of left glenohumeral joint Osteoarthritis of left glenohumeral joint Procedure(s) (LRB): REVERSE LEFT TOTAL SHOULDER ARTHROPLASTY WITH DELTA EXTEND PROSTHESIS, BICEPS TENODESIS (Left) 1 Day Post-Op ICD-10: Treatment Diagnosis: 
 · Pain in Left Shoulder (M25.512) · Stiffness of Left Shoulder, Not elsewhere classified (M25.612) · Generalized Muscle Weakness (M62.81) · Difficulty in walking, Not elsewhere classified (R26.2) Precaution/Allergies: 
Sulfa (sulfonamide antibiotics); Trazodone; and Ultram [tramadol] ASSESSMENT:  
Ms. Genia Astudillo presents with sore & stiff left shoulder along with slightly unsteady gait. This pt will benefit from follow up therapy to help restore safe function & to establish HEP per Rx. In pm session pt did well with all functional activity & exercises but moving slower due to less pain control this pm. 
 
This section established at most recent assessment PROBLEM LIST (Impairments causing functional limitations): 1. Decreased [place] with Bed Mobility 2. Decreased [place] with Transfers 3. Decreased [place] with Ambulation 4. Decreased [place] with shoulder HEP INTERVENTIONS PLANNED: (Benefits and precautions of physical therapy have been discussed with the patient.) 1. Bed Mobility Training 2. Transfer Training 3. Gait Training 4. Therapeutic Exercises per MD orders 5. Modalities for Pain TREATMENT PLAN: Frequency/Duration: twice daily for duration of hospital stay Rehabilitation Potential For Stated Goals: Good RECOMMENDED REHABILITATION/EQUIPMENT: (at time of discharge pending progress): Continue Skilled Therapy and Home Health: Physical Therapy. HISTORY:  
History of Present Injury/Illness (Reason for Referral): Reverse left TSA Past Medical History/Comorbidities: Ms. General Henderson  has a past medical history of Arthritis, BMI 38.0-38.9,adult, Breast cancer ([facility] [address] (right), Chronic pain, GERD (gastroesophageal reflux disease), Insomnia, Leg cramps, Liver disease, and Scarlet fever. She also has no past medical history of Chronic kidney disease. Ms. General Henderson  has a past surgical history that includes hx heent; hx tonsillectomy (1961); hx lap cholecystectomy; hx carpal tunnel release (Left); hx carpal tunnel release (Right); hx orthopaedic (Left); hx orthopaedic (Left); hx hysterectomy; hx knee arthroscopy (Right); hx bunionectomy (Right); hx rotator cuff repair (Right); hx breast biopsy (2018); and hx knee replacement (Right, 11/2017). Social History/Living Environment:  
Home Environment: Private residence # Steps to Enter: 2 Rails to Enter: No 
One/Two Story Residence: One story Living Alone: Yes Support Systems: Family member(s) Patient Expects to be Discharged to[de-identified] Private residence Current DME Used/Available at Home: None Prior Level of Function/Work/Activity: Pt was independent without an assistive device prior tot his admission. Number of Personal Factors/Comorbidities that affect the Plan of Care: 3+: HIGH COMPLEXITY EXAMINATION:  
Most Recent Physical Functioning:  
Gross Assessment: 
AROM: Within functional limits(right UE & both LE's) Strength: Within functional limits(right UE & both LE's) Coordination: Within functional limits(right UE & both LE's) Balance: 
Sitting: Intact; Without support Standing: Impaired; Without support Bed Mobility: 
Supine to Sit: Stand-by assistance Sit to Supine: Stand-by assistance Scooting: Stand-by assistance Transfers: 
Sit to Stand: Stand-by assistance Stand to Sit: Stand-by assistance Bed to Chair: Stand-by assistance Gait: 
  
Speed/Francia: Delayed Gait Abnormalities: Decreased step clearance Distance (ft): 400 Feet (ft) Assistive Device: (none) Ambulation - Level of Assistance: Stand-by assistance Duration: 12 Minutes Functional Mobility:  
      Gait/Ambulation:  sba Transfers:  sba Bed Mobility:  sba Body Structures Involved: 1. Joints 2. Muscles Body Functions Affected: 1. Sensory/Pain 2. Movement Related Activities and Participation Affected: 1. General Tasks and Demands 2. Mobility Number of elements that affect the Plan of Care: 4+: HIGH COMPLEXITY CLINICAL PRESENTATION:  
Presentation: Stable and uncomplicated: LOW COMPLEXITY CLINICAL DECISION MAKING:  
[facility] 6 Clicks Basic Mobility Inpatient Short Form How much difficulty does the patient currently have. .. Unable A Lot A Little None 1. Turning over in bed (including adjusting bedclothes, sheets and blankets)? ? 1   ? 2   ? 3   ? 4  
2.   Sitting down on and standing up from a chair with arms ( e.g., wheelchair, bedside commode, etc.)   ? 1   ? 2   ? 3   ? 4  
3. Moving from lying on back to sitting on the side of the bed?   ? 1   ? 2   ? 3   ? 4 How much help from another person does the patient currently need. .. Total A Lot A Little None 4. Moving to and from a bed to a chair (including a wheelchair)? ? 1   ? 2   ? 3   ? 4  
5. Need to walk in hospital room? ? 1   ? 2   ? 3   ? 4  
6. Climbing 3-5 steps with a railing? ? 1   ? 2   ? 3   ? 4  
© 2007, Trustees of [address] [place] [address] [address] [id number], under license to [employer]. All rights reserved Score:  Initial: 16 Most Recent: X (Date: -- ) Interpretation of Tool:  Represents activities that are increasingly more difficult (i.e. Bed mobility, Transfers, Gait). Medical Necessity:    
· Patient is expected to demonstrate progress in strength, range of motion, balance, coordination and functional technique ·  to decrease assistance required with bed mobility, transfers, gait & HEP · . Reason for Services/Other Comments: 
· Patient continues to require skilled intervention due to pt not independent with functional mobility & HEP · . Use of outcome tool(s) and clinical judgement create a POC that gives a: Clear prediction of patient's progress: LOW COMPLEXITY  
  
 
 
 
TREATMENT:  
(In addition to Assessment/Re-Assessment sessions the following treatments were rendered) Pre-treatment Symptoms/Complaints:  Not as good pain control this pm 
Pain: Initial: visual scale Pain Intensity 1: 5 Pain Location 1: Shoulder Pain Orientation 1: Left Pain Intervention(s) 1: Ambulation/Increased Activity, Cold pack, Exercise  Post Session:  5/10 Therapeutic Exercise: (14 Minutes):  Exercises per grid below to improve mobility and dynamic movement of arm - left to improve functional endurance . Required minimal verbal cues to promote proper body alignment and promote proper body mechanics. Progressed repetitions as indicated. Gait Training (12 Minutes):  Gait training to improve and/or restore physical functioning as related to mobility, strength, balance, coordination and dynamic movement of leg - bilateral to improve functional gait. Ambulated 400 Feet (ft) with Stand-by assistance using a (none) and minimal cues related to their stride length and heel strike to promote proper body alignment, promote proper body posture and promote proper body mechanics. Date: 
3/29 Date: 
 Date: 
  
ACTIVITY/EXERCISE AM PM AM PM AM PM  
Gripping 15 15 Wrist Flexion/Extension 15 15 Wrist Ulnar/Radial Deviation Pronation/Supination 15 15 Elbow Flexion/Extension 15aa 15 Shoulder Flexion/Extension 15aa flex to tolerance 15aa flex to tolerance Shoulder AB/ADduction Shoulder IR/ER Pulleys Pendulums 15aa clock & counter clockwise 15aa clock & counter clockwise Shrugs Isometric:                 Flexion Extension ABduction ADduction Biceps/Triceps B = bilateral; AA = active assistive; A = active; P = passive Education: ? Home Exercises  ? Sling Application   ? Movement Precautions ? Pulleys   ? Use of Ice   ? Other:  
Treatment/Session Assessment:   
· Response to Treatment:  Pt more uncomfortable this pm 
· Interdisciplinary Collaboration:  
o Registered Nurse · After treatment position/precautions:  
o Supine in bed 
o Bed/Chair-wheels locked 
o Bed in low position 
o Call light within reach 
o RN notified · Compliance with Program/Exercises: Will assess as treatment progresses. · Recommendations/Intent for next treatment session:  Treatment next visit will focus on increasing Ms. Tracy Si independence with bed mobility, transfers, gait training, strength/ROM exercises, modalities for pain, and patient education. Total Treatment Duration: PT Patient Time In/Time Out Time In: 0182 Time Out: 1542 Darlene Vargas, PT

## 2019-03-29 NOTE — PROGRESS NOTES
Problem: Mobility Impaired (Adult and Pediatric) Goal: *Acute Goals and Plan of Care (Insert Text) Description GOALS (1-4 days): (1.)  Patient will move from supine to sit and sit to supine  in bed with MODIFIED INDEPENDENCE. (2.)  Patient will transfer from bed to chair and chair to bed with INDEPENDENT using the least restrictive device. (3.)  Patient will ambulate with INDEPENDENT for 500 feet with the least restrictive device. (4.)  Patient will be independent with shoulder HEP to increase range of motion per MD orders. 5) pt will climb 2 steps without rail independently. 
________________________________________________________________________________________________ Outcome: Progressing Towards Goal 
  
PHYSICAL THERAPY: Initial Assessment, Treatment Day: Day of Assessment, AM 3/29/2019 INPATIENT: Hospital Day: 2 [place] / Product Type: Medicare /  
  
NAME/AGE/GENDER: Gabriella Stanford is a 77 y.o. female PRIMARY DIAGNOSIS: Primary osteoarthritis of left shoulder [M19.012] Primary osteoarthritis of left shoulder [M19.012] Osteoarthritis of left glenohumeral joint [M19.012] Osteoarthritis of left glenohumeral joint Osteoarthritis of left glenohumeral joint Procedure(s) (LRB): REVERSE LEFT TOTAL SHOULDER ARTHROPLASTY WITH DELTA EXTEND PROSTHESIS, BICEPS TENODESIS (Left) 1 Day Post-Op ICD-10: Treatment Diagnosis: 
 · Pain in Left Shoulder (M25.512) · Stiffness of Left Shoulder, Not elsewhere classified (M25.612) · Generalized Muscle Weakness (M62.81) · Difficulty in walking, Not elsewhere classified (R26.2) Precaution/Allergies: 
Sulfa (sulfonamide antibiotics); Trazodone; and Ultram [tramadol] ASSESSMENT:  
Ms. Juliann Jacobs presents with sore & stiff left shoulder along with slightly unsteady gait. This pt will benefit from follow up therapy to help restore safe function & to establish HEP per Rx. This section established at most recent assessment PROBLEM LIST (Impairments causing functional limitations): 1. Decreased [place] with Bed Mobility 2. Decreased [place] with Transfers 3. Decreased [place] with Ambulation 4. Decreased [place] with shoulder HEP INTERVENTIONS PLANNED: (Benefits and precautions of physical therapy have been discussed with the patient.) 1. Bed Mobility Training 2. Transfer Training 3. Gait Training 4. Therapeutic Exercises per MD orders 5. Modalities for Pain TREATMENT PLAN: Frequency/Duration: twice daily for duration of hospital stay Rehabilitation Potential For Stated Goals: Good RECOMMENDED REHABILITATION/EQUIPMENT: (at time of discharge pending progress): Continue Skilled Therapy and Home Health: Physical Therapy. HISTORY:  
History of Present Injury/Illness (Reason for Referral): Reverse left TSA Past Medical History/Comorbidities: Ms. Sita Ledezma  has a past medical history of Arthritis, BMI 38.0-38.9,adult, Breast cancer ([facility] [address] (right), Chronic pain, GERD (gastroesophageal reflux disease), Insomnia, Leg cramps, Liver disease, and Scarlet fever. She also has no past medical history of Chronic kidney disease. Ms. Sita Ledezma  has a past surgical history that includes hx heent; hx tonsillectomy (1961); hx lap cholecystectomy; hx carpal tunnel release (Left); hx carpal tunnel release (Right); hx orthopaedic (Left); hx orthopaedic (Left); hx hysterectomy; hx knee arthroscopy (Right); hx bunionectomy (Right); hx rotator cuff repair (Right); hx breast biopsy (2018); and hx knee replacement (Right, 11/2017). Social History/Living Environment:  
Home Environment: Private residence # Steps to Enter: 2 Rails to Enter: No 
One/Two Story Residence: One story Living Alone: Yes Support Systems: Family member(s) Patient Expects to be Discharged to[de-identified] Private residence Current DME Used/Available at Home: None Prior Level of Function/Work/Activity: Pt was independent without an assistive device prior tot his admission. Number of Personal Factors/Comorbidities that affect the Plan of Care: 3+: HIGH COMPLEXITY EXAMINATION:  
Most Recent Physical Functioning:  
Gross Assessment: 
AROM: Within functional limits(right UE & both LE's) Strength: Within functional limits(right UE & both LE's) Coordination: Within functional limits(right UE & both LE's) Posture: 
  
Balance: 
Sitting: Intact; Without support Standing: Impaired; Without support Bed Mobility: 
Supine to Sit: Stand-by assistance Sit to Supine: (NT) Scooting: Stand-by assistance Wheelchair Mobility: 
  
Transfers: 
Sit to Stand: Stand-by assistance Stand to Sit: Stand-by assistance Bed to Chair: Stand-by assistance Gait: 
  
Speed/Francia: Delayed Gait Abnormalities: Decreased step clearance(mild sway) Distance (ft): 400 Feet (ft) Assistive Device: (none) Ambulation - Level of Assistance: Stand-by assistance Functional Mobility:  
      Gait/Ambulation:  sba Transfers:  sba Bed Mobility:  sba Body Structures Involved: 1. Joints 2. Muscles Body Functions Affected: 1. Sensory/Pain 2. Movement Related Activities and Participation Affected: 1. General Tasks and Demands 2. Mobility Number of elements that affect the Plan of Care: 4+: HIGH COMPLEXITY CLINICAL PRESENTATION:  
Presentation: Stable and uncomplicated: LOW COMPLEXITY CLINICAL DECISION MAKIN [facility] [id number] AM-PAC 6 Clicks Basic Mobility Inpatient Short Form How much difficulty does the patient currently have. .. Unable A Lot A Little None 1. Turning over in bed (including adjusting bedclothes, sheets and blankets)? ? 1   ? 2   ? 3   ? 4  
2. Sitting down on and standing up from a chair with arms ( e.g., wheelchair, bedside commode, etc.)   ? 1   ? 2   ? 3   ? 4  
3. Moving from lying on back to sitting on the side of the bed?   ? 1   ? 2   ? 3   ? 4 How much help from another person does the patient currently need. .. Total A Lot A Little None 4. Moving to and from a bed to a chair (including a wheelchair)? ? 1   ? 2   ? 3   ? 4  
5. Need to walk in hospital room? ? 1   ? 2   ? 3   ? 4  
6. Climbing 3-5 steps with a railing? ? 1   ? 2   ? 3   ? 4  
© 2007, Trustees of [address] [place] [address] [address] [address], under license to [employer]. All rights reserved Score:  Initial: 16 Most Recent: X (Date: -- ) Interpretation of Tool:  Represents activities that are increasingly more difficult (i.e. Bed mobility, Transfers, Gait). Medical Necessity:    
· Patient is expected to demonstrate progress in strength, range of motion, balance, coordination and functional technique ·  to decrease assistance required with bed mobility, transfers, gait & HEP · . Reason for Services/Other Comments: 
· Patient continues to require skilled intervention due to pt not independent with functional mobility & HEP · . Use of outcome tool(s) and clinical judgement create a POC that gives a: Clear prediction of patient's progress: LOW COMPLEXITY  
  
 
 
 
TREATMENT:  
(In addition to Assessment/Re-Assessment sessions the following treatments were rendered) Pre-treatment Symptoms/Complaints:  none Pain: Initial: visual scale Pain Intensity 1: 2 Pain Location 1: Shoulder Pain Orientation 1: Left Pain Intervention(s) 1: Cold pack, Exercise  Post Session:  2/10 Therapeutic Exercise: (14 Minutes):  Exercises per grid below to improve mobility and dynamic movement of arm - left to improve functional endurance . Required minimal verbal cues to promote proper body alignment and promote proper body mechanics. Progressed repetitions as indicated. Assessment/ 13 min Date: 
3/29 Date: 
 Date: 
  
ACTIVITY/EXERCISE AM PM AM PM AM PM  
Gripping 15 Wrist Flexion/Extension 15 Wrist Ulnar/Radial Deviation Pronation/Supination 15       
 Elbow Flexion/Extension 15aa Shoulder Flexion/Extension 15aa flex to tolerance Shoulder AB/ADduction Shoulder IR/ER Pulleys Pendulums 15aa clock & counter clockwise Shrugs Isometric:                 Flexion Extension ABduction ADduction Biceps/Triceps B = bilateral; AA = active assistive; A = active; P = passive Education: ? Home Exercises  ? Sling Application   ? Movement Precautions ? Pulleys   ? Use of Ice   ? Other:  
Treatment/Session Assessment:   
· Response to Treatment:  tolerated well · Interdisciplinary Collaboration:  
o Registered Nurse · After treatment position/precautions:  
o Up in chair 
o Bed/Chair-wheels locked 
o Call light within reach 
o RN notified · Compliance with Program/Exercises: Will assess as treatment progresses. · Recommendations/Intent for next treatment session:  Treatment next visit will focus on increasing Ms. Tito Diana independence with bed mobility, transfers, gait training, strength/ROM exercises, modalities for pain, and patient education. Total Treatment Duration: PT Patient Time In/Time Out Time In: 7448 Time Out: 6246 Hola Castro, PT

## 2019-03-30 LAB
ANION GAP SERPL CALC-SCNC: 7 MMOL/L
BUN SERPL-MCNC: 19 MG/DL (ref 8–23)
CALCIUM SERPL-MCNC: 8.6 MG/DL (ref 8.3–10.4)
CHLORIDE SERPL-SCNC: 104 MMOL/L (ref 98–107)
CO2 SERPL-SCNC: 28 MMOL/L (ref 21–32)
CREAT SERPL-MCNC: 0.69 MG/DL (ref 0.6–1)
ERYTHROCYTE [DISTWIDTH] IN BLOOD BY AUTOMATED COUNT: 14.5 % (ref 11.9–14.6)
GLUCOSE SERPL-MCNC: 106 MG/DL (ref 65–100)
HCT VFR BLD AUTO: 34.3 % (ref 35.8–46.3)
HGB BLD-MCNC: 10.8 G/DL (ref 11.7–15.4)
MAGNESIUM SERPL-MCNC: 2.4 MG/DL (ref 1.8–2.4)
MCH RBC QN AUTO: 27.8 PG (ref 26.1–32.9)
MCHC RBC AUTO-ENTMCNC: 31.5 G/DL (ref 31.4–35)
MCV RBC AUTO: 88.4 FL (ref 79.6–97.8)
NRBC # BLD: 0 K/UL (ref 0–0.2)
PLATELET # BLD AUTO: 171 K/UL (ref 150–450)
PMV BLD AUTO: 9.1 FL (ref 9.4–12.3)
POTASSIUM SERPL-SCNC: 4.1 MMOL/L (ref 3.5–5.1)
RBC # BLD AUTO: 3.88 M/UL (ref 4.05–5.2)
SODIUM SERPL-SCNC: 139 MMOL/L (ref 136–145)
WBC # BLD AUTO: 6.3 K/UL (ref 4.3–11.1)

## 2019-03-30 PROCEDURE — 65270000029 HC RM PRIVATE

## 2019-03-30 PROCEDURE — 80048 BASIC METABOLIC PNL TOTAL CA: CPT

## 2019-03-30 PROCEDURE — 83735 ASSAY OF MAGNESIUM: CPT

## 2019-03-30 PROCEDURE — 94760 N-INVAS EAR/PLS OXIMETRY 1: CPT

## 2019-03-30 PROCEDURE — 85027 COMPLETE CBC AUTOMATED: CPT

## 2019-03-30 PROCEDURE — 77030016152 HC PRSSR SYS POS VYRM -B

## 2019-03-30 PROCEDURE — 74011250636 HC RX REV CODE- 250/636: Performed by: ORTHOPAEDIC SURGERY

## 2019-03-30 PROCEDURE — 94664 DEMO&/EVAL PT USE INHALER: CPT

## 2019-03-30 PROCEDURE — 97110 THERAPEUTIC EXERCISES: CPT

## 2019-03-30 PROCEDURE — 97116 GAIT TRAINING THERAPY: CPT

## 2019-03-30 PROCEDURE — 74011250637 HC RX REV CODE- 250/637: Performed by: ORTHOPAEDIC SURGERY

## 2019-03-30 PROCEDURE — 36415 COLL VENOUS BLD VENIPUNCTURE: CPT

## 2019-03-30 RX ADMIN — PANTOPRAZOLE SODIUM 40 MG: 40 TABLET, DELAYED RELEASE ORAL at 06:31

## 2019-03-30 RX ADMIN — Medication 5 ML: at 22:00

## 2019-03-30 RX ADMIN — ASPIRIN 325 MG: 325 TABLET, DELAYED RELEASE ORAL at 08:33

## 2019-03-30 RX ADMIN — HYDROMORPHONE HYDROCHLORIDE 4 MG: 2 TABLET ORAL at 12:40

## 2019-03-30 RX ADMIN — ASPIRIN 325 MG: 325 TABLET, DELAYED RELEASE ORAL at 18:01

## 2019-03-30 RX ADMIN — AMITRIPTYLINE HYDROCHLORIDE 10 MG: 10 TABLET, FILM COATED ORAL at 21:40

## 2019-03-30 RX ADMIN — HYDROMORPHONE HYDROCHLORIDE 4 MG: 2 TABLET ORAL at 18:01

## 2019-03-30 RX ADMIN — Medication 400 MG: at 08:33

## 2019-03-30 RX ADMIN — HYDROMORPHONE HYDROCHLORIDE 1 MG: 2 INJECTION, SOLUTION INTRAMUSCULAR; INTRAVENOUS; SUBCUTANEOUS at 06:37

## 2019-03-30 RX ADMIN — HYDROMORPHONE HYDROCHLORIDE 1 MG: 2 INJECTION, SOLUTION INTRAMUSCULAR; INTRAVENOUS; SUBCUTANEOUS at 10:22

## 2019-03-30 RX ADMIN — Medication 400 MG: at 18:01

## 2019-03-30 RX ADMIN — HYDROMORPHONE HYDROCHLORIDE 4 MG: 2 TABLET ORAL at 04:05

## 2019-03-30 RX ADMIN — DOCUSATE SODIUM 100 MG: 100 CAPSULE, LIQUID FILLED ORAL at 08:33

## 2019-03-30 RX ADMIN — DOCUSATE SODIUM 100 MG: 100 CAPSULE, LIQUID FILLED ORAL at 18:01

## 2019-03-30 RX ADMIN — HYDROMORPHONE HYDROCHLORIDE 1 MG: 2 INJECTION, SOLUTION INTRAMUSCULAR; INTRAVENOUS; SUBCUTANEOUS at 15:07

## 2019-03-30 RX ADMIN — HYDROMORPHONE HYDROCHLORIDE 1 MG: 2 INJECTION, SOLUTION INTRAMUSCULAR; INTRAVENOUS; SUBCUTANEOUS at 21:28

## 2019-03-30 RX ADMIN — HYDROMORPHONE HYDROCHLORIDE 4 MG: 2 TABLET ORAL at 08:33

## 2019-03-30 NOTE — PROGRESS NOTES
Problem: Mobility Impaired (Adult and Pediatric) Goal: *Acute Goals and Plan of Care (Insert Text) Description GOALS (1-4 days): (1.)  Patient will move from supine to sit and sit to supine  in bed with MODIFIED INDEPENDENCE. (2.)  Patient will transfer from bed to chair and chair to bed with INDEPENDENT using the least restrictive device. (3.)  Patient will ambulate with INDEPENDENT for 500 feet with the least restrictive device. (4.)  Patient will be independent with shoulder HEP to increase range of motion per MD orders. Met 3/30 
5) pt will climb 2 steps without rail independently. 
________________________________________________________________________________________________ Outcome: Progressing Towards Goal 
  
PHYSICAL THERAPY: Daily Note, Treatment Day: 1st, AM 3/30/2019 INPATIENT: Hospital Day: 3 [place] / Product Type: Medicare /  
  
NAME/AGE/GENDER: Paulette Bustamante is a 77 y.o. female PRIMARY DIAGNOSIS: Primary osteoarthritis of left shoulder [M19.012] Primary osteoarthritis of left shoulder [M19.012] Osteoarthritis of left glenohumeral joint [M19.012] Osteoarthritis of left glenohumeral joint Osteoarthritis of left glenohumeral joint Procedure(s) (LRB): REVERSE LEFT TOTAL SHOULDER ARTHROPLASTY WITH DELTA EXTEND PROSTHESIS, BICEPS TENODESIS (Left) 2 Days Post-Op ICD-10: Treatment Diagnosis: 
 · Pain in Left Shoulder (M25.512) · Stiffness of Left Shoulder, Not elsewhere classified (M25.612) · Generalized Muscle Weakness (M62.81) · Difficulty in walking, Not elsewhere classified (R26.2) Precaution/Allergies: 
Sulfa (sulfonamide antibiotics); Trazodone; and Ultram [tramadol] ASSESSMENT:  
Ms. Angelia Miranda was more stable with functional mobility & appears to be understanding of HEP & precautions. This section established at most recent assessment PROBLEM LIST (Impairments causing functional limitations): 
 1. Decreased [place] with Bed Mobility 2. Decreased [place] with Transfers 3. Decreased [place] with Ambulation 4. Decreased [place] with shoulder HEP INTERVENTIONS PLANNED: (Benefits and precautions of physical therapy have been discussed with the patient.) 1. Bed Mobility Training 2. Transfer Training 3. Gait Training 4. Therapeutic Exercises per MD orders 5. Modalities for Pain TREATMENT PLAN: Frequency/Duration: twice daily for duration of hospital stay Rehabilitation Potential For Stated Goals: Good RECOMMENDED REHABILITATION/EQUIPMENT: (at time of discharge pending progress): Continue Skilled Therapy and Home Health: Physical Therapy. HISTORY:  
History of Present Injury/Illness (Reason for Referral): Reverse left TSA Past Medical History/Comorbidities: Ms. Genia Astudillo  has a past medical history of Arthritis, BMI 38.0-38.9,adult, Breast cancer ([facility] [address] (right), Chronic pain, GERD (gastroesophageal reflux disease), Insomnia, Leg cramps, Liver disease, and Scarlet fever. She also has no past medical history of Chronic kidney disease. Ms. Genia Astudillo  has a past surgical history that includes hx heent; hx tonsillectomy (1961); hx lap cholecystectomy; hx carpal tunnel release (Left); hx carpal tunnel release (Right); hx orthopaedic (Left); hx orthopaedic (Left); hx hysterectomy; hx knee arthroscopy (Right); hx bunionectomy (Right); hx rotator cuff repair (Right); hx breast biopsy (2018); and hx knee replacement (Right, 11/2017). Social History/Living Environment:  
Home Environment: Private residence # Steps to Enter: 2 Rails to Enter: No 
One/Two Story Residence: One story Living Alone: Yes Support Systems: Family member(s) Patient Expects to be Discharged to[de-identified] Private residence Current DME Used/Available at Home: None Prior Level of Function/Work/Activity: Pt was independent without an assistive device prior tot his admission. Number of Personal Factors/Comorbidities that affect the Plan of Care: 3+: HIGH COMPLEXITY EXAMINATION:  
Most Recent Physical Functioning:  
Gross Assessment: 
AROM: Within functional limits(right UE & both LE's) Strength: Within functional limits(right UE & both LE's) Coordination: Within functional limits(right UE & both LE's) Balance: 
Sitting: Intact; Without support Standing: Intact; Without support Bed Mobility: 
Supine to Sit: Supervision Sit to Supine: (NT) Scooting: Supervision Transfers: 
Sit to Stand: Supervision Stand to Sit: Supervision Bed to Chair: Supervision Gait: 
  
Speed/Francia: Delayed Gait Abnormalities: Decreased step clearance Distance (ft): 400 Feet (ft) Assistive Device: (none) Ambulation - Level of Assistance: Supervision Number of Stairs Trained: 2 Stairs - Level of Assistance: Stand-by assistance Rail Use: None Duration: 16 Minutes Functional Mobility:  
      Gait/Ambulation:  sba Transfers:  sba Bed Mobility:  sba Body Structures Involved: 1. Joints 2. Muscles Body Functions Affected: 1. Sensory/Pain 2. Movement Related Activities and Participation Affected: 1. General Tasks and Demands 2. Mobility Number of elements that affect the Plan of Care: 4+: HIGH COMPLEXITY CLINICAL PRESENTATION:  
Presentation: Stable and uncomplicated: LOW COMPLEXITY CLINICAL DECISION MAKIN [facility] [address] [address] AM-PAC 6 Clicks Basic Mobility Inpatient Short Form How much difficulty does the patient currently have. .. Unable A Lot A Little None 1. Turning over in bed (including adjusting bedclothes, sheets and blankets)? ? 1   ? 2   ? 3   ? 4  
2. Sitting down on and standing up from a chair with arms ( e.g., wheelchair, bedside commode, etc.)   ? 1   ? 2   ? 3   ? 4  
3.   Moving from lying on back to sitting on the side of the bed?   ? 1   ? 2   ? 3   ? 4  
 How much help from another person does the patient currently need. .. Total A Lot A Little None 4. Moving to and from a bed to a chair (including a wheelchair)? ? 1   ? 2   ? 3   ? 4  
5. Need to walk in hospital room? ? 1   ? 2   ? 3   ? 4  
6. Climbing 3-5 steps with a railing? ? 1   ? 2   ? 3   ? 4  
© 2007, Trustees of [facility] [employer], under license to [employer]. All rights reserved Score:  Initial: 16 Most Recent: X (Date: -- ) Interpretation of Tool:  Represents activities that are increasingly more difficult (i.e. Bed mobility, Transfers, Gait). Medical Necessity:    
· Patient is expected to demonstrate progress in strength, range of motion, balance, coordination and functional technique ·  to decrease assistance required with bed mobility, transfers, gait & HEP · . Reason for Services/Other Comments: 
· Patient continues to require skilled intervention due to pt not independent with functional mobility & HEP · . Use of outcome tool(s) and clinical judgement create a POC that gives a: Clear prediction of patient's progress: LOW COMPLEXITY  
  
 
 
 
TREATMENT:  
(In addition to Assessment/Re-Assessment sessions the following treatments were rendered) Pre-treatment Symptoms/Complaints:  Not as good pain control this pm 
Pain: Initial: visual scale Pain Intensity 1: 5 Pain Location 1: Shoulder Pain Orientation 1: Left Pain Intervention(s) 1: Ambulation/Increased Activity, Cold pack, Exercise  Post Session:  5/10 Therapeutic Exercise: (20 Minutes):  Exercises per grid below to improve mobility and dynamic movement of arm - left to improve functional endurance . Required minimal verbal cues to promote proper body alignment and promote proper body mechanics. Progressed repetitions as indicated.  
Gait Training (16 Minutes):  Gait training to improve and/or restore physical functioning as related to mobility, strength, balance, coordination and dynamic movement of leg - bilateral to improve functional gait. Ambulated 400 Feet (ft) with Supervision using a (none) and minimal cues related to their stride length and heel strike to promote proper body alignment, promote proper body posture and promote proper body mechanics. Date: 
3/29 Date: 
3/30 Date: 
  
ACTIVITY/EXERCISE AM PM AM PM AM PM  
Gripping 15 15 25 Wrist Flexion/Extension 15 15 25 Wrist Ulnar/Radial Deviation Pronation/Supination 15 15 25 Elbow Flexion/Extension 15aa 15 25 Shoulder Flexion/Extension 15aa flex to tolerance 15aa flex to tolerance 25aa flex to tolerance Shoulder AB/ADduction Shoulder IR/ER Pulleys Pendulums 15aa clock & counter clockwise 15aa clock & counter clockwise 25aa Clock & counter clockwise Shrugs Isometric:                 Flexion Extension ABduction ADduction Biceps/Triceps B = bilateral; AA = active assistive; A = active; P = passive Education: ? Home Exercises  ? Sling Application   ? Movement Precautions ? Pulleys   ? Use of Ice   ? Other:  
Treatment/Session Assessment:   
· Response to Treatment:  Pt managing better today, Understanding of HEP 
· Interdisciplinary Collaboration:  
o Registered Nurse · After treatment position/precautions:  
o Up in chair 
o Bed/Chair-wheels locked 
o Call light within reach 
o RN notified · Compliance with Program/Exercises: Will assess as treatment progresses. · Recommendations/Intent for next treatment session:  Treatment next visit will focus on increasing Ms. Yanet Saleh independence with bed mobility, transfers, gait training, strength/ROM exercises, modalities for pain, and patient education. Total Treatment Duration: PT Patient Time In/Time Out Time In: 4350 Time Out: 0820 Michael Second, PT

## 2019-03-30 NOTE — PROGRESS NOTES
Care Management Interventions Mode of Transport at Discharge: Self Transition of Care Consult (CM Consult): Home Health [address]: No 
Reason Outside [place]: Physician referred to specific agency([facility]) Physical Therapy Consult: Yes Current Support Network: Own Home Plan discussed with Pt/Family/Caregiver: Yes Freedom of Choice Offered: Yes Discharge Location Discharge Placement: Home with home health

## 2019-03-30 NOTE — PROGRESS NOTES
Problem: Mobility Impaired (Adult and Pediatric) Goal: *Acute Goals and Plan of Care (Insert Text) Description GOALS (1-4 days): (1.)  Patient will move from supine to sit and sit to supine  in bed with MODIFIED INDEPENDENCE. (2.)  Patient will transfer from bed to chair and chair to bed with INDEPENDENT using the least restrictive device. (3.)  Patient will ambulate with INDEPENDENT for 500 feet with the least restrictive device. (4.)  Patient will be independent with shoulder HEP to increase range of motion per MD orders. Met 3/30 
5) pt will climb 2 steps without rail independently. 
________________________________________________________________________________________________ Outcome: Progressing Towards Goal 
  
PHYSICAL THERAPY: Daily Note, Treatment Day: 1st, PM 3/30/2019 INPATIENT: Hospital Day: 3 [place] / Product Type: Medicare /  
  
NAME/AGE/GENDER: Michelle Barba is a 77 y.o. female PRIMARY DIAGNOSIS: Primary osteoarthritis of left shoulder [M19.012] Primary osteoarthritis of left shoulder [M19.012] Osteoarthritis of left glenohumeral joint [M19.012] Osteoarthritis of left glenohumeral joint Osteoarthritis of left glenohumeral joint Procedure(s) (LRB): REVERSE LEFT TOTAL SHOULDER ARTHROPLASTY WITH DELTA EXTEND PROSTHESIS, BICEPS TENODESIS (Left) 2 Days Post-Op ICD-10: Treatment Diagnosis: 
 · Pain in Left Shoulder (M25.512) · Stiffness of Left Shoulder, Not elsewhere classified (M25.612) · Generalized Muscle Weakness (M62.81) · Difficulty in walking, Not elsewhere classified (R26.2) Precaution/Allergies: 
Sulfa (sulfonamide antibiotics); Trazodone; and Ultram [tramadol] ASSESSMENT:  
Ms. Clotilde Rausch was more stable with functional mobility & appears to be understanding of HEP & precautions.  In pm session pt was more lethargic & unsteady with transfers & gait, also pt did not tolerate exercises as well this pm. 
 
 This section established at most recent assessment PROBLEM LIST (Impairments causing functional limitations): 1. Decreased [place] with Bed Mobility 2. Decreased [place] with Transfers 3. Decreased [place] with Ambulation 4. Decreased [place] with shoulder HEP INTERVENTIONS PLANNED: (Benefits and precautions of physical therapy have been discussed with the patient.) 1. Bed Mobility Training 2. Transfer Training 3. Gait Training 4. Therapeutic Exercises per MD orders 5. Modalities for Pain TREATMENT PLAN: Frequency/Duration: twice daily for duration of hospital stay Rehabilitation Potential For Stated Goals: Good RECOMMENDED REHABILITATION/EQUIPMENT: (at time of discharge pending progress): Continue Skilled Therapy and Home Health: Physical Therapy. HISTORY:  
History of Present Injury/Illness (Reason for Referral): Reverse left TSA Past Medical History/Comorbidities: Ms. Clotilde Rausch  has a past medical history of Arthritis, BMI 38.0-38.9,adult, Breast cancer ([facility] [address] (right), Chronic pain, GERD (gastroesophageal reflux disease), Insomnia, Leg cramps, Liver disease, and Scarlet fever. She also has no past medical history of Chronic kidney disease. Ms. Clotilde Rausch  has a past surgical history that includes hx heent; hx tonsillectomy (1961); hx lap cholecystectomy; hx carpal tunnel release (Left); hx carpal tunnel release (Right); hx orthopaedic (Left); hx orthopaedic (Left); hx hysterectomy; hx knee arthroscopy (Right); hx bunionectomy (Right); hx rotator cuff repair (Right); hx breast biopsy (2018); and hx knee replacement (Right, 11/2017). Social History/Living Environment:  
Home Environment: Private residence # Steps to Enter: 2 Rails to Enter: No 
One/Two Story Residence: One story Living Alone: Yes Support Systems: Family member(s) Patient Expects to be Discharged to[de-identified] Private residence Current DME Used/Available at Home: None Prior Level of Function/Work/Activity: Pt was independent without an assistive device prior tot his admission. Number of Personal Factors/Comorbidities that affect the Plan of Care: 3+: HIGH COMPLEXITY EXAMINATION:  
Most Recent Physical Functioning:  
Gross Assessment: 
AROM: Within functional limits(right UE & both LE's) Strength: Within functional limits(right UE & both LE's) Coordination: Within functional limits(right UE & both LE's) Balance: 
Sitting: Intact; Without support Standing: Impaired; With support(walker) Bed Mobility: 
Supine to Sit: Stand-by assistance Sit to Supine: Contact guard assistance Scooting: Contact guard assistance Transfers: 
Sit to Stand: Contact guard assistance Stand to Sit: Contact guard assistance Bed to Chair: Contact guard assistance Gait: 
  
Speed/Francia: Delayed Gait Abnormalities: Decreased step clearance(mild sway) Distance (ft): 400 Feet (ft) Assistive Device: (none) Ambulation - Level of Assistance: Contact guard assistance Number of Stairs Trained: (NT) Stairs - Level of Assistance: (NT) 
Rail Use: (NA) Duration: 11 Minutes Functional Mobility:  
      Gait/Ambulation:  cga Transfers:  cga Bed Mobility:  sba Body Structures Involved: 1. Joints 2. Muscles Body Functions Affected: 1. Sensory/Pain 2. Movement Related Activities and Participation Affected: 1. General Tasks and Demands 2. Mobility Number of elements that affect the Plan of Care: 4+: HIGH COMPLEXITY CLINICAL PRESENTATION:  
Presentation: Stable and uncomplicated: LOW COMPLEXITY CLINICAL DECISION MAKING:  
[facility] [employer] -PAC 6 Clicks Basic Mobility Inpatient Short Form How much difficulty does the patient currently have. .. Unable A Lot A Little None 1. Turning over in bed (including adjusting bedclothes, sheets and blankets)? ? 1   ? 2   ? 3   ? 4  
2.   Sitting down on and standing up from a chair with arms ( e.g., wheelchair, bedside commode, etc.)   ? 1   ? 2   ? 3   ? 4  
3. Moving from lying on back to sitting on the side of the bed?   ? 1   ? 2   ? 3   ? 4 How much help from another person does the patient currently need. .. Total A Lot A Little None 4. Moving to and from a bed to a chair (including a wheelchair)? ? 1   ? 2   ? 3   ? 4  
5. Need to walk in hospital room? ? 1   ? 2   ? 3   ? 4  
6. Climbing 3-5 steps with a railing? ? 1   ? 2   ? 3   ? 4  
© 2007, Trustees of [facility] [employer], under license to [employer]. All rights reserved Score:  Initial: 16 Most Recent: X (Date: -- ) Interpretation of Tool:  Represents activities that are increasingly more difficult (i.e. Bed mobility, Transfers, Gait). Medical Necessity:    
· Patient is expected to demonstrate progress in strength, range of motion, balance, coordination and functional technique ·  to decrease assistance required with bed mobility, transfers, gait & HEP · . Reason for Services/Other Comments: 
· Patient continues to require skilled intervention due to pt not independent with functional mobility & HEP · . Use of outcome tool(s) and clinical judgement create a POC that gives a: Clear prediction of patient's progress: LOW COMPLEXITY  
  
 
 
 
TREATMENT:  
(In addition to Assessment/Re-Assessment sessions the following treatments were rendered) Pre-treatment Symptoms/Complaints:  \" I am so out of it \" 
Pain: Initial: visual scale Pain Intensity 1: 4 Pain Location 1: Shoulder Pain Orientation 1: Left Pain Intervention(s) 1: Ambulation/Increased Activity, Cold pack  Post Session:  5/10 Therapeutic Exercise: (12 Minutes):  Exercises per grid below to improve mobility and dynamic movement of arm - left to improve functional endurance . Required minimal verbal cues to promote proper body alignment and promote proper body mechanics. Progressed repetitions as indicated. Gait Training (11 Minutes):  Gait training to improve and/or restore physical functioning as related to mobility, strength, balance, coordination and dynamic movement of leg - bilateral to improve functional gait. Ambulated 400 Feet (ft) with Contact guard assistance using a (none) and minimal cues related to their stride length and heel strike to promote proper body alignment, promote proper body posture and promote proper body mechanics. Date: 
3/29 Date: 
3/30 Date: 
  
ACTIVITY/EXERCISE AM PM AM PM AM PM  
Gripping 15 15 25 25 Wrist Flexion/Extension 15 15 25 25 Wrist Ulnar/Radial Deviation Pronation/Supination 15 15 25 25 Elbow Flexion/Extension 15aa 15 25 25 Shoulder Flexion/Extension 15aa flex to tolerance 15aa flex to tolerance 25aa flex to tolerance 25aa flex to tolerance Shoulder AB/ADduction Shoulder IR/ER Pulleys Pendulums 15aa clock & counter clockwise 15aa clock & counter clockwise 25aa Clock & counter clockwise 25aa clock & counter clockwise Shrugs Isometric:                 Flexion Extension ABduction ADduction Biceps/Triceps B = bilateral; AA = active assistive; A = active; P = passive Education: ? Home Exercises  ? Sling Application   ? Movement Precautions ? Pulleys   ? Use of Ice   ? Other:  
Treatment/Session Assessment:   
· Response to Treatment:  Pt having a mild set back with tolerance to activity · Interdisciplinary Collaboration:  
o Registered Nurse · After treatment position/precautions:  
o Supine in bed 
o Bed/Chair-wheels locked 
o Bed in low position 
o Call light within reach 
o RN notified · Compliance with Program/Exercises: Will assess as treatment progresses.  
· Recommendations/Intent for next treatment session:  Treatment next visit will focus on increasing Ms. Rashad Alvarado independence with bed mobility, transfers, gait training, strength/ROM exercises, modalities for pain, and patient education. Total Treatment Duration: PT Patient Time In/Time Out Time In: 9474 Time Out: 0007 Jo Ann Roach PT

## 2019-03-30 NOTE — PROGRESS NOTES
03/30/19 0845 Oxygen Therapy O2 Sat (%) 90 % Pulse via Oximetry 77 beats per minute O2 Device Room air Patient achieved   1500  Ml/sec on IS. Patient encouraged to do every hour while awake-patient agreed and demonstrated. No shortness of breath or distress noted. BS are clear b/l. Patient a little sleepy today, no distress noted. Patient tolerated PEP therapy well.

## 2019-03-30 NOTE — PROGRESS NOTES
Pt sitting up on side of bed crying and hanging on to her left arm. She uis rating her pain 10/10,  1 mg dilaudid given SIVP,  Dressing to left shoulder clean dry and intact. Moving fingers well and has 2+ radial pulses. Got pt and ice pack and encouraged her to use it.

## 2019-03-30 NOTE — PROGRESS NOTES
2019 Post Op day: 2 Days Post-Op Admit Diagnosis: Primary osteoarthritis of left shoulder [M19.012] Primary osteoarthritis of left shoulder [M19.012] Osteoarthritis of left glenohumeral joint [M19.012] LAB:   
Recent Results (from the past 24 hour(s)) METABOLIC PANEL, BASIC Collection Time: 19  4:45 AM  
Result Value Ref Range Sodium 139 136 - 145 mmol/L Potassium 4.1 3.5 - 5.1 mmol/L Chloride 104 98 - 107 mmol/L  
 CO2 28 21 - 32 mmol/L Anion gap 7 mmol/L Glucose 106 (H) 65 - 100 mg/dL BUN 19 8 - 23 MG/DL Creatinine 0.69 0.6 - 1.0 MG/DL  
 GFR est AA >60 >60 ml/min/1.73m2 GFR est non-AA >60 ml/min/1.73m2 Calcium 8.6 8.3 - 10.4 MG/DL MAGNESIUM Collection Time: 19  4:45 AM  
Result Value Ref Range Magnesium 2.4 1.8 - 2.4 mg/dL CBC W/O DIFF Collection Time: 19  4:45 AM  
Result Value Ref Range WBC 6.3 4.3 - 11.1 K/uL  
 RBC 3.88 (L) 4.05 - 5.2 M/uL  
 HGB 10.8 (L) 11.7 - 15.4 g/dL HCT 34.3 (L) 35.8 - 46.3 % MCV 88.4 79.6 - 97.8 FL  
 MCH 27.8 26.1 - 32.9 PG  
 MCHC 31.5 31.4 - 35.0 g/dL  
 RDW 14.5 11.9 - 14.6 % PLATELET 099 067 - 567 K/uL MPV 9.1 (L) 9.4 - 12.3 FL ABSOLUTE NRBC 0.00 0.0 - 0.2 K/uL Vital Signs:   
Patient Vitals for the past 8 hrs: 
 BP Temp Pulse Resp SpO2  
19 0405 114/73 97.8 °F (36.6 °C) 78 18 97 % 19 0030 112/65 98.2 °F (36.8 °C) 74 16 97 % Temp (24hrs), Av.1 °F (36.7 °C), Min:97.2 °F (36.2 °C), Max:99.3 °F (37.4 °C) Pain Control:  
Pain Assessment Pain Scale 1: Numeric (0 - 10) Pain Intensity 1: 6 Pain Onset 1: post op Pain Location 1: Shoulder Pain Orientation 1: Left Pain Description 1: Aching Pain Intervention(s) 1: Medication (see MAR) Subjective: Doing well, pain well controlled, no complaints Objective:  No Acute Distress, Alert and Oriented, LUE supported in sling. Dressing is C/D/I. Neurovascular exam is normal 
  
 
Assessment: Patient Active Problem List  
Diagnosis Code  Superior glenoid labrum lesion U15.500J  Supraspinatus (muscle) (tendon) sprain O42.762M  Infraspinatus (muscle) (tendon) sprain L09.036J  Bicipital tenosynovitis M75.20  Primary localized osteoarthrosis, shoulder region M19.019  
 OA (osteoarthritis) of knee M17.10  
 Osteoarthritis of left glenohumeral joint M19.012  
 BMI 38.0-38.9,adult Z68.38  
 Primary osteoarthritis of left shoulder M19.012 Status Post Procedure(s) (LRB): REVERSE LEFT TOTAL SHOULDER ARTHROPLASTY WITH DELTA EXTEND PROSTHESIS, BICEPS TENODESIS (Left) Plan: Continue Physical Therapy per protocol. D/c to home today.   
Signed By: PHILIP Argueta

## 2019-03-30 NOTE — PROGRESS NOTES
03/29/19 2033 Oxygen Therapy O2 Sat (%) 94 % Pulse via Oximetry 84 beats per minute O2 Device Room air Patient placed on continuous sat monitor # 8. Monitor history deleted prior to placing on patient. Patient working on IS achieving 1250-1500ml x 10. Good effort and technique.

## 2019-03-31 VITALS
TEMPERATURE: 98.2 F | HEART RATE: 71 BPM | OXYGEN SATURATION: 95 % | RESPIRATION RATE: 16 BRPM | SYSTOLIC BLOOD PRESSURE: 117 MMHG | DIASTOLIC BLOOD PRESSURE: 74 MMHG

## 2019-03-31 LAB
ABO + RH BLD: NORMAL
ANION GAP SERPL CALC-SCNC: 7 MMOL/L
BLD PROD TYP BPU: NORMAL
BLD PROD TYP BPU: NORMAL
BLOOD GROUP ANTIBODIES SERPL: NORMAL
BPU ID: NORMAL
BPU ID: NORMAL
BUN SERPL-MCNC: 11 MG/DL (ref 8–23)
CALCIUM SERPL-MCNC: 8.3 MG/DL (ref 8.3–10.4)
CHLORIDE SERPL-SCNC: 103 MMOL/L (ref 98–107)
CO2 SERPL-SCNC: 28 MMOL/L (ref 21–32)
CREAT SERPL-MCNC: 0.52 MG/DL (ref 0.6–1)
CROSSMATCH RESULT,%XM: NORMAL
CROSSMATCH RESULT,%XM: NORMAL
ERYTHROCYTE [DISTWIDTH] IN BLOOD BY AUTOMATED COUNT: 14.3 % (ref 11.9–14.6)
GLUCOSE SERPL-MCNC: 101 MG/DL (ref 65–100)
HCT VFR BLD AUTO: 31.2 % (ref 35.8–46.3)
HGB BLD-MCNC: 10 G/DL (ref 11.7–15.4)
MAGNESIUM SERPL-MCNC: 2.3 MG/DL (ref 1.8–2.4)
MCH RBC QN AUTO: 28.2 PG (ref 26.1–32.9)
MCHC RBC AUTO-ENTMCNC: 32.1 G/DL (ref 31.4–35)
MCV RBC AUTO: 87.9 FL (ref 79.6–97.8)
NRBC # BLD: 0 K/UL (ref 0–0.2)
PLATELET # BLD AUTO: 186 K/UL (ref 150–450)
PMV BLD AUTO: 9.2 FL (ref 9.4–12.3)
POTASSIUM SERPL-SCNC: 3.9 MMOL/L (ref 3.5–5.1)
RBC # BLD AUTO: 3.55 M/UL (ref 4.05–5.2)
SODIUM SERPL-SCNC: 138 MMOL/L (ref 136–145)
SPECIMEN EXP DATE BLD: NORMAL
STATUS OF UNIT,%ST: NORMAL
STATUS OF UNIT,%ST: NORMAL
UNIT DIVISION, %UDIV: 0
UNIT DIVISION, %UDIV: 0
WBC # BLD AUTO: 5.7 K/UL (ref 4.3–11.1)

## 2019-03-31 PROCEDURE — 36415 COLL VENOUS BLD VENIPUNCTURE: CPT

## 2019-03-31 PROCEDURE — 97116 GAIT TRAINING THERAPY: CPT

## 2019-03-31 PROCEDURE — 94762 N-INVAS EAR/PLS OXIMTRY CONT: CPT

## 2019-03-31 PROCEDURE — 83735 ASSAY OF MAGNESIUM: CPT

## 2019-03-31 PROCEDURE — 77010033678 HC OXYGEN DAILY

## 2019-03-31 PROCEDURE — 85027 COMPLETE CBC AUTOMATED: CPT

## 2019-03-31 PROCEDURE — 74011250637 HC RX REV CODE- 250/637: Performed by: ORTHOPAEDIC SURGERY

## 2019-03-31 PROCEDURE — 80048 BASIC METABOLIC PNL TOTAL CA: CPT

## 2019-03-31 PROCEDURE — 97110 THERAPEUTIC EXERCISES: CPT

## 2019-03-31 RX ADMIN — DOCUSATE SODIUM 100 MG: 100 CAPSULE, LIQUID FILLED ORAL at 08:18

## 2019-03-31 RX ADMIN — ASPIRIN 325 MG: 325 TABLET, DELAYED RELEASE ORAL at 08:18

## 2019-03-31 RX ADMIN — PANTOPRAZOLE SODIUM 40 MG: 40 TABLET, DELAYED RELEASE ORAL at 05:48

## 2019-03-31 RX ADMIN — HYDROMORPHONE HYDROCHLORIDE 4 MG: 2 TABLET ORAL at 13:47

## 2019-03-31 RX ADMIN — HYDROMORPHONE HYDROCHLORIDE 4 MG: 2 TABLET ORAL at 08:18

## 2019-03-31 RX ADMIN — TEMAZEPAM 15 MG: 15 CAPSULE ORAL at 00:46

## 2019-03-31 RX ADMIN — Medication 400 MG: at 08:18

## 2019-03-31 RX ADMIN — HYDROMORPHONE HYDROCHLORIDE 4 MG: 2 TABLET ORAL at 00:43

## 2019-03-31 NOTE — PROGRESS NOTES
Patient resting quietly, alert and oriented, no distress noted. Left shoulder dressing c/d/i, voiding clear yellow urine, ambulates to bathroom. Neurovascular and peripheral vascular checks WNL. Bed low and locked position. Fresh ice placed on shoulder. Call light within reach. Patient instructed to call for assistance, verbalizes understanding. Nursing assessment complete.

## 2019-03-31 NOTE — PROGRESS NOTES
03/31/19 0845 Oxygen Therapy O2 Sat (%) 95 % Pulse via Oximetry 76 beats per minute O2 Device Room air O2 Flow Rate (L/min) 0 l/min FIO2 (%) 21 % Patient achieved   1750   Ml/sec on IS. Patient encouraged to do every hour while awake-patient agreed and demonstrated. No shortness of breath or distress noted. BS are clear b/l. Patient refused acupap tx at this time, patient alert, awake and in no distress.

## 2019-03-31 NOTE — PROGRESS NOTES
Problem: Mobility Impaired (Adult and Pediatric) Goal: *Acute Goals and Plan of Care (Insert Text) Description GOALS (1-4 days): (1.)  Patient will move from supine to sit and sit to supine  in bed with MODIFIED INDEPENDENCE. (2.)  Patient will transfer from bed to chair and chair to bed with INDEPENDENT using the least restrictive device. (3.)  Patient will ambulate with INDEPENDENT for 500 feet with the least restrictive device. (4.)  Patient will be independent with shoulder HEP to increase range of motion per MD orders. Met 3/30 
5) pt will climb 2 steps without rail independently. 
________________________________________________________________________________________________ Outcome: Progressing Towards Goal 
  
PHYSICAL THERAPY: Daily Note, Treatment Day: 2nd, AM 3/31/2019 INPATIENT: Hospital Day: 4 [place] / [place] / Product Type: Medicare /  
  
NAME/AGE/GENDER: Lenny Blankenship is a 77 y.o. female PRIMARY DIAGNOSIS: Primary osteoarthritis of left shoulder [M19.012] Primary osteoarthritis of left shoulder [M19.012] Osteoarthritis of left glenohumeral joint [M19.012] Osteoarthritis of left glenohumeral joint Osteoarthritis of left glenohumeral joint Procedure(s) (LRB): REVERSE LEFT TOTAL SHOULDER ARTHROPLASTY WITH DELTA EXTEND PROSTHESIS, BICEPS TENODESIS (Left) 3 Days Post-Op ICD-10: Treatment Diagnosis: 
 · Pain in Left Shoulder (M25.512) · Stiffness of Left Shoulder, Not elsewhere classified (M25.612) · Generalized Muscle Weakness (M62.81) · Difficulty in walking, Not elsewhere classified (R26.2) Precaution/Allergies: 
Sulfa (sulfonamide antibiotics); Trazodone; and Ultram [tramadol] ASSESSMENT:  
Ms. Divina Spann showed only slight unsteadiness that was self correcting. Pt was advised to have SBA for safety until increased independence is achieved.  Reviewed HEP without difficulty, pt will also need assist for now with shoulder flex AAROM to tolerance. This section established at most recent assessment PROBLEM LIST (Impairments causing functional limitations): 1. Decreased Herscher with Bed Mobility 2. Decreased Herscher with Transfers 3. Decreased Herscher with Ambulation 4. Decreased Herscher with shoulder HEP INTERVENTIONS PLANNED: (Benefits and precautions of physical therapy have been discussed with the patient.) 1. Bed Mobility Training 2. Transfer Training 3. Gait Training 4. Therapeutic Exercises per MD orders 5. Modalities for Pain TREATMENT PLAN: Frequency/Duration: twice daily for duration of hospital stay Rehabilitation Potential For Stated Goals: Good RECOMMENDED REHABILITATION/EQUIPMENT: (at time of discharge pending progress): Continue Skilled Therapy and Home Health: Physical Therapy. HISTORY:  
History of Present Injury/Illness (Reason for Referral): Reverse left TSA Past Medical History/Comorbidities: Ms. Raúl Shipley  has a past medical history of Arthritis, BMI 38.0-38.9,adult, Breast cancer ([address] (right), Chronic pain, GERD (gastroesophageal reflux disease), Insomnia, Leg cramps, Liver disease, and Scarlet fever. She also has no past medical history of Chronic kidney disease. Ms. Raúl Shipley  has a past surgical history that includes hx heent; hx tonsillectomy (1961); hx lap cholecystectomy; hx carpal tunnel release (Left); hx carpal tunnel release (Right); hx orthopaedic (Left); hx orthopaedic (Left); hx hysterectomy; hx knee arthroscopy (Right); hx bunionectomy (Right); hx rotator cuff repair (Right); hx breast biopsy (2018); and hx knee replacement (Right, 11/2017). Social History/Living Environment:  
Home Environment: Private residence # Steps to Enter: 2 Rails to Enter: No 
One/Two Story Residence: One story Living Alone: Yes Support Systems: Family member(s) Patient Expects to be Discharged to[de-identified] Private residence Current DME Used/Available at Home: None Prior Level of Function/Work/Activity: Pt was independent without an assistive device prior tot his admission. Number of Personal Factors/Comorbidities that affect the Plan of Care: 3+: HIGH COMPLEXITY EXAMINATION:  
Most Recent Physical Functioning:  
Gross Assessment: 
AROM: Within functional limits(right UE & both LE's) Strength: Within functional limits(right UE & both LE's) Coordination: Within functional limits(right UE & both LE's) Balance: 
Sitting: Intact; Without support Standing: Impaired; Without support Bed Mobility: 
Supine to Sit: Supervision Sit to Supine: (NT) Scooting: Supervision Transfers: 
Sit to Stand: Stand-by assistance Stand to Sit: Stand-by assistance Bed to Chair: Stand-by assistance Gait: 
  
Speed/Francia: Delayed Gait Abnormalities: Decreased step clearance(mild unsteadiness on turns) Distance (ft): 400 Feet (ft) Assistive Device: (none) Ambulation - Level of Assistance: Stand-by assistance Duration: 11 Minutes Functional Mobility:  
      Gait/Ambulation:  sba Transfers:  sba Bed Mobility:  sup Body Structures Involved: 1. Joints 2. Muscles Body Functions Affected: 1. Sensory/Pain 2. Movement Related Activities and Participation Affected: 1. General Tasks and Demands 2. Mobility Number of elements that affect the Plan of Care: 4+: HIGH COMPLEXITY CLINICAL PRESENTATION:  
Presentation: Stable and uncomplicated: LOW COMPLEXITY CLINICAL DECISION MAKING:  
[employer] 6 Clicks Basic Mobility Inpatient Short Form How much difficulty does the patient currently have. .. Unable A Lot A Little None 1. Turning over in bed (including adjusting bedclothes, sheets and blankets)? ? 1   ? 2   ? 3   ? 4  
2. Sitting down on and standing up from a chair with arms ( e.g., wheelchair, bedside commode, etc.)   ? 1   ? 2   ? 3   ? 4  
3. Moving from lying on back to sitting on the side of the bed?   ? 1   ? 2   ? 3   ? 4 How much help from another person does the patient currently need. .. Total A Lot A Little None 4. Moving to and from a bed to a chair (including a wheelchair)? ? 1   ? 2   ? 3   ? 4  
5. Need to walk in hospital room? ? 1   ? 2   ? 3   ? 4  
6. Climbing 3-5 steps with a railing? ? 1   ? 2   ? 3   ? 4  
© 2007, Trustees of [facility] [employer], under license to [employer]. All rights reserved Score:  Initial: 16 Most Recent: X (Date: -- ) Interpretation of Tool:  Represents activities that are increasingly more difficult (i.e. Bed mobility, Transfers, Gait). Medical Necessity:    
· Patient is expected to demonstrate progress in strength, range of motion, balance, coordination and functional technique ·  to decrease assistance required with bed mobility, transfers, gait & HEP · . Reason for Services/Other Comments: 
· Patient continues to require skilled intervention due to pt not independent with functional mobility & HEP · . Use of outcome tool(s) and clinical judgement create a POC that gives a: Clear prediction of patient's progress: LOW COMPLEXITY  
  
 
 
 
TREATMENT:  
(In addition to Assessment/Re-Assessment sessions the following treatments were rendered) Pre-treatment Symptoms/Complaints:  \" I feel better \" 
Pain: Initial: visual scale Pain Intensity 1: 3 Pain Location 1: Shoulder Pain Orientation 1: Left Pain Intervention(s) 1: Exercise  Post Session:  3/10 Therapeutic Exercise: (12 Minutes):  Exercises per grid below to improve mobility and dynamic movement of arm - left to improve functional endurance . Required minimal verbal cues to promote proper body alignment and promote proper body mechanics. Progressed repetitions as indicated.  
Gait Training (11 Minutes):  Gait training to improve and/or restore physical functioning as related to mobility, strength, balance, coordination and dynamic movement of leg - bilateral to improve functional gait.  Ambulated 400 Feet (ft) with Stand-by assistance using a (none) and minimal cues related to their stride length and heel strike to promote proper body alignment, promote proper body posture and promote proper body mechanics. Date: 
3/29 Date: 
3/30 Date: 
3/31 ACTIVITY/EXERCISE AM PM AM PM AM PM  
Gripping 15 15 25 25 25 Wrist Flexion/Extension 15 15 25 25 25 Wrist Ulnar/Radial Deviation Pronation/Supination 15 15 25 25 25 Elbow Flexion/Extension 15aa 15 25 25 25 Shoulder Flexion/Extension 15aa flex to tolerance 15aa flex to tolerance 25aa flex to tolerance 25aa flex to tolerance 25aa flex to tolerance Shoulder AB/ADduction Shoulder IR/ER Pulleys Pendulums 15aa clock & counter clockwise 15aa clock & counter clockwise 25aa Clock & counter clockwise 25aa clock & counter clockwise 25aa clock & counter clockwise Shrugs Isometric:                 Flexion Extension ABduction ADduction Biceps/Triceps B = bilateral; AA = active assistive; A = active; P = passive Education: ? Home Exercises  ? Sling Application   ? Movement Precautions ? Pulleys   ? Use of Ice   ? Other:  
Treatment/Session Assessment:   
· Response to Treatment:  Pt seems to be back on track, pt will likely be non-compliant with safety recommendations · Interdisciplinary Collaboration:  
o Registered Nurse · After treatment position/precautions:  
o Up in chair 
o Bed/Chair-wheels locked 
o Call light within reach 
o RN notified · Compliance with Program/Exercises: Will assess as treatment progresses. · Recommendations/Intent for next treatment session:  Treatment next visit will focus on increasing Ms. Dilma Duke independence with bed mobility, transfers, gait training, strength/ROM exercises, modalities for pain, and patient education. Total Treatment Duration: PT Patient Time In/Time Out Time In: 8011 Time Out: 0756 Thai Hernandez, PT

## 2019-03-31 NOTE — PROGRESS NOTES
2019 Post Op day: 3 Days Post-Op Admit Diagnosis: Primary osteoarthritis of left shoulder [M19.012] Primary osteoarthritis of left shoulder [M19.012] Osteoarthritis of left glenohumeral joint [M19.012] LAB:   
Recent Results (from the past 24 hour(s)) METABOLIC PANEL, BASIC Collection Time: 19  4:46 AM  
Result Value Ref Range Sodium 138 136 - 145 mmol/L Potassium 3.9 3.5 - 5.1 mmol/L Chloride 103 98 - 107 mmol/L  
 CO2 28 21 - 32 mmol/L Anion gap 7 mmol/L Glucose 101 (H) 65 - 100 mg/dL BUN 11 8 - 23 MG/DL Creatinine 0.52 (L) 0.6 - 1.0 MG/DL  
 GFR est AA >60 >60 ml/min/1.73m2 GFR est non-AA >60 ml/min/1.73m2 Calcium 8.3 8.3 - 10.4 MG/DL MAGNESIUM Collection Time: 19  4:46 AM  
Result Value Ref Range Magnesium 2.3 1.8 - 2.4 mg/dL CBC W/O DIFF Collection Time: 19  4:46 AM  
Result Value Ref Range WBC 5.7 4.3 - 11.1 K/uL  
 RBC 3.55 (L) 4.05 - 5.2 M/uL  
 HGB 10.0 (L) 11.7 - 15.4 g/dL HCT 31.2 (L) 35.8 - 46.3 % MCV 87.9 79.6 - 97.8 FL  
 MCH 28.2 26.1 - 32.9 PG  
 MCHC 32.1 31.4 - 35.0 g/dL  
 RDW 14.3 11.9 - 14.6 % PLATELET 114 880 - 185 K/uL MPV 9.2 (L) 9.4 - 12.3 FL ABSOLUTE NRBC 0.00 0.0 - 0.2 K/uL Vital Signs:   
Patient Vitals for the past 8 hrs: 
 BP Temp Pulse Resp SpO2  
19 0713 117/74 98.2 °F (36.8 °C) 71 16 98 % 19 0355 126/74 98.4 °F (36.9 °C) 71 16 97 % Temp (24hrs), Av.5 °F (36.9 °C), Min:98 °F (36.7 °C), Max:99.7 °F (37.6 °C) Pain Control:  
Pain Assessment Pain Scale 1: Numeric (0 - 10) Pain Intensity 1: 5 Pain Onset 1: post op Pain Location 1: Shoulder Pain Orientation 1: Left Pain Description 1: Aching Pain Intervention(s) 1: Medication (see MAR) Subjective: Doing well, pain is well controlled, no complaints Objective:  No Acute Distress, Alert and Oriented, left shoulder dressing is C/D/I. LUE supported in sling.   Neurovascular exam is normal 
  
 
 Assessment:  
Patient Active Problem List  
Diagnosis Code  Superior glenoid labrum lesion N65.206Q  Supraspinatus (muscle) (tendon) sprain Y11.862T  Infraspinatus (muscle) (tendon) sprain L71.780V  Bicipital tenosynovitis M75.20  Primary localized osteoarthrosis, shoulder region M19.019  
 OA (osteoarthritis) of knee M17.10  
 Osteoarthritis of left glenohumeral joint M19.012  
 BMI 38.0-38.9,adult Z68.38  
 Primary osteoarthritis of left shoulder M19.012 Status Post Procedure(s) (LRB): REVERSE LEFT TOTAL SHOULDER ARTHROPLASTY WITH DELTA EXTEND PROSTHESIS, BICEPS TENODESIS (Left) Plan: Continue Physical Therapy per protocol. D/c to  home today.   
Signed By: PHILIP Tabares

## 2019-03-31 NOTE — DISCHARGE INSTRUCTIONS
DISCHARGE SUMMARY from Nurse    The following personal items collected during your admission are returned to you:   Dental Appliance: Dental Appliances: Lowers; Other (comment) (bridge )  Vision: Visual Aid: Glasses  Hearing Aid:   na  Jewelry: Jewelry: None  Clothing: Clothing: At bedside  Other Valuables: Other Valuables: None  Valuables sent to safe:   na          PATIENT INSTRUCTIONS:    New Medications:    Dilaudid 2 mg tabs Take 1-2 tabs every 4-6 hrs as needed for pain. Phenergan 25 mg tabs Take 1 tab every 8 hrs as needed for nausea. Restoril 10 mg tabs Take 1 tab at bedtime as needed for sleep. After general anesthesia or intravenous sedation, for 24 hours or while taking prescription Narcotics:  · Limit your activities  · Do not drive and operate hazardous machinery  · Do not make important personal or business decisions  · Do  not drink alcoholic beverages  · If you have not urinated within 8 hours after discharge, please contact your surgeon on call. Report the following to your surgeon:  · Excessive pain, swelling, redness or odor of or around the surgical area  · Temperature over 101  · Nausea and vomiting lasting longer than 4 hours or if unable to take medications  · Any signs of decreased circulation or nerve impairment to extremity: change in color, persistent  numbness, tingling, coldness or increase pain  · Any questions, call office @ [phone]. What to do at Home:  Recommended activity: activity as tolerated, as instructed per Dr. Rafaela Herron. Continue with exercises taught by Physical Therapy. Resume per hospital diet. Wear sling to right arm. Use ice and elevate arm to decrease pain and swelling. If you experience any of the following symptoms temp>101, pain unrelieved by meds, or persistent nausea or vomitting, please follow up with Dr. Rafaela Herron @ [phone]. *  Please give a list of your current medications to your Primary Care Provider.     *  Please update this list whenever your medications are discontinued, doses are      changed, or new medications (including over-the-counter products) are added. *  Please carry medication information at all times in case of emergency situations. Shoulder Replacement Surgery: What to Expect at Home  Your Recovery  Shoulder replacement surgery replaces the worn parts of your shoulder joint. When you leave the hospital, your arm will be in a sling. It will be helpful if there is someone to help you at home for the next few weeks or until you have more energy and can move around better. You will go home with a bandage and stitches or staples. You can remove the bandage when your doctor tells you to. If the stitches are not the type that dissolve, your doctor will remove them in 10 to 14 days. You may still have some mild pain, and the area may be swollen for several months after surgery. Your doctor will give you medicine for the pain. You will continue the rehabilitation program (rehab) you started in the hospital. The better you do with your rehab exercises, the sooner you will get your strength and movement back. Depending on your job, you may be able to return to work as early as 2 to 3 weeks after surgery, as long as you avoid certain arm movements, such as lifting. This care sheet gives you a general idea about how long it will take for you to recover. But each person recovers at a different pace. Follow the steps below to get better as quickly as possible. How can you care for yourself at home? Activity  · Rest when you feel tired. You may take a nap, but don't stay in bed all day. · Work with your physical therapist to learn the best way to exercise. · You will have a sling to wear at night. And it's a good idea to also put a small stack of folded sheets or towels under your upper arm while you are in bed to keep your arm from dropping too far back.   · Your arm should stay next to your body or in front of it for several weeks, both while you are up and during sleep. · Don't lift anything with the affected arm for 6 weeks. · You may need to take sponge baths until your stitches or staples have been removed. You will probably be able to shower 24 hours after they are removed. · Ask your doctor when you can drive again. · Ask your doctor when it is okay for you to have sex. · Your doctor may advise you to give up activities that put stress on that shoulder. This includes sports such as weight lifting or tennis, unless your tennis arm was not the one affected. Diet  · By the time you leave the hospital, you will probably be eating your normal diet. If your stomach is upset, try bland, low-fat foods like plain rice, broiled chicken, toast, and yogurt. Your doctor may recommend that you take iron and vitamin supplements. · Drink plenty of fluids (unless your doctor tells you not to). · You may notice that your bowel movements are not regular right after your surgery. This is common. Try to avoid constipation and straining with bowel movements. You may want to take a fiber supplement every day. If you have not had a bowel movement after a couple of days, ask your doctor about taking a mild laxative. Medicines  · Be safe with medicines. Take pain medicines exactly as directed. ¨ If the doctor gave you a prescription medicine for pain, take it as prescribed. ¨ If you are not taking a prescription pain medicine, ask your doctor if you can take an over-the-counter medicine. · If you think your pain medicine is making you sick to your stomach:  ¨ Take your medicine after meals (unless your doctor has told you not to). ¨ Ask your doctor for a different pain medicine. · If your doctor prescribed antibiotics, take them as directed. Don't stop taking them just because you feel better. You need to take the full course of antibiotics. · If you take a blood thinner, be sure you get instructions about how to take your medicine safely. Blood thinners can cause serious bleeding problems. Incision care  · You will have a dressing over the cut (incision). A dressing helps the incision heal and protects it. Your doctor will tell you how to take care of this. · Keep the area clean and dry. Exercise  · Shoulder rehabilitation is a series of exercises you do after your surgery. This helps you get back your shoulder's range of motion and strength. You will work with your doctor and physical therapist to plan this exercise program. To get the best results, you need to do the exercises correctly and as often and as long as your doctor tells you. Ice  · For pain, put ice or a cold pack on the area for 10 to 20 minutes at a time. Put a thin cloth between the ice and your skin. Other instructions  · Wear medical alert jewelry that says you may need antibiotics before any procedure, including dental work. You can buy this at most drugstores. Follow-up care is a key part of your treatment and safety. Be sure to make and go to all appointments, and call your doctor if you are having problems. It's also a good idea to know your test results and keep a list of the medicines you take. When should you call for help? Call 911 anytime you think you may need emergency care. For example, call if:  · You have severe trouble breathing. · You have symptoms of a blood clot in your lung (called a pulmonary embolism). These may include:  ¨ Sudden chest pain. ¨ Trouble breathing. ¨ Coughing up blood. Call your doctor now or seek immediate medical care if:  · You have severe or increasing pain. · You have symptoms of infection, such as:  ¨ Increased pain, swelling, warmth, or redness. ¨ Red streaks or pus. ¨ A fever. · You have tingling, weakness, or numbness in your arm. · Your arm turns cold or changes color. · You have symptoms of a blood clot in your leg (called a deep vein thrombosis).  These may include:  ¨ Pain in the calf, back of the knee, thigh, or groin.  ¨ Redness and swelling in the leg or groin. Watch closely for changes in your health, and be sure to contact your doctor if:  · You do not get better as expected. Where can you learn more? Go to [employer]  Enter I179 in the search box to learn more about \"Shoulder Replacement Surgery: What to Expect at Home. \"   © 9658-7542 [employer], [employer]. Care instructions adapted under license by [employer] (which disclaims liability or warranty for this information). This care instruction is for use with your licensed healthcare professional. If you have questions about a medical condition or this instruction, always ask your healthcare professional. [place] [address] any warranty or liability for your use of this information. Content Version: 2.1.424298; Last Revised: August 6, 2013                These are general instructions for a healthy lifestyle:    No smoking/ No tobacco products/ Avoid exposure to second hand smoke    Surgeon General's Warning:  Quitting smoking now greatly reduces serious risk to your health. Obesity, smoking, and sedentary lifestyle greatly increases your risk for illness    A healthy diet, regular physical exercise & weight monitoring are important for maintaining a healthy lifestyle    You may be retaining fluid if you have a history of heart failure or if you experience any of the following symptoms:  Weight gain of 3 pounds or more overnight or 5 pounds in a week, increased swelling in our hands or feet or shortness of breath while lying flat in bed. Please call your doctor as soon as you notice any of these symptoms; do not wait until your next office visit.     Recognize signs and symptoms of STROKE:    F-face looks uneven    A-arms unable to move or move unevenly    S-speech slurred or non-existent    T-time-call 911 as soon as signs and symptoms begin-DO NOT go       Back to bed or wait to see if you get better-TIME IS BRAIN.        The discharge information has been reviewed with the patient. The patient verbalized understanding.

## 2019-03-31 NOTE — PROGRESS NOTES
Medicated with dilaudid 4 mg PO for c/o pain and restoril for sleep, see MAR. Assisted patient to bathroom to void and back to bed, fresh ice placed on shoulder for comfort.

## 2019-04-02 NOTE — DISCHARGE SUMMARY
[address] [facility] SUMMARY    Name:  Kylie Cedillo  MR#:  [id number]  :  1950  ACCOUNT #:  [de-identified]  ADMIT DATE:  2019  DISCHARGE DATE:  2019    ADMISSION DIAGNOSIS:  Glenohumeral osteoarthritis, left shoulder. DISCHARGE DIAGNOSIS:  Glenohumeral osteoarthritis, left shoulder. HISTORY OF PRESENT ILLNESS:  Please see H and P, operative summary, and consult for the details. The patient is a 71-year-old female who was admitted on 2019 and underwent an uncomplicated reverse left total shoulder arthroplasty with a Delta Xtend prosthesis and biceps tenodesis. HOSPITAL COURSE:  On postoperative day #1, hemoglobin was 11.3, potassium was 4.3 and magnesium was 2.3. She was begun on therapy as per reverse total arthroplasty protocol. She was having pain. On postoperative day #2, her pain actually got increased in severity, and on postoperative day #2, all labs were within normal limits. On postoperative day #3, her pain was well controlled. She was discharged home on postoperative day #3. She will continue therapy on the outside and follow up in my office in 10 days. MD LARISSA [email or web address]  D:  2019 9:53  T:  2019 14:12  JOB #:  [id number]  CC:   Lj Brooks MD

## 2019-04-03 LAB — GLUCOSE BLD STRIP.AUTO-MCNC: 93 MG/DL (ref 65–100)

## 2019-04-26 ENCOUNTER — HOSPITAL ENCOUNTER (OUTPATIENT)
Dept: PHYSICAL THERAPY | Age: 69
Discharge: HOME | End: 2019-04-26
Payer: MEDICARE

## 2019-04-26 PROCEDURE — 97110 THERAPEUTIC EXERCISES: CPT

## 2019-04-26 PROCEDURE — 97140 MANUAL THERAPY 1/> REGIONS: CPT

## 2019-04-26 PROCEDURE — 97161 PT EVAL LOW COMPLEX 20 MIN: CPT

## 2019-04-26 NOTE — PROGRESS NOTES
Mone Reno  : 1950  [place]  Secondary: [place] [employer] [id number] @ [address]  [address] [place] [address] [place] GE[place] Kasie.  [phone]   [phone]      OUTPATIENT PHYSICAL THERAPY: Daily Treatment Note  2019    Treatment Diagnosis: left shoulder pain        _________________________________________________________________________  Pre-treatment Symptoms/Complaints:  See initial evaluation. Pain: Initial: 3/10 Post Session:  2/10   Medications Last Reviewed:  2019  Updated Objective Findings:  See evaluation note from today     TREATMENT:   THERAPEUTIC EXERCISE: (see below for minutes):  Exercises per grid below to improve mobility, strength, balance and coordination. Required minimal verbal and manual cues to promote proper body alignment, promote proper body posture and promote proper body mechanics. Progressed resistance, range, repetitions and complexity of movement as indicated. MANUAL THERAPY: (see below for minutes): Joint mobilization and Soft tissue mobilization was utilized and necessary because of the patient's restricted joint motion, painful spasm, loss of articular motion and restricted motion of soft tissue.    MODALITIES: (see below for minutes):      to decrease pain      Date: 19       Modalities:                                Therapeutic Exercise: 15 min       Cane Into flexion [id number]       Isometrics 4 way at door [id number]       Scapular retraction [id number]                               Proprioceptive Activities:                                Manual Therapy: 15 min       Passive physiologic mobilizations Into ER and flexion gr 2 to 3               Therapeutic Activities:                                  HEP: Patient is independent with performance of above described home program.    [employer] Portal  Treatment/Session Summary:    · Response to Treatment:  Patient is independent with performance of above described home program.  · Communication/Consultation:  None today  · Equipment provided today:  None today  · Recommendations/Intent for next treatment session: Next visit will focus on progression of mobility and relief of pain.     Total Treatment Billable Duration:  45 min  PT Patient Time In/Time Out  Time In: 1145  Time Out: [address] [place], DPT

## 2019-04-26 NOTE — THERAPY EVALUATION
Leif Abreu  : 1950 [id number] [facility] [address]  [phone]   [phone]        OUTPATIENT PHYSICAL THERAPY:Initial Assessment 2019         ICD-10: Treatment Diagnosis: Pain in left shoulder (M25.512); Precautions/Allergies:   Sulfa (sulfonamide antibiotics); Trazodone; and Ultram [tramadol]   Fall Risk Score:     Ambulatory/Rehab Services H2 Model Falls Risk Assessment    Risk Factors:       No Risk Factors Identified Ability to Rise from Chair:       (0)  Ability to rise in a single movement    Falls Prevention Plan:       No modifications necessary   Total: (5 or greater = High Risk): 0     [facility] [employer]. All Rights Reserved. [facility] [employer] Patent #[id number]. 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 Genia Astudillo presents to therapy following above surgical repair. She has impairments in mobility of the shoulder complex specifically into external rotation and elevation. Internal rotation is not evaluated at this point due to healing precautions. She has weakness throughout the shoulder complex due to atrophy and continued healing of surgical procedure. This causes difficulty with all activities that involve use of the left arm. Skilled therapy is required to return to prior level of function. PROBLEM LIST (Impacting functional limitations):  1. Decreased Strength  2. Decreased ADL/Functional Activities  3.  Increased Pain  4. Decreased Activity Tolerance  5. Decreased Flexibility/Joint Mobility INTERVENTIONS PLANNED:  1. Family Education  2. Home Exercise Program (HEP)  3. Manual Therapy  4. Neuromuscular Re-education/Strengthening  5. Range of Motion (ROM)  6. Therapeutic Activites  7. Therapeutic Exercise/Strengthening  8. modalities    TREATMENT PLAN:  Effective Dates: 4/26/2019 TO 7/25/2019 (90 days). Frequency/Duration: 2 times a week for 90 Days  GOALS: (Goals have been discussed and agreed upon with patient.)  Short-Term Functional Goals: Time Frame: 2 weeks  1. Patient will be independent with HEP. 2. Patient will decrease pain to allow her to sleep through the night. Discharge Goals: Time Frame: 12 weeks  1. Patient will improve active shoulder flexion to 140 ° to restore mobility required for reaching into an overhead cabinet. 2. Patient will have no pain with daily activity. 3. Patient will improve strength into shoulder elevation to 5/5 to restore capacity for reaching and carrying activities. Rehabilitation Potential For Stated Goals: Excellent  Regarding Raulito Dill therapy, I certify that the treatment plan above will be carried out by a therapist or under their direction. Thank you for this referral,  Chapo Polanco DPT       Referring Physician Signature: Marleen Awan MD              Date                      HISTORY:   History of Present Injury/Illness (Reason for Referral):  Patient presents to therapy following above surgical repair. Prior to surgery she notes a 2+ year history of shoulder pain that was significantly exacerbated with caring for her mother with tasks that involved extensive lifting and assistance with transfers. She notes no complications with surgery and previously had approximately 6 sessions of home health therapy. She continues to have discomfort through the shoulder that she describes as aching and soreness.  This intermittently causes difficulty with sleeping at night. She has minimal pain during the day when she is active. Past Medical History/Comorbidities:   Ms. Pako Fitzgerald  has a past medical history of Arthritis, BMI 38.0-38.9,adult, Breast cancer ([facility] [address] (right), Chronic pain, GERD (gastroesophageal reflux disease), Insomnia, Leg cramps, Liver disease, and Scarlet fever. She also has no past medical history of Chronic kidney disease. Ms. Pako Fitzgerald  has a past surgical history that includes hx heent; hx tonsillectomy (1961); hx lap cholecystectomy; hx carpal tunnel release (Left); hx carpal tunnel release (Right); hx orthopaedic (Left); hx orthopaedic (Left); hx hysterectomy; hx knee arthroscopy (Right); hx bunionectomy (Right); hx rotator cuff repair (Right); hx breast biopsy (2018); and hx knee replacement (Right, 11/2017). Social History/Living Environment:     Patient lives by herself. Prior Level of Function/Work/Activity:  Patient is retired. Dominant Side:         RIGHT  Current Medications:    Current Outpatient Medications:     diclofenac EC (VOLTAREN) 75 mg EC tablet, Take 75 mg by mouth two (2) times a day., Disp: , Rfl:     amitriptyline (ELAVIL) 10 mg tablet, Take 10 mg by mouth nightly., Disp: , Rfl:     melatonin 1 mg tablet, Take 1 mg by mouth nightly., Disp: , Rfl:     acetaminophen (TYLENOL) 500 mg tablet, Take 2 Tabs by mouth every six (6) hours. , Disp: 120 Tab, Rfl: 0    aspirin delayed-release 325 mg tablet, Take 1 Tab by mouth every twelve (12) hours every twelve (12) hours. (Patient taking differently: Take 325 mg by mouth nightly.), Disp: 60 Tab, Rfl: 0    cyclobenzaprine (FLEXERIL) 10 mg tablet, Take 0.5 Tabs by mouth three (3) times daily. , Disp: 60 Tab, Rfl: 0    gabapentin (NEURONTIN) 100 mg capsule, Take 1 Cap by mouth two (2) times a day., Disp: 60 Cap, Rfl: 0    HYDROmorphone (DILAUDID) 2 mg tablet, Take 1 Tab by mouth every four (4) hours as needed.  Max Daily Amount: 12 mg., Disp: 90 Tab, Rfl: 0    ondansetron (ZOFRAN ODT) 8 mg disintegrating tablet, Take 1 Tab by mouth every eight (8) hours as needed for Nausea., Disp: 60 Tab, Rfl: 0    senna-docusate (PERICOLACE) 8.6-50 mg per tablet, Take 2 Tabs by mouth daily. , Disp: 120 Tab, Rfl: 0    zolpidem (AMBIEN) 5 mg tablet, Take 1 Tab by mouth nightly as needed for Sleep. Max Daily Amount: 5 mg., Disp: 60 Tab, Rfl: 0    omeprazole (PRILOSEC) 20 mg capsule, Take 20 mg by mouth Daily (before breakfast). Take / use AM day of surgery  per anesthesia protocols. Indications: GASTROESOPHAGEAL REFLUX, Disp: , Rfl:     multivitamin (ONE A DAY) tablet, Take 1 Tab by mouth daily. , Disp: , Rfl:     VITAMIN E, DL,TOCOPHERYL ACET, (VITAMIN E ACETATE) 400 unit cap capsule, Take 400 Units by mouth every other day., Disp: , Rfl:     magnesium oxide (MAG-OX) 400 mg tablet, Take 400 mg by mouth two (2) times a day. Indications: muscle cramps, Disp: , Rfl:     b complex vitamins (B COMPLEX 1) tablet, Take 1 Tab by mouth every other day., Disp: , Rfl:     ascorbic acid (VITAMIN C) 500 mg tablet, Take 500 mg by mouth every other day., Disp: , Rfl:    Date Last Reviewed:  4/26/2019   # of Personal Factors/Comorbidities that affect the Plan of Care: 0: LOW COMPLEXITY   EXAMINATION:   Observation/Orthostatic Postural Assessment:          Incision site is closed and mobile. Scapular position is symmetric. Palpation:          Mild tenderness along incision site. ROM:     All passive:   Shoulder flexion: 110 °   Shoulder ER: 50 ° in scaption  IR: not currently indicated  Elbow wrist and hand mobility are full. Strength:     Not currently indicated. Special Tests:          Not applicable  Neurological Screen:        Grossly intact. Functional Mobility:         independent  Balance: WNL   Body Structures Involved:  1. Nerves  2. Bones  3. Joints  4. Muscles  5. Ligaments Body Functions Affected:  1. Sensory/Pain  2. Neuromusculoskeletal  3.  Movement Related Activities and Participation Affected:  1. General Tasks and Demands  2. Mobility  3. Self Care  4. Domestic Life  5. Interpersonal Interactions and Relationships  6. Community, Social and [place]   # of elements that affect the Plan of Care: 1-2: LOW COMPLEXITY   CLINICAL PRESENTATION:   Presentation: Stable and uncomplicated: LOW COMPLEXITY   CLINICAL DECISION MAKING:   Tool Used: Disabilities of the Arm, Shoulder and Hand (DASH) Questionnaire - Quick Version  Score:  Initial: 23/55  Most Recent: X/55 (Date: -- )   Interpretation of Score: The DASH is designed to measure the activities of daily living in person's with upper extremity dysfunction or pain. Each section is scored on a 1-5 scale, 5 representing the greatest disability. The scores of each section are added together for a total score of 55. Medical Necessity:   · Patient is expected to demonstrate progress in strength, range of motion, balance and coordination to increase independence with activities that involve use of the left arm. Reason for Services/Other Comments:  · Patient has demonstrated an improvement in functional level by independent performance of HEP.    Use of outcome tool(s) and clinical judgement create a POC that gives a: Clear prediction of patient's progress: LOW COMPLEXITY     Total Treatment Duration:  PT Patient Time In/Time Out  Time In: 1145  Time Out: [address], DPT

## 2019-04-29 ENCOUNTER — HOSPITAL ENCOUNTER (OUTPATIENT)
Dept: PHYSICAL THERAPY | Age: 69
Discharge: HOME | End: 2019-04-29
Payer: MEDICARE

## 2019-04-29 PROCEDURE — 97140 MANUAL THERAPY 1/> REGIONS: CPT

## 2019-04-29 NOTE — PROGRESS NOTES
Elzbieta Marquez  : 1950  [place]  Secondary: [place] [employer] [id number] @ [address]  [address] [place] [address].  [phone]   [phone]      OUTPATIENT PHYSICAL THERAPY: Daily Treatment Note  2019    Treatment Diagnosis: left shoulder pain    Effective Dates: 2019 TO 2019 (90 days). Frequency/Duration: 2 times a week for 90 Days  GOALS: (Goals have been discussed and agreed upon with patient.)  Short-Term Functional Goals: Time Frame: 2 weeks  1. Patient will be independent with HEP. 2. Patient will decrease pain to allow her to sleep through the night. Discharge Goals: Time Frame: 12 weeks  1. Patient will improve active shoulder flexion to 140 ° to restore mobility required for reaching into an overhead cabinet. 2. Patient will have no pain with daily activity. 3. Patient will improve strength into shoulder elevation to 5/5 to restore capacity for reaching and carrying activities. _________________________________________________________________________  Pre-treatment Symptoms/Complaints:  \"It's been doing ok. It was sore last night for some reason. The exercises are going ok. Pain: Initial: 3/10 Post Session:  2/10   Medications Last Reviewed:  2019  Updated Objective Findings:  See evaluation note from today     TREATMENT:   THERAPEUTIC EXERCISE: (see below for minutes):  Exercises per grid below to improve mobility, strength, balance and coordination. Required minimal verbal and manual cues to promote proper body alignment, promote proper body posture and promote proper body mechanics. Progressed resistance, range, repetitions and complexity of movement as indicated.   MANUAL THERAPY: (see below for minutes): Joint mobilization and Soft tissue mobilization was utilized and necessary because of the patient's restricted joint motion, painful spasm, loss of articular motion and restricted motion of soft tissue. MODALITIES: (see below for minutes):      to decrease pain      Date: 4/26/19 4/29/19 (visit 2)      Modalities:                                Therapeutic Exercise: 15 min       Cane Into flexion [id number]       Isometrics 4 way at door [id number]       Scapular retraction [id number]                               Proprioceptive Activities:                                Manual Therapy: 15 min 45 min      Passive physiologic mobilizations Into ER and flexion gr 2 to 3 Gr 2 to 3+  + focusing on ER and flexion, including STM to subacromial space              Therapeutic Activities:                                  HEP: Patient is independent with performance of above described home program.    [employer] Portal  Treatment/Session Summary:    · Response to Treatment:  End range is limited by stiffness. Continues to gradually progress with gentle end range mobilizations. · Communication/Consultation:  None today  · Equipment provided today:  None today  · Recommendations/Intent for next treatment session: Next visit will focus on progression of mobility and relief of pain.     Total Treatment Billable Duration:  45 min  PT Patient Time In/Time Out  Time In: 9795  Time Out: [address], DPT

## 2019-05-01 ENCOUNTER — HOSPITAL ENCOUNTER (OUTPATIENT)
Dept: PHYSICAL THERAPY | Age: 69
Discharge: HOME | End: 2019-05-01
Payer: MEDICARE

## 2019-05-01 PROCEDURE — 97140 MANUAL THERAPY 1/> REGIONS: CPT

## 2019-05-01 PROCEDURE — 97014 ELECTRIC STIMULATION THERAPY: CPT

## 2019-05-01 PROCEDURE — 97110 THERAPEUTIC EXERCISES: CPT

## 2019-05-01 NOTE — PROGRESS NOTES
Leif Abreu  : 1950  [place]  Secondary: [place] [employer] [id number] @ [address]  [phone]   [phone]      OUTPATIENT PHYSICAL THERAPY: Daily Treatment Note  2019         Effective Dates: 2019 TO 2019 (90 days). Frequency/Duration: 2 times a week for 90 Days  GOALS: (Goals have been discussed and agreed upon with patient.)  Short-Term Functional Goals: Time Frame: 2 weeks  1. Patient will be independent with HEP. 2. Patient will decrease pain to allow her to sleep through the night. Discharge Goals: Time Frame: 12 weeks  1. Patient will improve active shoulder flexion to 140 ° to restore mobility required for reaching into an overhead cabinet. 2. Patient will have no pain with daily activity. 3. Patient will improve strength into shoulder elevation to 5/5 to restore capacity for reaching and carrying activities. _________________________________________________________________________  Pre-treatment Symptoms/Complaints: \"The pain is not really worth mentioning. I'm having trouble sleeping. \"  Pain: Initial: 1-2/10 L shoulder Post Session:  No increase   Medications Last Reviewed:  2019  Updated Objective Findings:  No changes today. TREATMENT:   THERAPEUTIC EXERCISE: (see below for minutes):  Exercises per grid below to improve mobility, strength, balance and coordination. Required minimal verbal and manual cues to promote proper body alignment, promote proper body posture and promote proper body mechanics. Progressed resistance, range, repetitions and complexity of movement as indicated. MANUAL THERAPY: (see below for minutes): Joint mobilization and Soft tissue mobilization was utilized and necessary because of the patient's restricted joint motion, painful spasm, loss of articular motion and restricted motion of soft tissue.    MODALITIES: (see Pt to ED per swelling and pain to R upper neck, below jaw. Pt states it states 1 week ago after chewing gum, ever since he has pain when chewing on the R side, Pt c/o low grade fever at home. Denies difficulty breathing/swallowing.  Denies pain at this time below for minutes):      to decrease pain      Date: 4/26/19 5/01/19 (visit 3)      Modalities:  10 min      IFC with ice to L shoulder  In supine with L shoulder supported                      Therapeutic Exercise: 15 min 20 min      Cane Into flexion [id number] Into flexion 3x10      Isometrics 4 way at door [id number] Band walk outs x10 ea yellow      Scapular retraction [id number] Repeat in standing                              Proprioceptive Activities:                                Manual Therapy: 15 min 15 min      Passive physiologic mobilizations Into ER and flexion gr 2 to 3 Into ER and flexion per protocol              Therapeutic Activities:                                  HEP: Patient is independent with performance of above described home program.    [employer] Portal  Treatment/Session Summary:    · Response to Treatment: Pt has reached the protocol limits on PROM. She did not report increased pain but requires verbal cues 50% of the time for correct body mechanics. Continue POC. · Communication/Consultation:  None today  · Equipment provided today:  None today  · Recommendations/Intent for next treatment session: Next visit will focus on progression of mobility and relief of pain.     Total Treatment Billable Duration:  45 min  PT Patient Time In/Time Out  Time In: 1145  Time Out: 1230  Torey Whitt PTA

## 2019-05-03 ENCOUNTER — APPOINTMENT (OUTPATIENT)
Dept: PHYSICAL THERAPY | Age: 69
End: 2019-05-03
Payer: MEDICARE

## 2019-05-06 ENCOUNTER — HOSPITAL ENCOUNTER (OUTPATIENT)
Dept: PHYSICAL THERAPY | Age: 69
Discharge: HOME | End: 2019-05-06
Payer: MEDICARE

## 2019-05-06 PROCEDURE — 97140 MANUAL THERAPY 1/> REGIONS: CPT

## 2019-05-06 PROCEDURE — 97110 THERAPEUTIC EXERCISES: CPT

## 2019-05-06 NOTE — PROGRESS NOTES
Mitch Andres  : 1950  [place]  Secondary: [place] [employer] [id number] @ [address]  [phone]   [phone]      OUTPATIENT PHYSICAL THERAPY: Daily Treatment Note  2019       Date of surgery 3/29 s/p L TSA  Effective Dates: 2019 TO 2019 (90 days). Frequency/Duration: 2 times a week for 90 Days  GOALS: (Goals have been discussed and agreed upon with patient.)  Short-Term Functional Goals: Time Frame: 2 weeks  1. Patient will be independent with HEP. 2. Patient will decrease pain to allow her to sleep through the night. Discharge Goals: Time Frame: 12 weeks  1. Patient will improve active shoulder flexion to 140 ° to restore mobility required for reaching into an overhead cabinet. 2. Patient will have no pain with daily activity. 3. Patient will improve strength into shoulder elevation to 5/5 to restore capacity for reaching and carrying activities. _________________________________________________________________________  Pre-treatment Symptoms/Complaints:  \"it was a bit achy after Wednesday, but it's otherwise been doing better. The cane exercise remains uncomfortable. \"    Pain: Initial: 1-2/10 L shoulder Post Session:  No increase   Medications Last Reviewed:  2019  Updated Objective Findings:  No changes today. TREATMENT:   THERAPEUTIC EXERCISE: (see below for minutes):  Exercises per grid below to improve mobility, strength, balance and coordination. Required minimal verbal and manual cues to promote proper body alignment, promote proper body posture and promote proper body mechanics. Progressed resistance, range, repetitions and complexity of movement as indicated.   MANUAL THERAPY: (see below for minutes): Joint mobilization and Soft tissue mobilization was utilized and necessary because of the patient's restricted joint motion, painful spasm, loss of articular motion and restricted motion of soft tissue. MODALITIES: (see below for minutes):      to decrease pain      Date: 4/26/19 5/01/19 (visit 3) 5/6/19 (visit 4)     Modalities:  10 min      IFC with ice to L shoulder  In supine with L shoulder supported                      Therapeutic Exercise: 15 min 20 min 15 min     Cane Into flexion [id number] Into flexion 3x10      Isometrics 4 way at door [id number] Band walk outs x10 ea yellow      Scapular retraction [id number] Repeat in standing      AROM   Supine flexion to 90 30x, SL ER 30x; SL flexion 30x                     Proprioceptive Activities:                                Manual Therapy: 15 min 15 min 30 min     Passive physiologic mobilizations Into ER and flexion gr 2 to 3 Into ER and flexion per protocol Into ER and flexion gr 3 to 3+              Therapeutic Activities:                                  HEP: Patient is independent with performance of above described home program.    [employer] Portal  Treatment/Session Summary:    · Response to Treatment: progressed to low volume active range of motion activities. She is able to perform these comfortable but she fatigues quickly. · Communication/Consultation:  None today  · Equipment provided today:  None today  · Recommendations/Intent for next treatment session: Next visit will focus on progression of mobility and relief of pain.     Total Treatment Billable Duration:  45 min  PT Patient Time In/Time Out  Time In: 7103  Time Out: [address], JORGET

## 2019-05-08 ENCOUNTER — HOSPITAL ENCOUNTER (OUTPATIENT)
Dept: PHYSICAL THERAPY | Age: 69
Discharge: HOME | End: 2019-05-08
Payer: MEDICARE

## 2019-05-08 PROCEDURE — 97140 MANUAL THERAPY 1/> REGIONS: CPT

## 2019-05-08 PROCEDURE — 97110 THERAPEUTIC EXERCISES: CPT

## 2019-05-08 NOTE — PROGRESS NOTES
Sam Chong  : 1950  [place]  Secondary: [place] [employer] [id number] @ [address] [place] [address].  [phone]   [phone]      OUTPATIENT PHYSICAL THERAPY: Daily Treatment Note  2019       Date of surgery 3/29 s/p L TSA  Effective Dates: 2019 TO 2019 (90 days). Frequency/Duration: 2 times a week for 90 Days  GOALS: (Goals have been discussed and agreed upon with patient.)  Short-Term Functional Goals: Time Frame: 2 weeks  1. Patient will be independent with HEP. 2. Patient will decrease pain to allow her to sleep through the night. Discharge Goals: Time Frame: 12 weeks  1. Patient will improve active shoulder flexion to 140 ° to restore mobility required for reaching into an overhead cabinet. 2. Patient will have no pain with daily activity. 3. Patient will improve strength into shoulder elevation to 5/5 to restore capacity for reaching and carrying activities. _________________________________________________________________________  Pre-treatment Symptoms/Complaints:  Shoulder was more achy yesterday, but she attributes this to increased activity. Pain: Initial: 3/10 L shoulder Post Session:  No increase   Medications Last Reviewed:  2019  Updated Objective Findings:  No changes today. TREATMENT:   THERAPEUTIC EXERCISE: (see below for minutes):  Exercises per grid below to improve mobility, strength, balance and coordination. Required minimal verbal and manual cues to promote proper body alignment, promote proper body posture and promote proper body mechanics. Progressed resistance, range, repetitions and complexity of movement as indicated.   MANUAL THERAPY: (see below for minutes): Joint mobilization and Soft tissue mobilization was utilized and necessary because of the patient's restricted joint motion, painful spasm, loss of articular motion and restricted motion of soft tissue. MODALITIES: (see below for minutes):      to decrease pain      Date: 4/26/19 5/01/19 (visit 3) 5/6/19 (visit 4) 5/8/19 (visit 5)    Modalities:  10 min      IFC with ice to L shoulder  In supine with L shoulder supported                      Therapeutic Exercise: 15 min 20 min 15 min 15 min    Cane Into flexion [id number] Into flexion 3x10      Isometrics 4 way at door [id number] Band walk outs x10 ea yellow      Scapular retraction [id number] Repeat in standing      AROM   Supine flexion to 90 30x, SL ER 30x; SL flexion 30x Repeat; added SL abduction 2x10                    Proprioceptive Activities:                                Manual Therapy: 15 min 15 min 30 min 30 min    Passive physiologic mobilizations Into ER and flexion gr 2 to 3 Into ER and flexion per protocol Into ER and flexion gr 3 to 3+  repeat            Therapeutic Activities:                                  HEP: Patient is independent with performance of above described home program.    [employer] Portal  Treatment/Session Summary:    · Response to Treatment: will continue with initial home program over the weekend, but anticipate progressing home program with active motion beginning of next week. · Communication/Consultation:  None today  · Equipment provided today:  None today  · Recommendations/Intent for next treatment session: Next visit will focus on progression of mobility and relief of pain.     Total Treatment Billable Duration:  45 min  PT Patient Time In/Time Out  Time In: 3763  Time Out: [address]born, JETHRO

## 2019-05-10 ENCOUNTER — APPOINTMENT (OUTPATIENT)
Dept: PHYSICAL THERAPY | Age: 69
End: 2019-05-10
Payer: MEDICARE

## 2019-05-13 ENCOUNTER — HOSPITAL ENCOUNTER (OUTPATIENT)
Dept: PHYSICAL THERAPY | Age: 69
Discharge: HOME | End: 2019-05-13
Payer: MEDICARE

## 2019-05-13 PROCEDURE — 97110 THERAPEUTIC EXERCISES: CPT

## 2019-05-13 PROCEDURE — 97140 MANUAL THERAPY 1/> REGIONS: CPT

## 2019-05-13 NOTE — PROGRESS NOTES
Sam Chong  : 1950  [place]  Secondary: [place] [employer] [id number] @ [address], [place]  [phone]   [phone]      OUTPATIENT PHYSICAL THERAPY: Daily Treatment Note  2019       Date of surgery 3/29 s/p L TSA  Effective Dates: 2019 TO 2019 (90 days). Frequency/Duration: 2 times a week for 90 Days  GOALS: (Goals have been discussed and agreed upon with patient.)  Short-Term Functional Goals: Time Frame: 2 weeks  1. Patient will be independent with HEP. 2. Patient will decrease pain to allow her to sleep through the night. Discharge Goals: Time Frame: 12 weeks  1. Patient will improve active shoulder flexion to 140 ° to restore mobility required for reaching into an overhead cabinet. 2. Patient will have no pain with daily activity. 3. Patient will improve strength into shoulder elevation to 5/5 to restore capacity for reaching and carrying activities. _________________________________________________________________________  Pre-treatment Symptoms/Complaints: Pt states \"It's a little achy sometimes. \"    Pain: Initial: 1-2/10 L shoulder Post Session:  No increase   Medications Last Reviewed:  2019  Updated Objective Findings:  No changes today. TREATMENT:   THERAPEUTIC EXERCISE: (see below for minutes):  Exercises per grid below to improve mobility, strength, balance and coordination. Required minimal verbal and manual cues to promote proper body alignment, promote proper body posture and promote proper body mechanics. Progressed resistance, range, repetitions and complexity of movement as indicated. MANUAL THERAPY: (see below for minutes): Joint mobilization and Soft tissue mobilization was utilized and necessary because of the patient's restricted joint motion, painful spasm, loss of articular motion and restricted motion of soft tissue.    MODALITIES: (see below for minutes):      to decrease pain      Date: 4/26/19 5/01/19 (visit 3) 5/6/19 (visit 4) 5/8/19 (visit 5) 5/13/19 (visit 6)   Modalities:  10 min      IFC with ice to L shoulder  In supine with L shoulder supported                      Therapeutic Exercise: 15 min 20 min 15 min 15 min 15 min   Cane Into flexion [id number] Into flexion 3x10      Isometrics 4 way at door [id number] Band walk outs x10 ea yellow      Scapular retraction [id number] Repeat in standing      AROM   Supine flexion to 90 30x, SL ER 30x; SL flexion 30x Repeat; added SL abduction 2x10 Supine flexion 3x10 and side lying ER and abduction 3x10 each                   Proprioceptive Activities:                                Manual Therapy: 15 min 15 min 30 min 30 min 30 min   Passive physiologic mobilizations Into ER and flexion gr 2 to 3 Into ER and flexion per protocol Into ER and flexion gr 3 to 3+  repeat PROM into ER and then flexion           Therapeutic Activities:                                  HEP: Patient is independent with performance of above described home program.    [employer] Portal  Treatment/Session Summary:    · Response to Treatment: Pt did not report increased pain with AROM or PROM. Her ROM is increasing but strength remains limited. Continue to progress AROM. · Communication/Consultation:  None today  · Equipment provided today:  None today  · Recommendations/Intent for next treatment session: Next visit will focus on progression of mobility and relief of pain.     Total Treatment Billable Duration:  45 min  PT Patient Time In/Time Out  Time In: 1145  Time Out: 1230  Yanet Collins PTA

## 2019-05-15 ENCOUNTER — HOSPITAL ENCOUNTER (OUTPATIENT)
Dept: PHYSICAL THERAPY | Age: 69
Discharge: HOME | End: 2019-05-15
Payer: MEDICARE

## 2019-05-15 PROCEDURE — 97140 MANUAL THERAPY 1/> REGIONS: CPT

## 2019-05-15 PROCEDURE — 97110 THERAPEUTIC EXERCISES: CPT

## 2019-05-15 NOTE — PROGRESS NOTES
Sam Alvarenga  : 1950  [place]  Secondary: [place] [employer] [id number]  [phone]   [phone]      OUTPATIENT PHYSICAL THERAPY: Daily Treatment Note  5/15/2019       Date of surgery 3/29 s/p L TSA  Effective Dates: 2019 TO 2019 (90 days). Frequency/Duration: 2 times a week for 90 Days  GOALS: (Goals have been discussed and agreed upon with patient.)  Short-Term Functional Goals: Time Frame: 2 weeks  1. Patient will be independent with HEP. 2. Patient will decrease pain to allow her to sleep through the night. Discharge Goals: Time Frame: 12 weeks  1. Patient will improve active shoulder flexion to 140 ° to restore mobility required for reaching into an overhead cabinet. 2. Patient will have no pain with daily activity. 3. Patient will improve strength into shoulder elevation to 5/5 to restore capacity for reaching and carrying activities. _________________________________________________________________________  Pre-treatment Symptoms/Complaints: Pt states \"It's a little achy. 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 I'm using my arm more. \"    Pain: Initial: 1-2/10 L shoulder Post Session:  No increase   Medications Last Reviewed:  5/15/2019  Updated Objective Findings:  No changes today. TREATMENT:   THERAPEUTIC EXERCISE: (see below for minutes):  Exercises per grid below to improve mobility, strength, balance and coordination. Required minimal verbal and manual cues to promote proper body alignment, promote proper body posture and promote proper body mechanics. Progressed resistance, range, repetitions and complexity of movement as indicated.   MANUAL THERAPY: (see below for minutes): Joint mobilization and Soft tissue mobilization was utilized and necessary because of the patient's restricted joint motion, painful spasm, loss of articular motion and restricted motion of soft tissue. MODALITIES: (see below for minutes):      to decrease pain      Date: 4/26/19 5/01/19 (visit 3) 5/6/19 (visit 4) 5/8/19 (visit 5) 5/13/19 (visit 6) 5/15/19 (visit 7)   Modalities:  10 min       IFC with ice to L shoulder  In supine with L shoulder supported                         Therapeutic Exercise: 15 min 20 min 15 min 15 min 15 min 30 min   Cane Into flexion [id number] Into flexion 3x10    ER and abduction 3x10 ea   Isometrics 4 way at door [id number] Band walk outs x10 ea yellow       Shoulder extension to neutral      Blue 3x10   Scapular retraction [id number] Repeat in standing    Blue band 3x10   AROM   Supine flexion to 90 30x, SL ER 30x; SL flexion 30x Repeat; added SL abduction 2x10 Supine flexion 3x10 and side lying ER and abduction 3x10 each Supine flexion 3x10 with wand   Finger ladder      x10    UBE      5 min L2   Proprioceptive Activities:                                    Manual Therapy: 15 min 15 min 30 min 30 min 30 min 15 min   Passive physiologic mobilizations Into ER and flexion gr 2 to 3 Into ER and flexion per protocol Into ER and flexion gr 3 to 3+  repeat PROM into ER and then flexion PROM into Er and flexion            Therapeutic Activities:                                      HEP: Patient is independent with performance of above described home program.    [employer] Portal  Treatment/Session Summary:    · Response to Treatment: Pt was educated on the importance of no heavy lifting but she has no assistance with her elderly mother. Pt has almost full flexion with the wand activity and she did not report increased pain. Continue to progress AROM. · Communication/Consultation:  None today  · Equipment provided today:  None today  · Recommendations/Intent for next treatment session: Next visit will focus on progression of mobility and relief of pain.     Total Treatment Billable Duration:  45 min  PT Patient Time In/Time Out  Time In: 1145  Time Out: [facility] [address]

## 2019-05-17 ENCOUNTER — APPOINTMENT (OUTPATIENT)
Dept: PHYSICAL THERAPY | Age: 69
End: 2019-05-17
Payer: MEDICARE

## 2019-05-20 ENCOUNTER — HOSPITAL ENCOUNTER (OUTPATIENT)
Dept: PHYSICAL THERAPY | Age: 69
Discharge: HOME | End: 2019-05-20
Payer: MEDICARE

## 2019-05-20 PROCEDURE — 97140 MANUAL THERAPY 1/> REGIONS: CPT

## 2019-05-20 PROCEDURE — 97110 THERAPEUTIC EXERCISES: CPT

## 2019-05-20 NOTE — PROGRESS NOTES
Brooks Raymond  : 1950  [place]  [place]: [place] [employer] [id number] @ [place] [address], [place].  [phone]   [phone]      OUTPATIENT PHYSICAL THERAPY: Daily Treatment Note  2019       Date of surgery 3/29 s/p L TSA  Effective Dates: 2019 TO 2019 (90 days). Frequency/Duration: 2 times a week for 90 Days  GOALS: (Goals have been discussed and agreed upon with patient.)  Short-Term Functional Goals: Time Frame: 2 weeks  1. Patient will be independent with HEP. 2. Patient will decrease pain to allow her to sleep through the night. Discharge Goals: Time Frame: 12 weeks  1. Patient will improve active shoulder flexion to 140 ° to restore mobility required for reaching into an overhead cabinet. 2. Patient will have no pain with daily activity. 3. Patient will improve strength into shoulder elevation to 5/5 to restore capacity for reaching and carrying activities. _________________________________________________________________________  Pre-treatment Symptoms/Complaints: Pt states \"It's a little achy. 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 I'm using my arm more. \"    Pain: Initial: 1/10 L shoulder  Pt primary complaint of inability to reach behind back. She says is performing ADLs without complaint, without difficulty. Post Session:  No increase   Medications Last Reviewed:  2019  Updated Objective Findings:  No changes today. TREATMENT:   THERAPEUTIC EXERCISE: (see below for minutes):  Exercises per grid below to improve mobility, strength, balance and coordination. Required minimal verbal and manual cues to promote proper body alignment, promote proper body posture and promote proper body mechanics. Progressed resistance, range, repetitions and complexity of movement as indicated.   MANUAL THERAPY: (see below for minutes): Joint mobilization and Soft tissue mobilization was utilized and necessary because of the patient's restricted joint motion, painful spasm, loss of articular motion and restricted motion of soft tissue. MODALITIES: (see below for minutes):      to decrease pain      Date: 4/26/19 5/01/19 (visit 3) 5/6/19 (visit 4) 5/8/19 (visit 5) 5/13/19 (visit 6) 5/15/19 (visit 7) 5/20/19  (visit 8)   Modalities:  10 min        IFC with ice to L shoulder  In supine with L shoulder supported                            Therapeutic Exercise: 15 min 20 min 15 min 15 min 15 min 30 min   30mins   Cane Into flexion [id number] Into flexion 3x10    ER and abduction 3x10 ea Supine ER and abd 3x10   Isometrics 4 way at door [id number] Band walk outs x10 ea yellow        Shoulder extension to neutral      Blue 3x10 Blue 3x10 B   Scapular retraction [id number] Repeat in standing    Blue band 3x10 Blue 3x10 B   AROM   Supine flexion to 90 30x, SL ER 30x; SL flexion 30x Repeat; added SL abduction 2x10 Supine flexion 3x10 and side lying ER and abduction 3x10 each Supine flexion 3x10 with wand Supine  Flexion c wand  3x10   Shoulder flexion \"punch\"       Red 3x10   Finger ladder      x10  x10   Bicep curl       Red x30   UBE      5 min L2 6 min L2  forw/back   Proprioceptive Activities:                                        Manual Therapy: 15 min 15 min 30 min 30 min 30 min 15 min   10mins   Passive physiologic mobilizations Into ER and flexion gr 2 to 3 Into ER and flexion per protocol Into ER and flexion gr 3 to 3+  repeat PROM into ER and then flexion PROM into Er and flexion PROM into Er and flexion             Therapeutic Activities:                                          HEP: Patient is independent with performance of above described home program.    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  Pt has almost full flexion with the wand and PROM, with mild tightness noted at end of available range. Mild tightness noted at end of available range with PROM, ER. TherEx complete without difficulty, without exacerbation of pain. Pt noted some mm fatigue at end of session. Continue to progress AROM. · Communication/Consultation:  None today  · Equipment provided today:  None today  · Recommendations/Intent for next treatment session: Next visit will focus on progression of mobility and relief of pain.     Total Treatment Billable Duration:  40 min  PT Patient Time In/Time Out  Time In: 1145  Time Out: [address], PTA

## 2019-05-22 ENCOUNTER — HOSPITAL ENCOUNTER (OUTPATIENT)
Dept: PHYSICAL THERAPY | Age: 69
Discharge: HOME | End: 2019-05-22
Payer: MEDICARE

## 2019-05-22 PROCEDURE — 97110 THERAPEUTIC EXERCISES: CPT

## 2019-05-22 PROCEDURE — 97140 MANUAL THERAPY 1/> REGIONS: CPT

## 2019-05-22 NOTE — PROGRESS NOTES
Michelle Barba  : 1950  [place]  [place]: [place] [employer] [id number] @ [address], [place]  [phone]   [phone]      OUTPATIENT PHYSICAL THERAPY: Daily Treatment Note  2019       Date of surgery 3/29 s/p L TSA  Effective Dates: 2019 TO 2019 (90 days). Frequency/Duration: 2 times a week for 90 Days  GOALS: (Goals have been discussed and agreed upon with patient.)  Short-Term Functional Goals: Time Frame: 2 weeks  1. Patient will be independent with HEP. 2. Patient will decrease pain to allow her to sleep through the night. Discharge Goals: Time Frame: 12 weeks  1. Patient will improve active shoulder flexion to 140 ° to restore mobility required for reaching into an overhead cabinet. 2. Patient will have no pain with daily activity. 3. Patient will improve strength into shoulder elevation to 5/5 to restore capacity for reaching and carrying activities. _________________________________________________________________________  Pre-treatment Symptoms/Complaints: Pt reports no significant changes. Primary complaint of mild discomfort L shoulder feeling tight and achey. Pt says cannot int of inability to reach behind back. She says is performing ADLs without complaint, without difficulty. Pain: Initial: 1/10 L shoulder         Post Session:  No increase   Medications Last Reviewed:  2019  Updated Objective Findings:  No changes today. TREATMENT:   THERAPEUTIC EXERCISE: (see below for minutes):  Exercises per grid below to improve mobility, strength, balance and coordination. Required minimal verbal and manual cues to promote proper body alignment, promote proper body posture and promote proper body mechanics. Progressed resistance, range, repetitions and complexity of movement as indicated.   MANUAL THERAPY: (see below for minutes): Joint mobilization and Soft tissue mobilization was utilized and necessary because of the patient's restricted joint motion, painful spasm, loss of articular motion and restricted motion of soft tissue.    MODALITIES: (see below for minutes):      to decrease pain      Date: 4/26/19 5/01/19 (visit 3) 5/6/19 (visit 4) 5/8/19 (visit 5) 5/13/19 (visit 6) 5/15/19 (visit 7) 5/20/19  (visit 8) 5/22/19  Visit 9   Modalities:  10 min         IFC with ice to L shoulder  In supine with L shoulder supported                               Therapeutic Exercise: 15 min 20 min 15 min 15 min 15 min 30 min   30mins 35 mins   Cane Into flexion [id number] Into flexion 3x10    ER and abduction 3x10 ea Supine ER and abd 3x10    Isometrics 4 way at door [id number] Band walk outs x10 ea yellow         Shoulder extension to neutral      Blue 3x10 Blue 3x10 B Blue tb 2x10 B   Scapular retraction [id number] Repeat in standing    Blue band 3x10 Blue 3x10 B Blue tb 3x10   AROM   Supine flexion to 90 30x, SL ER 30x; SL flexion 30x Repeat; added SL abduction 2x10 Supine flexion 3x10 and side lying ER and abduction 3x10 each Supine flexion 3x10 with wand Supine  Flexion c wand  3x10    Shoulder flexion \"punch\"       Red 3x10 Red tb 3x10   Finger ladder      x10  x10    Bicep curl       Red x30 Red tb 3x10   ER          Red tb x20   SL abd        1# 2x10   Supine chest press         Wand with 33 3x10   UBE      5 min L2 6 min L2  forw/back 6 min L2 for/back   Proprioceptive Activities:                                            Manual Therapy: 15 min 15 min 30 min 30 min 30 min 15 min   10mins 10 mins   Passive physiologic mobilizations Into ER and flexion gr 2 to 3 Into ER and flexion per protocol Into ER and flexion gr 3 to 3+  repeat PROM into ER and then flexion PROM into Er and flexion PROM into Er and flexion PROM flex, abd, ER                           Therapeutic Activities:                                              HEP: Patient is independent with performance of above described home program.    "[employer] Portal  Treatment/Session Summary:    · Response to Treatment: Pt says her mother has returned to her brother's house. Pt has mild tightness noted at end of available range with PROM. TherEx complete without difficulty, without exacerbation of pain. Pt noted some mm fatigue at end of session. Continue to progress AROM. · Communication/Consultation:  None today  · Equipment provided today:  None today  · Recommendations/Intent for next treatment session: Next visit will focus on progression of mobility and relief of pain.     Total Treatment Billable Duration:  45 min  PT Patient Time In/Time Out  Time In: 1145  Time Out: Theresa [address], PTA

## 2019-05-24 ENCOUNTER — APPOINTMENT (OUTPATIENT)
Dept: PHYSICAL THERAPY | Age: 69
End: 2019-05-24
Payer: MEDICARE

## 2019-05-29 ENCOUNTER — HOSPITAL ENCOUNTER (OUTPATIENT)
Dept: PHYSICAL THERAPY | Age: 69
Discharge: HOME | End: 2019-05-29
Payer: MEDICARE

## 2019-05-29 PROCEDURE — 97110 THERAPEUTIC EXERCISES: CPT

## 2019-05-29 PROCEDURE — 97140 MANUAL THERAPY 1/> REGIONS: CPT

## 2019-05-29 NOTE — PROGRESS NOTES
Carmen File  : 1950  [place]  Secondary: [place] [employer] [id number] @ [place] [address], [address]  [phone]   [phone]      OUTPATIENT PHYSICAL THERAPY: Daily Treatment Note  2019       Date of surgery 3/29 s/p L TSA  Effective Dates: 2019 TO 2019 (90 days). Frequency/Duration: 2 times a week for 90 Days   GOALS: (Goals have been discussed and agreed upon with patient.)  Short-Term Functional Goals: Time Frame: 2 weeks  1. Patient will be independent with HEP. MET  2. Patient will decrease pain to allow her to sleep through the night. MET  Discharge Goals: Time Frame: 12 weeks  1. Patient will improve active shoulder flexion to 140 ° to restore mobility required for reaching into an overhead cabinet. MET   2. Patient will have no pain with daily activity. PROGRESSING  3. Patient will improve strength into shoulder elevation to 5/5 to restore capacity for reaching and carrying activities. NOT MET    _________________________________________________________________________  Pre-treatment Symptoms/Complaints: Continues to note difficulty with reaching behind her back. Doing better with most other daily activities. Continues to have weakness with trying to lift something heavy overhead. Pain: Initial: 1/10 L shoulder         Post Session:  No increase   Medications Last Reviewed:  2019  Updated Objective Findings:    ROM:      All passive:   Shoulder flexion: 110 °   Shoulder ER: 50 ° in scaption  IR: not currently indicated  Elbow wrist and hand mobility are full.      19 update:   Shoulder flexion: 150 ° active  Shoulder ER: 75 ° in scaption   Strength:      Not currently indicated.     19 update:   Shoulder flexion: 4-/5   Shoulder ER: 4/5                  TREATMENT:   THERAPEUTIC EXERCISE: (see below for minutes):  Exercises per grid below to improve mobility, strength, balance and coordination. Required minimal verbal and manual cues to promote proper body alignment, promote proper body posture and promote proper body mechanics. Progressed resistance, range, repetitions and complexity of movement as indicated. MANUAL THERAPY: (see below for minutes): Joint mobilization and Soft tissue mobilization was utilized and necessary because of the patient's restricted joint motion, painful spasm, loss of articular motion and restricted motion of soft tissue.    MODALITIES: (see below for minutes):      to decrease pain      Date: 4/26/19 5/01/19 (visit 3) 5/6/19 (visit 4) 5/8/19 (visit 5) 5/13/19 (visit 6) 5/15/19 (visit 7) 5/20/19  (visit 8) 5/22/19  Visit 9 5/29/19 (visit 10) progress note   Modalities:  10 min          IFC with ice to L shoulder  In supine with L shoulder supported                                  Therapeutic Exercise: 15 min 20 min 15 min 15 min 15 min 30 min   30mins 35 mins 35 min   Cane Into flexion [id number] Into flexion 3x10    ER and abduction 3x10 ea Supine ER and abd 3x10     Isometrics 4 way at door [id number] Band walk outs x10 ea yellow          Shoulder extension to neutral      Blue 3x10 Blue 3x10 B Blue tb 2x10 B Blue tband 2x15   Scapular retraction [id number] Repeat in standing    Blue band 3x10 Blue 3x10 B Blue tb 3x10 Black tband 2x15   AROM   Supine flexion to 90 30x, SL ER 30x; SL flexion 30x Repeat; added SL abduction 2x10 Supine flexion 3x10 and side lying ER and abduction 3x10 each Supine flexion 3x10 with wand Supine  Flexion c wand  3x10     Shoulder flexion \"punch\"       Red 3x10 Red tb 3x10 Standing flexion 2# x10, 1# 2x10   Finger ladder      x10  x10  Wall wash 20x   Bicep curl       Red x30 Red tb 3x10    IR         Green tband 2x15   SL horizontal abduction         1# 3x10               ER          Red tb x20 SL 1# 3x10   SL abd        1# 2x10 1# 3x10   Supine chest press         Wand with 33 3x10    UBE      5 min L2 6 min L2  forw/back 6 min L2 for/back 5 min L4   Proprioceptive Activities:                                                Manual Therapy: 15 min 15 min 30 min 30 min 30 min 15 min   10mins 10 mins 10 min   Passive physiologic mobilizations Into ER and flexion gr 2 to 3 Into ER and flexion per protocol Into ER and flexion gr 3 to 3+  repeat PROM into ER and then flexion PROM into Er and flexion PROM into Er and flexion PROM flex, abd, ER              Repeat; progressed to gr 2 into IR               Therapeutic Activities:                                                  HEP: Patient is independent with performance of above described home program.    [employer] Portal  Treatment/Session Summary:    · Response to Treatment: Strength is continuing to progress. Discussed limitations in mobility with functional IR due to mechanical structure of joint. Updated home progress with active motions above. Plan to continue x 1 visit as she is continuing to improve in her function and has minimal discomfort. · Communication/Consultation:  None today  · Equipment provided today:  None today  · Recommendations/Intent for next treatment session: Next visit will focus on progression of mobility and relief of pain.     Total Treatment Billable Duration:  45 min  PT Patient Time In/Time Out  Time In: 2905  Time Out: 2302 Dionna Osborn, DPT

## 2019-05-31 ENCOUNTER — HOSPITAL ENCOUNTER (OUTPATIENT)
Dept: PHYSICAL THERAPY | Age: 69
Discharge: HOME | End: 2019-05-31
Payer: MEDICARE

## 2019-05-31 PROCEDURE — 97110 THERAPEUTIC EXERCISES: CPT

## 2019-05-31 PROCEDURE — 97140 MANUAL THERAPY 1/> REGIONS: CPT

## 2019-05-31 NOTE — THERAPY DISCHARGE
Allegra Tao  : 1950 [id number] [facility]  [address] [place] [address].  [phone]   [phone]        OUTPATIENT PHYSICAL THERAPY:Initial Assessment 2019         ICD-10: Treatment Diagnosis: Pain in left shoulder (M25.512); Precautions/Allergies:   Sulfa (sulfonamide antibiotics); Trazodone; and Ultram [tramadol]   Fall Risk Score:     Ambulatory/Rehab Services H2 Model Falls Risk Assessment    Risk Factors:       No Risk Factors Identified Ability to Rise from Chair:       (0)  Ability to rise in a single movement    Falls Prevention Plan:       No modifications necessary   Total: (5 or greater = High Risk): 0     [facility] of [employer]. All Rights Reserved. [facility] Patent #[id number]. 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 Maddy Acosta presents to therapy following above surgical repair. She has impairments in mobility of the shoulder complex specifically into external rotation and elevation. Internal rotation is not evaluated at this point due to healing precautions. She has weakness throughout the shoulder complex due to atrophy and continued healing of surgical procedure. This causes difficulty with all activities that involve use of the left arm. Skilled therapy is required to return to prior level of function. PROBLEM LIST (Impacting functional limitations):  1. Decreased Strength  2. Decreased ADL/Functional Activities  3.  Increased Pain  4. Decreased Activity Tolerance  5. Decreased Flexibility/Joint Mobility INTERVENTIONS PLANNED:  1. Family Education  2. Home Exercise Program (HEP)  3. Manual Therapy  4. Neuromuscular Re-education/Strengthening  5. Range of Motion (ROM)  6. Therapeutic Activites  7. Therapeutic Exercise/Strengthening  8. modalities    TREATMENT PLAN:  Effective Dates: 4/26/2019 TO 7/25/2019 (90 days). Frequency/Duration: 2 times a week for 90 Days  GOALS: (Goals have been discussed and agreed upon with patient.)  Short-Term Functional Goals: Time Frame: 2 weeks ALL MET  1. Patient will be independent with HEP. 2. Patient will decrease pain to allow her to sleep through the night. Discharge Goals: Time Frame: 12 weeks ALL MET  1. Patient will improve active shoulder flexion to 140 ° to restore mobility required for reaching into an overhead cabinet. 2. Patient will have no pain with daily activity. 3. Patient will improve strength into shoulder elevation to 5/5 to restore capacity for reaching and carrying activities. Rehabilitation Potential For Stated Goals: Excellent                HISTORY:   History of Present Injury/Illness (Reason for Referral):  Patient presents to therapy following above surgical repair. Prior to surgery she notes a 2+ year history of shoulder pain that was significantly exacerbated with caring for her mother with tasks that involved extensive lifting and assistance with transfers. She notes no complications with surgery and previously had approximately 6 sessions of home health therapy. She continues to have discomfort through the shoulder that she describes as aching and soreness. This intermittently causes difficulty with sleeping at night. She has minimal pain during the day when she is active.    Past Medical History/Comorbidities:   Ms. Dhiraj Wang  has a past medical history of Arthritis, BMI 38.0-38.9,adult, Breast cancer ([facility] [address] (right), Chronic pain, GERD (gastroesophageal reflux disease), Insomnia, Leg cramps, Liver disease, and Scarlet fever. She also has no past medical history of Chronic kidney disease. Ms. Beni Lang  has a past surgical history that includes hx heent; hx tonsillectomy (1961); hx lap cholecystectomy; hx carpal tunnel release (Left); hx carpal tunnel release (Right); hx orthopaedic (Left); hx orthopaedic (Left); hx hysterectomy; hx knee arthroscopy (Right); hx bunionectomy (Right); hx rotator cuff repair (Right); hx breast biopsy (2018); and hx knee replacement (Right, 11/2017). Social History/Living Environment:     Patient lives by herself. Prior Level of Function/Work/Activity:  Patient is retired. Dominant Side:         RIGHT  Current Medications:    Current Outpatient Medications:     diclofenac EC (VOLTAREN) 75 mg EC tablet, Take 75 mg by mouth two (2) times a day., Disp: , Rfl:     amitriptyline (ELAVIL) 10 mg tablet, Take 10 mg by mouth nightly., Disp: , Rfl:     melatonin 1 mg tablet, Take 1 mg by mouth nightly., Disp: , Rfl:     acetaminophen (TYLENOL) 500 mg tablet, Take 2 Tabs by mouth every six (6) hours. , Disp: 120 Tab, Rfl: 0    aspirin delayed-release 325 mg tablet, Take 1 Tab by mouth every twelve (12) hours every twelve (12) hours. (Patient taking differently: Take 325 mg by mouth nightly.), Disp: 60 Tab, Rfl: 0    cyclobenzaprine (FLEXERIL) 10 mg tablet, Take 0.5 Tabs by mouth three (3) times daily. , Disp: 60 Tab, Rfl: 0    gabapentin (NEURONTIN) 100 mg capsule, Take 1 Cap by mouth two (2) times a day., Disp: 60 Cap, Rfl: 0    HYDROmorphone (DILAUDID) 2 mg tablet, Take 1 Tab by mouth every four (4) hours as needed. Max Daily Amount: 12 mg., Disp: 90 Tab, Rfl: 0    ondansetron (ZOFRAN ODT) 8 mg disintegrating tablet, Take 1 Tab by mouth every eight (8) hours as needed for Nausea., Disp: 60 Tab, Rfl: 0    senna-docusate (PERICOLACE) 8.6-50 mg per tablet, Take 2 Tabs by mouth daily. , Disp: 120 Tab, Rfl: 0    zolpidem (AMBIEN) 5 mg tablet, Take 1 Tab by mouth nightly as needed for Sleep. Max Daily Amount: 5 mg., Disp: 60 Tab, Rfl: 0    omeprazole (PRILOSEC) 20 mg capsule, Take 20 mg by mouth Daily (before breakfast). Take / use AM day of surgery  per anesthesia protocols. Indications: GASTROESOPHAGEAL REFLUX, Disp: , Rfl:     multivitamin (ONE A DAY) tablet, Take 1 Tab by mouth daily. , Disp: , Rfl:     VITAMIN E, DL,TOCOPHERYL ACET, (VITAMIN E ACETATE) 400 unit cap capsule, Take 400 Units by mouth every other day., Disp: , Rfl:     magnesium oxide (MAG-OX) 400 mg tablet, Take 400 mg by mouth two (2) times a day. Indications: muscle cramps, Disp: , Rfl:     b complex vitamins (B COMPLEX 1) tablet, Take 1 Tab by mouth every other day., Disp: , Rfl:     ascorbic acid (VITAMIN C) 500 mg tablet, Take 500 mg by mouth every other day., Disp: , Rfl:    Date Last Reviewed:  5/31/2019   # of Personal Factors/Comorbidities that affect the Plan of Care: 0: LOW COMPLEXITY   EXAMINATION:   Observation/Orthostatic Postural Assessment:          Incision site is closed and mobile. Scapular position is symmetric. Palpation:          Mild tenderness along incision site. ROM:     All passive:   Shoulder flexion: 110 °   Shoulder ER: 50 ° in scaption  IR: not currently indicated  Elbow wrist and hand mobility are full. 5/31/19 DISCHARGE UPDATE:   Flexion: 150 ° active; 160 ° passive  ER: 80 ° in scaption  Hand behind head: finger to T4  IR: 60 ° to initial onset of resistance   Strength:     Not currently indicated. 5/31/19 DISCHARGE UPDATE:   Scaption: 4/5  ER: 4+/5   IR: 5/5    Special Tests:          Not applicable  Neurological Screen:        Grossly intact. Functional Mobility:         independent  Balance: WNL   Body Structures Involved:  1. Nerves  2. Bones  3. Joints  4. Muscles  5. Ligaments Body Functions Affected:  1. Sensory/Pain  2. Neuromusculoskeletal  3. Movement Related Activities and Participation Affected:  1.  General Tasks and Demands  2. Mobility  3. Self Care  4. Domestic Life  5. Interpersonal Interactions and Relationships  6. Community, Social and [place]   # of elements that affect the Plan of Care: 1-2: LOW COMPLEXITY   CLINICAL PRESENTATION:   Presentation: Stable and uncomplicated: LOW COMPLEXITY   CLINICAL DECISION MAKING:   Tool Used: Disabilities of the Arm, Shoulder and Hand (DASH) Questionnaire - Quick Version  Score:  Initial: 23/55  Most Recent: 20/55 (Date: 5/31/19 )   Interpretation of Score: The DASH is designed to measure the activities of daily living in person's with upper extremity dysfunction or pain. Each section is scored on a 1-5 scale, 5 representing the greatest disability. The scores of each section are added together for a total score of 55. Medical Necessity:   · Patient has met all goals and returned to prior level of function.  Will discharge to home program.    Use of outcome tool(s) and clinical judgement create a POC that gives a: Clear prediction of patient's progress: LOW COMPLEXITY     Total Treatment Duration:  PT Patient Time In/Time Out  Time In: 1145  Time Out: 45 JORGE LugoT

## 2019-05-31 NOTE — PROGRESS NOTES
Trisha Guerra  : 1950  [place]  Secondary: [place] [employer] [id number] @ [address] [place] [address].  [phone]   [phone]      OUTPATIENT PHYSICAL THERAPY: Daily Treatment Note  2019       Date of surgery 3/29 s/p L TSA  Effective Dates: 2019 TO 2019 (90 days). Frequency/Duration: 2 times a week for 90 Days   GOALS: (Goals have been discussed and agreed upon with patient.)  Short-Term Functional Goals: Time Frame: 2 weeks  1. Patient will be independent with HEP. MET  2. Patient will decrease pain to allow her to sleep through the night. MET  Discharge Goals: Time Frame: 12 weeks  1. Patient will improve active shoulder flexion to 140 ° to restore mobility required for reaching into an overhead cabinet. MET   2. Patient will have no pain with daily activity. PROGRESSING  3. Patient will improve strength into shoulder elevation to 5/5 to restore capacity for reaching and carrying activities. NOT MET    _________________________________________________________________________  Pre-treatment Symptoms/Complaints: \"It feels good it feels like the shoulder is about back to normal. It's just not as strong as it was. \"    Pain: Initial: 10 L shoulder         Post Session:  No increase   Medications Last Reviewed:  2019  Updated Objective Findings:    ROM:      All passive:   Shoulder flexion: 110 °   Shoulder ER: 50 ° in scaption  IR: not currently indicated  Elbow wrist and hand mobility are full.      19 update:   Shoulder flexion: 150 ° active  Shoulder ER: 75 ° in scaption   Strength:      Not currently indicated. 19 update:   Shoulder flexion: 4-/5   Shoulder ER: 4/5                  TREATMENT:   THERAPEUTIC EXERCISE: (see below for minutes):  Exercises per grid below to improve mobility, strength, balance and coordination.   Required minimal verbal and manual cues to promote proper body alignment, promote proper body posture and promote proper body mechanics. Progressed resistance, range, repetitions and complexity of movement as indicated. MANUAL THERAPY: (see below for minutes): Joint mobilization and Soft tissue mobilization was utilized and necessary because of the patient's restricted joint motion, painful spasm, loss of articular motion and restricted motion of soft tissue.    MODALITIES: (see below for minutes):      to decrease pain      Date: 4/26/19 5/01/19 (visit 3) 5/6/19 (visit 4) 5/8/19 (visit 5) 5/13/19 (visit 6) 5/15/19 (visit 7) 5/20/19  (visit 8) 5/22/19  Visit 9 5/29/19 (visit 10) progress note 5/31/19 (visit 11) discharged   Modalities:  10 min           IFC with ice to L shoulder  In supine with L shoulder supported                                     Therapeutic Exercise: 15 min 20 min 15 min 15 min 15 min 30 min   30mins 35 mins 35 min 35 min   Cane Into flexion [id number] Into flexion 3x10    ER and abduction 3x10 ea Supine ER and abd 3x10      Isometrics 4 way at door [id number] Band walk outs x10 ea yellow           Shoulder extension to neutral      Blue 3x10 Blue 3x10 B Blue tb 2x10 B Blue tband 2x15 Repeat and reviewed   Scapular retraction [id number] Repeat in standing    Blue band 3x10 Blue 3x10 B Blue tb 3x10 Black tband 2x15 Repeat and reviewed   AROM   Supine flexion to 90 30x, SL ER 30x; SL flexion 30x Repeat; added SL abduction 2x10 Supine flexion 3x10 and side lying ER and abduction 3x10 each Supine flexion 3x10 with wand Supine  Flexion c wand  3x10      Shoulder flexion \"punch\"       Red 3x10 Red tb 3x10 Standing flexion 2# x10, 1# 2x10 reviewed and repeat   Finger ladder      x10  x10  Wall wash 20x 30x   Bicep curl       Red x30 Red tb 3x10     IR         Green tband 2x15 Green tband 3x15    SL horizontal abduction         1# 3x10 1# 3x10                ER          Red tb x20 SL 1# 3x10 Repeat and reviewed   SL abd        1# 2x10 1# 3x10 Repeat and revieweed   Supine chest press         Wand with 33 3x10     UBE      5 min L2 6 min L2  forw/back 6 min L2 for/back 5 min L4 5 min L4   Proprioceptive Activities:                                                    Manual Therapy: 15 min 15 min 30 min 30 min 30 min 15 min   10mins 10 mins 10 min 10 min   Passive physiologic mobilizations Into ER and flexion gr 2 to 3 Into ER and flexion per protocol Into ER and flexion gr 3 to 3+  repeat PROM into ER and then flexion PROM into Er and flexion PROM into Er and flexion PROM flex, abd, ER              Repeat; progressed to gr 2 into IR repeat                Therapeutic Activities:                                                      HEP: Patient is independent with performance of above described home program.    "[employer] Portal  Treatment/Session Summary:    · Response to Treatment: Patient has met all goals and returned to prior level of function. Will discharge to home program.   · Communication/Consultation:  None today  · Equipment provided today:  None today  · Recommendations/Intent for next treatment session: Next visit will focus on progression of mobility and relief of pain.     Total Treatment Billable Duration:  45 min  PT Patient Time In/Time Out  Time In: 6612  Time Out: 1473 Dionna Osborn, DPT

## 2022-09-21 NOTE — PERIOP NOTES
Lisa Donohue MD    Your patient recently had labs drawn during a hospital appointment due to an upcoming surgery. The results are attached. If you have any questions or concerns please reach out to your patient for a follow-up in your office. Please do not respond to this message as my mailbox is not monitored. You may call [phone] with questions or concerns. Recent Results (from the past 12 hour(s))   CBC WITH AUTOMATED DIFF    Collection Time: 11/06/17  9:08 AM   Result Value Ref Range    WBC 4.0 (L) 4.3 - 11.1 K/uL    RBC 5.12 4.05 - 5.25 M/uL    HGB 14.5 11.7 - 15.4 g/dL    HCT 43.6 35.8 - 46.3 %    MCV 85.2 79.6 - 97.8 FL    MCH 28.3 26.1 - 32.9 PG    MCHC 33.3 31.4 - 35.0 g/dL    RDW 14.4 11.9 - 14.6 %    PLATELET 544 075 - 604 K/uL    MPV 9.6 (L) 10.8 - 14.1 FL    DF AUTOMATED      NEUTROPHILS 58 43 - 78 %    LYMPHOCYTES 31 13 - 44 %    MONOCYTES 7 4.0 - 12.0 %    EOSINOPHILS 3 0.5 - 7.8 %    BASOPHILS 1 0.0 - 2.0 %    IMMATURE GRANULOCYTES 0 0.0 - 5.0 %    ABS. NEUTROPHILS 2.3 1.7 - 8.2 K/UL    ABS. LYMPHOCYTES 1.2 0.5 - 4.6 K/UL    ABS. MONOCYTES 0.3 0.1 - 1.3 K/UL    ABS. EOSINOPHILS 0.1 0.0 - 0.8 K/UL    ABS. BASOPHILS 0.0 0.0 - 0.2 K/UL    ABS. IMM.  GRANS. 0.0 0.0 - 0.5 K/UL   PROTHROMBIN TIME + INR    Collection Time: 11/06/17  9:08 AM   Result Value Ref Range    Prothrombin time 10.4 9.6 - 12.0 sec    INR 1.0 0.9 - 1.2     PTT    Collection Time: 11/06/17  9:08 AM   Result Value Ref Range    aPTT 27.1 23.5 - 05.4 SEC   METABOLIC PANEL, BASIC    Collection Time: 11/06/17  9:08 AM   Result Value Ref Range    Sodium 145 136 - 145 mmol/L    Potassium 4.0 3.5 - 5.1 mmol/L    Chloride 107 98 - 107 mmol/L    CO2 29 21 - 32 mmol/L    Anion gap 9 7 - 16 mmol/L    Glucose 94 65 - 100 mg/dL    BUN 14 8 - 23 MG/DL    Creatinine 0.69 0.6 - 1.0 MG/DL    GFR est AA >60 >60 ml/min/1.73m2    GFR est non-AA >60 >60 ml/min/1.73m2    Calcium 9.1 8.3 - 10.4 MG/DL   URINALYSIS W/ RFLX MICROSCOPIC    Collection · Family unable to provide appropriate assistance in independent living facility  · Accepted at [address] Time: 11/06/17  9:08 AM   Result Value Ref Range    Color YELLOW      Appearance CLEAR      Specific gravity 1.011 1.001 - 1.023      pH (UA) 7.0 5.0 - 9.0      Protein NEGATIVE  NEG mg/dL    Glucose NEGATIVE  mg/dL    Ketone NEGATIVE  NEG mg/dL    Bilirubin NEGATIVE  NEG      Blood NEGATIVE  NEG      Urobilinogen 0.2 0.2 - 1.0 EU/dL    Nitrites NEGATIVE  NEG      Leukocyte Esterase NEGATIVE  NEG     ALBUMIN    Collection Time: 11/06/17  9:08 AM   Result Value Ref Range    Albumin 3.8 3.2 - 4.6 g/dL   HEMOGLOBIN A1C WITH EAG    Collection Time: 11/06/17  9:08 AM   Result Value Ref Range    Hemoglobin A1c 5.7 4.8 - 6.0 %    Est. average glucose 117 mg/dL   MSSA/MRSA SC BY PCR, NASAL SWAB    Collection Time: 11/06/17 10:50 AM   Result Value Ref Range    Special Requests: NASAL      Culture result:        SA target not detected. A MRSA NEGATIVE, SA NEGATIVE test result does not preclude MRSA or SA nasal colonization.    EKG, 12 LEAD, INITIAL    Collection Time: 11/06/17 10:55 AM   Result Value Ref Range    Ventricular Rate 63 BPM    Atrial Rate 63 BPM    P-R Interval 174 ms    QRS Duration 84 ms    Q-T Interval 418 ms    QTC Calculation (Bezet) 427 ms    Calculated P Axis 53 degrees    Calculated R Axis 8 degrees    Calculated T Axis 44 degrees    Diagnosis       Normal sinus rhythm  Normal ECG  When compared with ECG of 17-MAY-2005 15:12,  No significant change was found  Confirmed by CASSIE REEVES (), Angle Cabral ([id number]) on 11/6/2017 12:34:33 PM

## (undated) DEVICE — ARGYLE SIGMOID SURGICAL SUCTION INSTRUMENT 23 FR/CH (7.7 MM): Brand: ARGYLE

## (undated) DEVICE — SUTURE VCRL SZ 0 L27IN ABSRB UD L36MM CP-1 1/2 CIR REV CUT J267H

## (undated) DEVICE — SURGICAL PROCEDURE PACK BASIC ST FRANCIS

## (undated) DEVICE — SOLUTION IRRIG 3000ML 0.9% SOD CHL FLX CONT 0797208] ICU MEDICAL INC]

## (undated) DEVICE — DISPOSABLE DRAPE, STERILE, FOR A CDS-3060 5 FOOT TABLE: Brand: PEDIGO PRODUCTS, INC.

## (undated) DEVICE — SPONGE LAP 18X18IN STRL -- 5/PK

## (undated) DEVICE — Device

## (undated) DEVICE — SUTURE 5 MERS GRN 30 TO 40 IN D9211

## (undated) DEVICE — GOWN,REINFORCED,POLY,AURORA,XXLARGE,STR: Brand: MEDLINE

## (undated) DEVICE — DRAPE,TOP,102X53,STERILE: Brand: MEDLINE

## (undated) DEVICE — REM POLYHESIVE ADULT PATIENT RETURN ELECTRODE: Brand: VALLEYLAB

## (undated) DEVICE — SUTURE PROL SZ 2-0 L18IN NONABSORBABLE BLU FS L26MM 3/8 CIR 8685H

## (undated) DEVICE — (D)STRIP SKN CLSR 0.5X4IN WHT --

## (undated) DEVICE — SUTURE STRATAFIX SPRL SZ 1 L5IN ABSRB VLT CT-1 L36MM 1/2 SXPD2B401

## (undated) DEVICE — FAN SPRAY KIT: Brand: PULSAVAC®

## (undated) DEVICE — SUTURE FIBERWIRE SZ 2 W/ TAPERED NEEDLE BLUE L38IN NONABSORB BLU L26.5MM 1/2 CIRCLE AR7200

## (undated) DEVICE — Device: Brand: STABLECUT®

## (undated) DEVICE — DRAPE TWL SURG 16X26IN BLU ORB04] ALLCARE INC]

## (undated) DEVICE — GUIDEPIN ORTH L190MM DIA2.5MM METAGLENE CTRL FOR DELT XTEND

## (undated) DEVICE — 2000CC GUARDIAN II: Brand: GUARDIAN

## (undated) DEVICE — PACKING WND W1INXL6FT VAG WVN COT GZ RADPQ

## (undated) DEVICE — SYR LR LCK 1ML GRAD NSAF 30ML --

## (undated) DEVICE — HANDPIECE SET WITH COAXIAL HIGH FLOW TIP AND SUCTION TUBE: Brand: INTERPULSE

## (undated) DEVICE — GUIDEPIN ORTH L300MM DIA1.5MM GLENOSPHERE FOR DELT XTEND

## (undated) DEVICE — T4 HOOD

## (undated) DEVICE — (D)PREP SKN CHLRAPRP APPL 26ML -- CONVERT TO ITEM 371833

## (undated) DEVICE — BUTTON SWITCH PENCIL BLADE ELECTRODE, HOLSTER: Brand: EDGE

## (undated) DEVICE — TRAY PREP DRY W/ PREM GLV 2 APPL 6 SPNG 2 UNDPD 1 OVERWRAP

## (undated) DEVICE — CONTAINER,SPECIMEN,O.R.STRL,4.5OZ: Brand: MEDLINE

## (undated) DEVICE — COVER,MAYO STAND,STERILE: Brand: MEDLINE

## (undated) DEVICE — OSCILLATING TIP SAW CARTRIDGE: Brand: STRYKER PRECISION

## (undated) DEVICE — TRAY CATH 16F DRN BG LTX -- CONVERT TO ITEM 363158

## (undated) DEVICE — KENDALL SCD EXPRESS SLEEVES, KNEE LENGTH, MEDIUM: Brand: KENDALL SCD

## (undated) DEVICE — NEEDLE HYPO 21GA L1.5IN INTRAMUSCULAR S STL LATCH BVL UP

## (undated) DEVICE — PACK PROCEDURE SURG TOT KNEE

## (undated) DEVICE — SUTURE ETHBND EXCEL SZ 2 L27IN NONABSORBABLE GRN WHT MO-7 D7485

## (undated) DEVICE — DRSG AQUACEL SURG 3.5 X 10IN -- CONVERT TO ITEM 370183

## (undated) DEVICE — SUTURE MCRYL SZ 2-0 L27IN ABSRB UD CP-1 1 L36MM 1/2 CIR REV Y266H

## (undated) DEVICE — SINGLE BASIN: Brand: CARDINAL HEALTH

## (undated) DEVICE — BIPOLAR SEALER 23-313-1 AQM 9.5XL: Brand: AQUAMANTYS ®

## (undated) DEVICE — MEDI-VAC YANKAUER SUCTION HANDLE W/BULBOUS TIP: Brand: CARDINAL HEALTH

## (undated) DEVICE — SCREW BNE L18MM DIA4.5MM SHLDR TI NONLOCKING FULL THRD FOR
Type: IMPLANTABLE DEVICE | Site: SHOULDER | Status: NON-FUNCTIONAL
Removed: 2019-03-28

## (undated) DEVICE — SOLUTION IRRIG 1000ML H2O STRL BLT

## (undated) DEVICE — 3000CC GUARDIAN II: Brand: GUARDIAN

## (undated) DEVICE — SKIN STAPLER: Brand: SIGNET

## (undated) DEVICE — NEEDLE SPNL 22GA L3.5IN BLK HUB S STL REG WALL FIT STYL W/

## (undated) DEVICE — BANDAGE COMPR SELF ADH 5 YDX4 IN TAN STRL PREMIERPRO LF

## (undated) DEVICE — SHEET, DRAPE, SPLIT, STERILE: Brand: MEDLINE

## (undated) DEVICE — ELECTRODE NDL 2.8IN COAT VALLEYLAB

## (undated) DEVICE — STERILE PRESSURE PROTECTOR PAD® FOR DE MAYO UNIVERSAL DISTRACTOR® (10/CASE): Brand: DE MAYO UNIVERSAL DISTRACTOR®

## (undated) DEVICE — ABDOMINAL PAD: Brand: DERMACEA

## (undated) DEVICE — SYRINGE CATH TIP 50ML

## (undated) DEVICE — Z DISCONTINUED USE 2744636  DRESSING AQUACEL 14 IN ALG W3.5XL14IN POLYUR FLM CVR W/ HYDRCOLL

## (undated) DEVICE — SOLUTION IV 1000ML 0.9% SOD CHL

## (undated) DEVICE — Z DISCONTINUED USE 2423037 APPLICATOR MEDICATED 3 CC CHLORHEXIDINE GLUC 2% CHLORAPREP

## (undated) DEVICE — STOCKINETTE TUBE 6X48 -- MEDICHOICE

## (undated) DEVICE — (D)SYR 10ML 1/5ML GRAD NSAF -- PKGING CHANGE USE ITEM 338027